# Patient Record
Sex: FEMALE | Race: WHITE | NOT HISPANIC OR LATINO | Employment: OTHER | URBAN - METROPOLITAN AREA
[De-identification: names, ages, dates, MRNs, and addresses within clinical notes are randomized per-mention and may not be internally consistent; named-entity substitution may affect disease eponyms.]

---

## 2017-01-12 ENCOUNTER — OFFICE VISIT (OUTPATIENT)
Dept: URGENT CARE | Facility: URGENT CARE | Age: 57
End: 2017-01-12
Payer: COMMERCIAL

## 2017-01-12 VITALS
BODY MASS INDEX: 33.9 KG/M2 | DIASTOLIC BLOOD PRESSURE: 76 MMHG | SYSTOLIC BLOOD PRESSURE: 118 MMHG | HEART RATE: 101 BPM | WEIGHT: 216 LBS | OXYGEN SATURATION: 96 % | TEMPERATURE: 98.5 F | HEIGHT: 67 IN

## 2017-01-12 DIAGNOSIS — N30.01 ACUTE CYSTITIS WITH HEMATURIA: Primary | ICD-10-CM

## 2017-01-12 DIAGNOSIS — R30.0 DYSURIA: ICD-10-CM

## 2017-01-12 LAB
ALBUMIN UR-MCNC: NEGATIVE MG/DL
APPEARANCE UR: ABNORMAL
BACTERIA #/AREA URNS HPF: ABNORMAL /HPF
BILIRUB UR QL STRIP: NEGATIVE
COLOR UR AUTO: YELLOW
GLUCOSE UR STRIP-MCNC: NEGATIVE MG/DL
HGB UR QL STRIP: ABNORMAL
KETONES UR STRIP-MCNC: NEGATIVE MG/DL
LEUKOCYTE ESTERASE UR QL STRIP: ABNORMAL
NITRATE UR QL: NEGATIVE
NON-SQ EPI CELLS #/AREA URNS LPF: ABNORMAL /LPF
PH UR STRIP: 7 PH (ref 5–7)
RBC #/AREA URNS AUTO: ABNORMAL /HPF (ref 0–2)
SP GR UR STRIP: 1.02 (ref 1–1.03)
URN SPEC COLLECT METH UR: ABNORMAL
UROBILINOGEN UR STRIP-ACNC: 0.2 EU/DL (ref 0.2–1)
WBC #/AREA URNS AUTO: ABNORMAL /HPF (ref 0–2)

## 2017-01-12 PROCEDURE — 81001 URINALYSIS AUTO W/SCOPE: CPT | Performed by: NURSE PRACTITIONER

## 2017-01-12 PROCEDURE — 99213 OFFICE O/P EST LOW 20 MIN: CPT | Performed by: NURSE PRACTITIONER

## 2017-01-12 PROCEDURE — 87088 URINE BACTERIA CULTURE: CPT | Performed by: NURSE PRACTITIONER

## 2017-01-12 PROCEDURE — 87186 SC STD MICRODIL/AGAR DIL: CPT | Performed by: NURSE PRACTITIONER

## 2017-01-12 PROCEDURE — 87086 URINE CULTURE/COLONY COUNT: CPT | Performed by: NURSE PRACTITIONER

## 2017-01-12 RX ORDER — NITROFURANTOIN 25; 75 MG/1; MG/1
100 CAPSULE ORAL 2 TIMES DAILY
Qty: 14 CAPSULE | Refills: 0 | Status: SHIPPED | OUTPATIENT
Start: 2017-01-12 | End: 2017-03-11

## 2017-01-12 RX ORDER — CETIRIZINE HYDROCHLORIDE, PSEUDOEPHEDRINE HYDROCHLORIDE 5; 120 MG/1; MG/1
1 TABLET, FILM COATED, EXTENDED RELEASE ORAL 2 TIMES DAILY
Qty: 180 TABLET | Refills: 3 | Status: CANCELLED | OUTPATIENT
Start: 2017-01-12

## 2017-01-12 NOTE — MR AVS SNAPSHOT
After Visit Summary   1/12/2017    Shaye Brian    MRN: [id number]           Patient Information     Date Of Birth          1960        Visit Information        Provider Department      1/12/2017 8:45 PM Brandi Martinez APRN [facility] URGENT CARE        Today's Diagnoses     Dysuria    -  1     Seasonal allergic rhinitis         Nonspecific finding on examination of urine         Acute cystitis with hematuria           Care Instructions      Urinary Tract Infections in Women  Urinary tract infections (UTIs) are most often caused by bacteria (germs). These bacteria enter the urinary tract. The bacteria may come from outside the body. Or they may travel from the skin outside the rectum or vagina into the urethra. Female anatomy makes it easier for bacteria from the bowel to enter a woman s urinary tract, which is the most common source of UTI. This means women develop UTIs more often than men. Pain in or around the urinary tract is a common UTI symptom. But the only way to know for sure if you have a UTI for the health care provider to test your urine. The two tests that may be done are the urinalysis and urine culture.  Types of UTIs    Cystitis: A bladder infection (cystitis) is the most common UTI in women. You may have urgent or frequent urination. You may also have pain, burning when you urinate, and bloody urine.    Urethritis: This is an inflamed urethra, which is the tube that carries urine from the bladder to outside the body. You may have lower stomach or back pain. You may also have urgent or frequent urination.    Pyelonephritis: This is a kidney infection. If not treated, it can be serious and damage your kidneys. In severe cases, you may be hospitalized. You may have a fever and lower back pain.  Medications to treat a UTI  Most UTIs are treated with antibiotics. These kill the bacteria. The length of time you need to take them depends on the type of infection.  It may be as short as 3 days. If you have repeated UTIs, a low-dose antibiotic may be needed for several months. Take antibiotics exactly as directed. Don t stop taking them until all of the medication is gone. If you stop taking the antibiotic too soon, the infection may not go away, and you may develop a resistance to the antibiotic. This can make it much harder to treat.  Lifestyle changes to treat and prevent UTIs  The lifestyle changes below will help get rid of your UTI. They may also help prevent future UTIs.    Drink plenty of fluids. This includes water, juice, or other caffeine-free drinks. Fluids help flush bacteria out of your body.    Empty your bladder. Always empty your bladder when you feel the urge to urinate. And always urinate before going to sleep. Urine that stays in your bladder can lead to infection. Try to urinate before and after sex as well.    Practice good personal hygiene. Wipe yourself from front to back after using the toilet. This helps keep bacteria from getting into the urethra.    Use condoms during sex. These help prevent UTIs caused by sexually transmitted bacteria. Also, avoid using spermicides during sex. These can increase the risk of UTIs. Choose other forms of birth control instead. For women who tend to get UTIs after sex, a low-dose of a preventive antibiotic may be used. Be sure to discuss this option with your health care provider.    Follow up with your health care provider as directed. He or she may test to make sure the infection has cleared. If necessary, additional treatment may be started.    7458-0079 [employer]. [address] [place] [address], [place] [address]. All rights reserved. This information is not intended as a substitute for professional medical care. Always follow your healthcare professional's instructions.              Follow-ups after your visit        Who to contact     If you have questions or need follow up information about today's clinic  "visit or your schedule please contact [facility] URGENT CARE directly at [phone].  Normal or non-critical lab and imaging results will be communicated to you by MyChart, letter or phone within 4 business days after the clinic has received the results. If you do not hear from us within 7 days, please contact the clinic through Busuuhart or phone. If you have a critical or abnormal lab result, we will notify you by phone as soon as possible.  Submit refill requests through coJuvo or call your pharmacy and they will forward the refill request to us. Please allow 3 business days for your refill to be completed.          Additional Information About Your Visit        BusuuharConnectbright Information     [employer]Juvo gives you secure access to your electronic health record. If you see a primary care provider, you can also send messages to your care team and make appointments. If you have questions, please call your primary care clinic.  If you do not have a primary care provider, please call [phone] and they will assist you.        Care EveryWhere ID     This is your Care EveryWhere ID. This could be used by other organizations to access your [place] medical records  [id number]        Your Vitals Were     Pulse Temperature Height BMI (Body Mass Index) Pulse Oximetry Last Period    101 98.5  F (36.9  C) (Oral) 5' 7\" (1.702 m) 33.82 kg/m2 96% 12/05/2013       Blood Pressure from Last 3 Encounters:   01/12/17 118/76   11/30/16 110/72   06/03/16 110/70    Weight from Last 3 Encounters:   01/12/17 216 lb (97.977 kg)   11/30/16 212 lb 9.6 oz (96.435 kg)   06/03/16 202 lb 9.6 oz (91.899 kg)              We Performed the Following     *UA reflex to Microscopic and Culture ([facility] (except [place] and [place])     Urine Culture Aerobic Bacterial     Urine Microscopic          Today's Medication Changes          These changes are accurate as of: 1/12/17  9:10 PM.  If you have any questions, ask " your nurse or doctor.               Start taking these medicines.        Dose/Directions    nitrofurantoin (macrocrystal-monohydrate) 100 MG capsule   Commonly known as:  MACROBID   Used for:  Acute cystitis with hematuria   Started by:  Brandi Martinez APRN CNP        Dose:  100 mg   Take 1 capsule (100 mg) by mouth 2 times daily   Quantity:  14 capsule   Refills:  0            Where to get your medicines      These medications were sent to [id number] - [facility] [id number] [place] [address]  [address] [facility] [address]    Hours:  24-hours Phone:  [phone]    - nitrofurantoin (macrocrystal-monohydrate) 100 MG capsule             Primary Care Provider Office Phone # Fax #    She March [phone]       [address] [address]        Thank you!     Thank you for choosing [facility]  for your care. Our goal is always to provide you with excellent care. Hearing back from our patients is one way we can continue to improve our services. Please take a few minutes to complete the written survey that you may receive in the mail after your visit with us. Thank you!             Your Updated Medication List - Protect others around you: Learn how to safely use, store and throw away your medicines at www.disposemymeds.org.          This list is accurate as of: 1/12/17  9:10 PM.  Always use your most recent med list.                   Brand Name Dispense Instructions for use    cetirizine-psuedoePHEDrine 5-120 MG per 12 hr tablet    zyrTEC-D    180 tablet    Take 1 tablet by mouth 2 times daily       cyclobenzaprine 10 MG tablet    FLEXERIL    30 tablet    Take 0.5-1 tablets (5-10 mg) by mouth 3 times daily as needed for muscle spasms       diphenhydrAMINE 25 MG tablet    BENADRYL    60 tablet    Take 1-2 tablets by mouth every 6 hours as needed for itching.       ibuprofen 200  MG capsule     120 capsule    Take 200 mg by mouth every 4 hours as needed for fever       nitrofurantoin (macrocrystal-monohydrate) 100 MG capsule    MACROBID    14 capsule    Take 1 capsule (100 mg) by mouth 2 times daily       nystatin-triamcinolone ointment    MYCOLOG    30 g    Apply topically 2 times daily

## 2017-01-13 NOTE — PROGRESS NOTES
"Chief Complaint   Patient presents with     UTI     burning, feels like she has to urinate, but does not come out x today       SUBJECTIVE:  Shaye Brian is a 56 year old female who presents with a single day of urinary frequency and I didn't see an mild dysuria. No fevers. No chills. No abdominal discomfort. Slight flank discomfort but she has chronic low back pain. No STD concerns. No unusual vaginal discharge.        OBJECTIVE:  /76 mmHg  Pulse 101  Temp(Src) 98.5  F (36.9  C) (Oral)  Ht 5' 7\" (1.702 m)  Wt 216 lb (97.977 kg)  BMI 33.82 kg/m2  SpO2 96%  LMP 12/05/2013   middle-aged female in no acute distress. Nontoxic looking. Bilateral eyes were non injected with pupils equal and reactive to light. Fundi was normal. Bilateral TM were clear. Bilateral external ear canals were clear. Oral mucosa was moist without any erythema or exudate. No significant cervical adenopathy. Lung sounds were clear to ascultation throughout without any wheezing or rales. No rhonchi. Heart was of regular rhythm and rate. No murmur. Abdomen was soft and nontender, with bowel sounds active throughout. No organomegaly. Pelvic exam was deferred.        Results for orders placed or performed in visit on 01/12/17   *UA reflex to Microscopic and Culture ([facility] (except [place] and [place])   Result Value Ref Range    Color Urine Yellow     Appearance Urine Slightly Cloudy     Glucose Urine Negative NEG mg/dL    Bilirubin Urine Negative NEG    Ketones Urine Negative NEG mg/dL    Specific Gravity Urine 1.020 1.003 - 1.035    Blood Urine Trace (A) NEG    pH Urine 7.0 5.0 - 7.0 pH    Protein Albumin Urine Negative NEG mg/dL    Urobilinogen Urine 0.2 0.2 - 1.0 EU/dL    Nitrite Urine Negative NEG    Leukocyte Esterase Urine Moderate (A) NEG    Source Midstream Urine    Urine Microscopic   Result Value Ref Range    WBC Urine 10-25 (A) 0 - 2 /HPF    RBC Urine O - 2 0 - 2 /HPF    Squamous " Epithelial /LPF Urine Few FEW /LPF    Bacteria Urine Few (A) NEG /HPF         ASSESSMENT/PLAN:      (N30.01) Acute cystitis with hematuria  Comment: Probably a case of acute cystitis treated as below. Other symptomatic management advice. Plan to follow-up with persisting concerns  Plan: nitrofurantoin, macrocrystal-monohydrate,         (MACROBID) 100 MG capsule            DARREL Taylor CNP

## 2017-01-13 NOTE — NURSING NOTE
"Chief Complaint   Patient presents with     UTI     burning, feels like she has to urinate, but does not come out x today       Initial /76 mmHg  Pulse 101  Temp(Src) 98.5  F (36.9  C) (Oral)  Ht 5' 7\" (1.702 m)  Wt 216 lb (97.977 kg)  BMI 33.82 kg/m2  SpO2 96%  LMP 12/05/2013 Estimated body mass index is 33.82 kg/(m^2) as calculated from the following:    Height as of this encounter: 5' 7\" (1.702 m).    Weight as of this encounter: 216 lb (97.977 kg).  BP completed using cuff size: eron Nur CMA      "

## 2017-01-13 NOTE — PATIENT INSTRUCTIONS
Urinary Tract Infections in Women  Urinary tract infections (UTIs) are most often caused by bacteria (germs). These bacteria enter the urinary tract. The bacteria may come from outside the body. Or they may travel from the skin outside the rectum or vagina into the urethra. Female anatomy makes it easier for bacteria from the bowel to enter a woman s urinary tract, which is the most common source of UTI. This means women develop UTIs more often than men. Pain in or around the urinary tract is a common UTI symptom. But the only way to know for sure if you have a UTI for the health care provider to test your urine. The two tests that may be done are the urinalysis and urine culture.  Types of UTIs    Cystitis: A bladder infection (cystitis) is the most common UTI in women. You may have urgent or frequent urination. You may also have pain, burning when you urinate, and bloody urine.    Urethritis: This is an inflamed urethra, which is the tube that carries urine from the bladder to outside the body. You may have lower stomach or back pain. You may also have urgent or frequent urination.    Pyelonephritis: This is a kidney infection. If not treated, it can be serious and damage your kidneys. In severe cases, you may be hospitalized. You may have a fever and lower back pain.  Medications to treat a UTI  Most UTIs are treated with antibiotics. These kill the bacteria. The length of time you need to take them depends on the type of infection. It may be as short as 3 days. If you have repeated UTIs, a low-dose antibiotic may be needed for several months. Take antibiotics exactly as directed. Don t stop taking them until all of the medication is gone. If you stop taking the antibiotic too soon, the infection may not go away, and you may develop a resistance to the antibiotic. This can make it much harder to treat.  Lifestyle changes to treat and prevent UTIs  The lifestyle changes below will help get rid of your UTI. They  may also help prevent future UTIs.    Drink plenty of fluids. This includes water, juice, or other caffeine-free drinks. Fluids help flush bacteria out of your body.    Empty your bladder. Always empty your bladder when you feel the urge to urinate. And always urinate before going to sleep. Urine that stays in your bladder can lead to infection. Try to urinate before and after sex as well.    Practice good personal hygiene. Wipe yourself from front to back after using the toilet. This helps keep bacteria from getting into the urethra.    Use condoms during sex. These help prevent UTIs caused by sexually transmitted bacteria. Also, avoid using spermicides during sex. These can increase the risk of UTIs. Choose other forms of birth control instead. For women who tend to get UTIs after sex, a low-dose of a preventive antibiotic may be used. Be sure to discuss this option with your health care provider.    Follow up with your health care provider as directed. He or she may test to make sure the infection has cleared. If necessary, additional treatment may be started.    0410-1973 [employer]. [address] [place] [address], [place] [address]. All rights reserved. This information is not intended as a substitute for professional medical care. Always follow your healthcare professional's instructions.

## 2017-01-21 LAB
BACTERIA SPEC CULT: ABNORMAL
MICRO REPORT STATUS: ABNORMAL
MICROORGANISM SPEC CULT: ABNORMAL
SPECIMEN SOURCE: ABNORMAL

## 2017-02-15 ENCOUNTER — OFFICE VISIT (OUTPATIENT)
Dept: FAMILY MEDICINE | Facility: CLINIC | Age: 57
End: 2017-02-15
Payer: COMMERCIAL

## 2017-02-15 VITALS
HEART RATE: 95 BPM | HEIGHT: 61 IN | RESPIRATION RATE: 16 BRPM | DIASTOLIC BLOOD PRESSURE: 84 MMHG | OXYGEN SATURATION: 96 % | WEIGHT: 216 LBS | SYSTOLIC BLOOD PRESSURE: 126 MMHG | BODY MASS INDEX: 40.78 KG/M2 | TEMPERATURE: 98.3 F

## 2017-02-15 DIAGNOSIS — J30.2 SEASONAL ALLERGIC RHINITIS, UNSPECIFIED ALLERGIC RHINITIS TRIGGER: ICD-10-CM

## 2017-02-15 DIAGNOSIS — R30.0 DYSURIA: Primary | ICD-10-CM

## 2017-02-15 LAB
ALBUMIN UR-MCNC: NEGATIVE MG/DL
APPEARANCE UR: CLEAR
BACTERIA #/AREA URNS HPF: ABNORMAL /HPF
BILIRUB UR QL STRIP: NEGATIVE
COLOR UR AUTO: YELLOW
GLUCOSE UR STRIP-MCNC: NEGATIVE MG/DL
HGB UR QL STRIP: ABNORMAL
KETONES UR STRIP-MCNC: NEGATIVE MG/DL
LEUKOCYTE ESTERASE UR QL STRIP: ABNORMAL
NITRATE UR QL: NEGATIVE
NON-SQ EPI CELLS #/AREA URNS LPF: ABNORMAL /LPF
PH UR STRIP: 6.5 PH (ref 5–7)
RBC #/AREA URNS AUTO: ABNORMAL /HPF (ref 0–2)
SP GR UR STRIP: 1.01 (ref 1–1.03)
URN SPEC COLLECT METH UR: ABNORMAL
UROBILINOGEN UR STRIP-ACNC: 0.2 EU/DL (ref 0.2–1)
WBC #/AREA URNS AUTO: ABNORMAL /HPF (ref 0–2)

## 2017-02-15 PROCEDURE — 81001 URINALYSIS AUTO W/SCOPE: CPT | Performed by: FAMILY MEDICINE

## 2017-02-15 PROCEDURE — 99213 OFFICE O/P EST LOW 20 MIN: CPT | Performed by: FAMILY MEDICINE

## 2017-02-15 RX ORDER — CETIRIZINE HYDROCHLORIDE, PSEUDOEPHEDRINE HYDROCHLORIDE 5; 120 MG/1; MG/1
1 TABLET, FILM COATED, EXTENDED RELEASE ORAL 2 TIMES DAILY
Qty: 180 TABLET | Refills: 3 | Status: SHIPPED | OUTPATIENT
Start: 2017-02-15 | End: 2017-12-01

## 2017-02-15 RX ORDER — NITROFURANTOIN 25; 75 MG/1; MG/1
100 CAPSULE ORAL 2 TIMES DAILY
Qty: 14 CAPSULE | Refills: 0 | Status: SHIPPED | OUTPATIENT
Start: 2017-02-15 | End: 2017-03-11

## 2017-02-15 NOTE — NURSING NOTE
"Chief Complaint   Patient presents with     UTI     Sinus Problem       Initial /84 (BP Location: Right arm, Cuff Size: Adult Regular)  Pulse 95  Temp 98.3  F (36.8  C) (Oral)  Resp 16  Ht 5' 1\" (1.549 m)  Wt 216 lb (98 kg)  LMP 12/05/2013  SpO2 96%  BMI 40.81 kg/m2 Estimated body mass index is 40.81 kg/(m^2) as calculated from the following:    Height as of this encounter: 5' 1\" (1.549 m).    Weight as of this encounter: 216 lb (98 kg).  Medication Reconciliation: complete     Neelima Michelle CMA      "

## 2017-02-15 NOTE — PROGRESS NOTES
SUBJECTIVE:                                                    Shaye Brian is a 56 year old female who presents to clinic today for the following health issues:      URINARY TRACT SYMPTOMS      Duration: Yesterday    Description  Burning and urgency    Intensity:  moderate    Accompanying signs and symptoms:  Fever/chills: no   Flank pain no   Nausea and vomiting: no   Vaginal symptoms: none  Abdominal/Pelvic Pain: no     History  History of frequent UTI's: YES  History of kidney stones: no   Sexually Active: no   Possibility of pregnancy: No    Precipitating or alleviating factors: None    Therapies tried and outcome: cranberry juice       OBJECTIVE: Appears well, in no apparent distress.  Vital signs are normal. The abdomen is soft without tenderness, guarding, mass, rebound or organomegaly. No CVA tenderness or inguinal adenopathy noted.       Results for orders placed or performed in visit on 02/15/17   *UA reflex to Microscopic and Culture ([facility] (except [place] and [place])   Result Value Ref Range    Color Urine Yellow     Appearance Urine Clear     Glucose Urine Negative NEG mg/dL    Bilirubin Urine Negative NEG    Ketones Urine Negative NEG mg/dL    Specific Gravity Urine 1.015 1.003 - 1.035    Blood Urine Trace (A) NEG    pH Urine 6.5 5.0 - 7.0 pH    Protein Albumin Urine Negative NEG mg/dL    Urobilinogen Urine 0.2 0.2 - 1.0 EU/dL    Nitrite Urine Negative NEG    Leukocyte Esterase Urine Trace (A) NEG    Source Midstream Urine    Urine Microscopic   Result Value Ref Range    WBC Urine 2-5 (A) 0 - 2 /HPF    RBC Urine O - 2 0 - 2 /HPF    Squamous Epithelial /LPF Urine Few FEW /LPF    Bacteria Urine Few (A) NEG /HPF     ENT; inferior turbs enlarged   ASSESSMENT: UTI uncomplicated without evidence of pyelonephritis    Allergic rhinitis    PLAN: Treatment per orders - also push fluids, may use Pyridium OTC prn. Call or return to clinic prn if these symptoms worsen or  fail to improve as anticipated.

## 2017-02-15 NOTE — MR AVS SNAPSHOT
"              After Visit Summary   2/15/2017    Shaye Brian    MRN: [id number]           Patient Information     Date Of Birth          1960        Visit Information        Provider Department      2/15/2017 3:15 PM Gurdeep Huber MD [facility]        Today's Diagnoses     Dysuria    -  1    Seasonal allergic rhinitis, unspecified allergic rhinitis trigger           Follow-ups after your visit        Who to contact     If you have questions or need follow up information about today's clinic visit or your schedule please contact [facility] directly at [phone].  Normal or non-critical lab and imaging results will be communicated to you by Olocityhart, letter or phone within 4 business days after the clinic has received the results. If you do not hear from us within 7 days, please contact the clinic through Olocityhart or phone. If you have a critical or abnormal lab result, we will notify you by phone as soon as possible.  Submit refill requests through [employer]assoft or call your pharmacy and they will forward the refill request to us. Please allow 3 business days for your refill to be completed.          Additional Information About Your Visit        MyChart Information     [employer] gives you secure access to your electronic health record. If you see a primary care provider, you can also send messages to your care team and make appointments. If you have questions, please call your primary care clinic.  If you do not have a primary care provider, please call [phone] and they will assist you.        Care EveryWhere ID     This is your Care EveryWhere ID. This could be used by other organizations to access your [place] medical records  [id number]        Your Vitals Were     Pulse Temperature Respirations Height Last Period Pulse Oximetry    95 98.3  F (36.8  C) (Oral) 16 5' 1\" (1.549 m) 12/05/2013 96%    BMI (Body Mass Index)                   40.81 kg/m2            Blood " Pressure from Last 3 Encounters:   02/15/17 126/84   01/12/17 118/76   11/30/16 110/72    Weight from Last 3 Encounters:   02/15/17 216 lb (98 kg)   01/12/17 216 lb (98 kg)   11/30/16 212 lb 9.6 oz (96.4 kg)              We Performed the Following     *UA reflex to Microscopic and Culture ([facility] [place] [facility] (except [place] and [place])     Urine Microscopic          Today's Medication Changes          These changes are accurate as of: 2/15/17 11:59 PM.  If you have any questions, ask your nurse or doctor.               These medicines have changed or have updated prescriptions.        Dose/Directions    * nitrofurantoin (macrocrystal-monohydrate) 100 MG capsule   Commonly known as:  MACROBID   This may have changed:  Another medication with the same name was added. Make sure you understand how and when to take each.   Used for:  Acute cystitis with hematuria   Changed by:  Brandi Martinez APRN CNP        Dose:  100 mg   Take 1 capsule (100 mg) by mouth 2 times daily   Quantity:  14 capsule   Refills:  0       * nitrofurantoin (macrocrystal-monohydrate) 100 MG capsule   Commonly known as:  MACROBID   This may have changed:  You were already taking a medication with the same name, and this prescription was added. Make sure you understand how and when to take each.   Used for:  Dysuria, Seasonal allergic rhinitis, unspecified allergic rhinitis trigger   Changed by:  Gurdeep Huber MD        Dose:  100 mg   Take 1 capsule (100 mg) by mouth 2 times daily   Quantity:  14 capsule   Refills:  0       * Notice:  This list has 2 medication(s) that are the same as other medications prescribed for you. Read the directions carefully, and ask your doctor or other care provider to review them with you.         Where to get your medicines      These medications were sent to [facility] [address] [place], [place] - [address] [facility]  [address] [facility] [address]     Phone:  [phone]      nitrofurantoin (macrocrystal-monohydrate) 100 MG capsule         Some of these will need a paper prescription and others can be bought over the counter.  Ask your nurse if you have questions.     Bring a paper prescription for each of these medications     cetirizine-psuedoePHEDrine 5-120 MG per 12 hr tablet                Primary Care Provider Office Phone # Fax #    She MarchELICIA [phone]       [facility] [address] [address]  [facility] [address]        Thank you!     Thank you for choosing [facility]  for your care. Our goal is always to provide you with excellent care. Hearing back from our patients is one way we can continue to improve our services. Please take a few minutes to complete the written survey that you may receive in the mail after your visit with us. Thank you!             Your Updated Medication List - Protect others around you: Learn how to safely use, store and throw away your medicines at www.disposemymeds.org.          This list is accurate as of: 2/15/17 11:59 PM.  Always use your most recent med list.                   Brand Name Dispense Instructions for use    cetirizine-psuedoePHEDrine 5-120 MG per 12 hr tablet    zyrTEC-D    180 tablet    Take 1 tablet by mouth 2 times daily       cyclobenzaprine 10 MG tablet    FLEXERIL    30 tablet    Take 0.5-1 tablets (5-10 mg) by mouth 3 times daily as needed for muscle spasms       diphenhydrAMINE 25 MG tablet    BENADRYL    60 tablet    Take 1-2 tablets by mouth every 6 hours as needed for itching.       ibuprofen 200 MG capsule     120 capsule    Take 200 mg by mouth every 4 hours as needed for fever       * nitrofurantoin (macrocrystal-monohydrate) 100 MG capsule    MACROBID    14 capsule    Take 1 capsule (100 mg) by mouth 2 times daily       * nitrofurantoin (macrocrystal-monohydrate) 100 MG capsule    MACROBID    14 capsule    Take 1 capsule (100 mg) by mouth 2 times daily        nystatin-triamcinolone ointment    MYCOLOG    30 g    Apply topically 2 times daily       * Notice:  This list has 2 medication(s) that are the same as other medications prescribed for you. Read the directions carefully, and ask your doctor or other care provider to review them with you.

## 2017-03-11 ENCOUNTER — OFFICE VISIT (OUTPATIENT)
Dept: URGENT CARE | Facility: URGENT CARE | Age: 57
End: 2017-03-11
Payer: COMMERCIAL

## 2017-03-11 VITALS
SYSTOLIC BLOOD PRESSURE: 110 MMHG | HEIGHT: 67 IN | HEART RATE: 76 BPM | DIASTOLIC BLOOD PRESSURE: 70 MMHG | BODY MASS INDEX: 33.9 KG/M2 | OXYGEN SATURATION: 98 % | WEIGHT: 216 LBS | TEMPERATURE: 98.2 F | RESPIRATION RATE: 16 BRPM

## 2017-03-11 DIAGNOSIS — N30.00 ACUTE CYSTITIS WITHOUT HEMATURIA: ICD-10-CM

## 2017-03-11 DIAGNOSIS — R82.90 NONSPECIFIC FINDING ON EXAMINATION OF URINE: ICD-10-CM

## 2017-03-11 DIAGNOSIS — R30.0 DYSURIA: Primary | ICD-10-CM

## 2017-03-11 LAB
ALBUMIN UR-MCNC: NEGATIVE MG/DL
APPEARANCE UR: ABNORMAL
BACTERIA #/AREA URNS HPF: ABNORMAL /HPF
BILIRUB UR QL STRIP: NEGATIVE
COLOR UR AUTO: YELLOW
GLUCOSE UR STRIP-MCNC: NEGATIVE MG/DL
HGB UR QL STRIP: ABNORMAL
KETONES UR STRIP-MCNC: NEGATIVE MG/DL
LEUKOCYTE ESTERASE UR QL STRIP: ABNORMAL
NITRATE UR QL: POSITIVE
PH UR STRIP: 7 PH (ref 5–7)
RBC #/AREA URNS AUTO: ABNORMAL /HPF (ref 0–2)
SP GR UR STRIP: 1.02 (ref 1–1.03)
URN SPEC COLLECT METH UR: ABNORMAL
UROBILINOGEN UR STRIP-ACNC: 0.2 EU/DL (ref 0.2–1)
WBC #/AREA URNS AUTO: >100 /HPF (ref 0–2)

## 2017-03-11 PROCEDURE — 87186 SC STD MICRODIL/AGAR DIL: CPT | Performed by: NURSE PRACTITIONER

## 2017-03-11 PROCEDURE — 87088 URINE BACTERIA CULTURE: CPT | Performed by: NURSE PRACTITIONER

## 2017-03-11 PROCEDURE — 81001 URINALYSIS AUTO W/SCOPE: CPT | Performed by: FAMILY MEDICINE

## 2017-03-11 PROCEDURE — 99213 OFFICE O/P EST LOW 20 MIN: CPT | Performed by: NURSE PRACTITIONER

## 2017-03-11 PROCEDURE — 87086 URINE CULTURE/COLONY COUNT: CPT | Performed by: NURSE PRACTITIONER

## 2017-03-11 RX ORDER — CIPROFLOXACIN 500 MG/1
500 TABLET, FILM COATED ORAL 2 TIMES DAILY
Qty: 14 TABLET | Refills: 0 | Status: SHIPPED | OUTPATIENT
Start: 2017-03-11 | End: 2017-03-18

## 2017-03-11 RX ORDER — CIPROFLOXACIN 500 MG/1
500 TABLET, FILM COATED ORAL 2 TIMES DAILY
Qty: 14 TABLET | Refills: 0 | Status: CANCELLED | OUTPATIENT
Start: 2017-03-11

## 2017-03-11 NOTE — MR AVS SNAPSHOT
"              After Visit Summary   3/11/2017    Shaye Brian    MRN: [id number]           Patient Information     Date Of Birth          1960        Visit Information        Provider Department      3/11/2017 4:30 PM Jesu Gonzalez NP [facility]        Today's Diagnoses     Dysuria    -  1    Nonspecific finding on examination of urine          Care Instructions       * BLADDER INFECTION,Female (Adult)    A bladder infection (\"cystitis\" or \"UTI\") usually causes a constant urge to urinate and a burning when passing urine. Urine may be cloudy, smelly or dark. There may be pain in the lower abdomen. A bladder infection occurs when bacteria from the vaginal area enter the bladder opening (urethra). This can occur from sexual intercourse, wearing tight clothing, dehydration and other factors.  HOME CARE:  1. Drink lots of fluids (at least 6-8 glasses a day, unless you must restrict fluids for other medical reasons). This will force the medicine into your urinary system and flush the bacteria out of your body. Cranberry juice has been shown to help clear out the bacteria.  2. Avoid sexual intercourse until your symptoms are gone.  3. A bladder infection is treated with antibiotics. You may also be given Pyridium (generic = phenazopyridine) to reduce the burning sensation. This medicine will cause your urine to become a bright orange color. The orange urine may stain clothing. You may wear a pad or panty-liner to protect clothing.  PREVENTING FUTURE INFECTIONS:  1. Always wipe from front to back after a bowel movement.  2. Keep the genital area clean and dry.  3. Drink plenty of fluids each day to avoid dehydration.  4. Urinate right after intercourse to flush out the bladder.  5. Wear cotton underwear and cotton-lined panty hose; avoid tight-fitting pants.  6. If you are on birth control pills and are having frequent bladder infections, discuss with your doctor.  FOLLOW UP: Return to this " facility or see your doctor if ALL symptoms are not gone after three days of treatment.  GET PROMPT MEDICAL ATTENTION if any of the following occur:    Fever over 101 F (38.3 C)    No improvement by the third day of treatment    Increasing back or abdominal pain    Repeated vomiting; unable to keep medicine down    Weakness, dizziness or fainting    Vaginal discharge    Pain, redness or swelling in the labia (outer vaginal area)    6591-5072 [employer], [address] [place] [address], [place] [address]. All rights reserved. This information is not intended as a substitute for professional medical care. Always follow your healthcare professional's instructions.          Follow-ups after your visit        Who to contact     If you have questions or need follow up information about today's clinic visit or your schedule please contact [facility] directly at [phone].  Normal or non-critical lab and imaging results will be communicated to you by [employer], letter or phone within 4 business days after the clinic has received the results. If you do not hear from us within 7 days, please contact the clinic through [employer] or phone. If you have a critical or abnormal lab result, we will notify you by phone as soon as possible.  Submit refill requests through [employer] or call your pharmacy and they will forward the refill request to us. Please allow 3 business days for your refill to be completed.          Additional Information About Your Visit        [employer] Information     [employer] gives you secure access to your electronic health record. If you see a primary care provider, you can also send messages to your care team and make appointments. If you have questions, please call your primary care clinic.  If you do not have a primary care provider, please call [phone] and they will assist you.        Care EveryWhere ID     This is your Care EveryWhere ID. This could be used by other organizations to  "access your [place] medical records  [id number]        Your Vitals Were     Pulse Temperature Respirations Height Last Period Pulse Oximetry    76 98.2  F (36.8  C) (Oral) 16 5' 7\" (1.702 m) 12/05/2013 98%    BMI (Body Mass Index)                   33.83 kg/m2            Blood Pressure from Last 3 Encounters:   03/11/17 110/70   02/15/17 126/84   01/12/17 118/76    Weight from Last 3 Encounters:   03/11/17 216 lb (98 kg)   02/15/17 216 lb (98 kg)   01/12/17 216 lb (98 kg)              We Performed the Following     UA reflex to Microscopic and Culture     Urine Culture Aerobic Bacterial     Urine Microscopic        Primary Care Provider Office Phone # Fax #    She March [phone]       [facility] [address] [address] [facility] [address]        Thank you!     Thank you for choosing [facility]  for your care. Our goal is always to provide you with excellent care. Hearing back from our patients is one way we can continue to improve our services. Please take a few minutes to complete the written survey that you may receive in the mail after your visit with us. Thank you!             Your Updated Medication List - Protect others around you: Learn how to safely use, store and throw away your medicines at www.disposemymeds.org.          This list is accurate as of: 3/11/17  4:51 PM.  Always use your most recent med list.                   Brand Name Dispense Instructions for use    cetirizine-psuedoePHEDrine 5-120 MG per 12 hr tablet    zyrTEC-D    180 tablet    Take 1 tablet by mouth 2 times daily       cyclobenzaprine 10 MG tablet    FLEXERIL    30 tablet    Take 0.5-1 tablets (5-10 mg) by mouth 3 times daily as needed for muscle spasms       diphenhydrAMINE 25 MG tablet    BENADRYL    60 tablet    Take 1-2 tablets by mouth every 6 hours as needed for itching.       ibuprofen 200 MG capsule     120 capsule    Take 200 mg by mouth every 4 hours as needed for fever    "    nystatin-triamcinolone ointment    MYCOLOG    30 g    Apply topically 2 times daily

## 2017-03-11 NOTE — NURSING NOTE
"Shaye Brian is a 56 year old female.      Chief Complaint   Patient presents with     Urgent Care     Urinary Problem     sx started again today of dysuria - has a hx of getting UTI'd over the past few wks       Initial /70 (BP Location: Right arm, Patient Position: Chair, Cuff Size: Adult Large)  Pulse 76  Temp 98.2  F (36.8  C) (Oral)  Resp 16  Ht 5' 7\" (1.702 m)  Wt 216 lb (98 kg)  LMP 12/05/2013  SpO2 98%  BMI 33.83 kg/m2 Estimated body mass index is 33.83 kg/(m^2) as calculated from the following:    Height as of this encounter: 5' 7\" (1.702 m).    Weight as of this encounter: 216 lb (98 kg).  Medication Reconciliation: complete      Questioned patient about current smoking habits.  Pt. has never smoked.      Joaquina Vizcaino CMA      "

## 2017-03-11 NOTE — PATIENT INSTRUCTIONS
"   * BLADDER INFECTION,Female (Adult)    A bladder infection (\"cystitis\" or \"UTI\") usually causes a constant urge to urinate and a burning when passing urine. Urine may be cloudy, smelly or dark. There may be pain in the lower abdomen. A bladder infection occurs when bacteria from the vaginal area enter the bladder opening (urethra). This can occur from sexual intercourse, wearing tight clothing, dehydration and other factors.  HOME CARE:  1. Drink lots of fluids (at least 6-8 glasses a day, unless you must restrict fluids for other medical reasons). This will force the medicine into your urinary system and flush the bacteria out of your body. Cranberry juice has been shown to help clear out the bacteria.  2. Avoid sexual intercourse until your symptoms are gone.  3. A bladder infection is treated with antibiotics. You may also be given Pyridium (generic = phenazopyridine) to reduce the burning sensation. This medicine will cause your urine to become a bright orange color. The orange urine may stain clothing. You may wear a pad or panty-liner to protect clothing.  PREVENTING FUTURE INFECTIONS:  1. Always wipe from front to back after a bowel movement.  2. Keep the genital area clean and dry.  3. Drink plenty of fluids each day to avoid dehydration.  4. Urinate right after intercourse to flush out the bladder.  5. Wear cotton underwear and cotton-lined panty hose; avoid tight-fitting pants.  6. If you are on birth control pills and are having frequent bladder infections, discuss with your doctor.  FOLLOW UP: Return to this facility or see your doctor if ALL symptoms are not gone after three days of treatment.  GET PROMPT MEDICAL ATTENTION if any of the following occur:    Fever over 101 F (38.3 C)    No improvement by the third day of treatment    Increasing back or abdominal pain    Repeated vomiting; unable to keep medicine down    Weakness, dizziness or fainting    Vaginal discharge    Pain, redness or swelling in " the labia (outer vaginal area)    4165-6203 [employer], [address] [place] [address], [place] [address]. All rights reserved. This information is not intended as a substitute for professional medical care. Always follow your healthcare professional's instructions.

## 2017-03-11 NOTE — PROGRESS NOTES
"  SUBJECTIVE:                                                    Shaye Brian is a 56 year old female who presents to clinic today for the following health issues:    URINARY TRACT SYMPTOMS      Duration: One day    Description  Dysuria, frequency and urgency    Intensity:  moderate    Accompanying signs and symptoms:  Fever/chills: no   Flank pain no   Nausea and vomiting: No   Vaginal symptoms: None  Abdominal/Pelvic Pain: No     History  History of frequent UTI's: YES- Last UTI diagnosed on 2/15/17.  Prior to that diagnosed with UTI on 1/12/17. Both of these UTI's treated with Macrobid.  History of kidney stones: No   Sexually Active: YES - No new partners  Possibility of pregnancy: No    Precipitating or alleviating factors: None    Therapies tried and outcome:  None. Takes a cranberry supplement daily in an effort to prevent UTI's.       Problem list and histories reviewed & adjusted, as indicated.  Additional history: as documented    Problem list, Medication list, Allergies, and Medical/Social/Surgical histories reviewed in EPIC and updated as appropriate.    ROS:  Constitutional, HEENT, cardiovascular, pulmonary, gi and gu systems are negative, except as otherwise noted.    OBJECTIVE:                                                    /70 (BP Location: Right arm, Patient Position: Chair, Cuff Size: Adult Large)  Pulse 76  Temp 98.2  F (36.8  C) (Oral)  Resp 16  Ht 5' 7\" (1.702 m)  Wt 216 lb (98 kg)  LMP 12/05/2013  SpO2 98%  BMI 33.83 kg/m2  Body mass index is 33.83 kg/(m^2).      GENERAL: Appears healthy, alert/responsive and in no distress    Diagnostic Test Results:  Results for orders placed or performed in visit on 03/11/17 (from the past 24 hour(s))   UA reflex to Microscopic and Culture   Result Value Ref Range    Color Urine Yellow     Appearance Urine Slightly Cloudy     Glucose Urine Negative NEG mg/dL    Bilirubin Urine Negative NEG    Ketones Urine Negative NEG mg/dL    Specific Gravity " Urine 1.020 1.003 - 1.035    Blood Urine Moderate (A) NEG    pH Urine 7.0 5.0 - 7.0 pH    Protein Albumin Urine Negative NEG mg/dL    Urobilinogen Urine 0.2 0.2 - 1.0 EU/dL    Nitrite Urine Positive (A) NEG    Leukocyte Esterase Urine Moderate (A) NEG    Source Midstream Urine    Urine Microscopic   Result Value Ref Range    WBC Urine >100 (A) 0 - 2 /HPF    RBC Urine  (A) 0 - 2 /HPF    Bacteria Urine Many (A) NEG /HPF        ASSESSMENT:                                                    1. Acute cystitis without hematuria    PLAN:                                                    1. Dysuria  - UA reflex to Microscopic and Culture  - Urine Culture Aerobic Bacterial  - Urine Microscopic    2. Nonspecific finding on examination of urine  - Urine Culture Aerobic Bacterial    3. Acute cystitis without hematuria  - Ciprofloxacin (CIPRO) 500 MG tablet; Take 1 tablet (500 mg) by mouth 2 times daily for 7 days  Dispense: 14 tablet; Refill: 0  - Review patient education/instructions and implement home management measures.  Patient instructions printed and given to the patient.   - Follow up with a primary care clinician for repeat UA following this course of antibiotic therapy and consultation regarding sudden increased incidence of UTI's.       JULIENNE Jaime  [facility]

## 2017-03-20 ENCOUNTER — OFFICE VISIT (OUTPATIENT)
Dept: FAMILY MEDICINE | Facility: CLINIC | Age: 57
End: 2017-03-20
Payer: COMMERCIAL

## 2017-03-20 VITALS
HEIGHT: 67 IN | OXYGEN SATURATION: 97 % | SYSTOLIC BLOOD PRESSURE: 116 MMHG | HEART RATE: 76 BPM | DIASTOLIC BLOOD PRESSURE: 74 MMHG | TEMPERATURE: 98.7 F | RESPIRATION RATE: 15 BRPM | BODY MASS INDEX: 34.06 KG/M2 | WEIGHT: 217 LBS

## 2017-03-20 DIAGNOSIS — R74.8 ELEVATED LIVER ENZYMES: ICD-10-CM

## 2017-03-20 DIAGNOSIS — R30.0 DYSURIA: Primary | ICD-10-CM

## 2017-03-20 LAB
ALBUMIN UR-MCNC: NEGATIVE MG/DL
APPEARANCE UR: CLEAR
BACTERIA #/AREA URNS HPF: ABNORMAL /HPF
BILIRUB UR QL STRIP: NEGATIVE
COLOR UR AUTO: YELLOW
GLUCOSE UR STRIP-MCNC: NEGATIVE MG/DL
HBA1C MFR BLD: 6 % (ref 4.3–6)
HGB UR QL STRIP: NEGATIVE
KETONES UR STRIP-MCNC: NEGATIVE MG/DL
LEUKOCYTE ESTERASE UR QL STRIP: ABNORMAL
MUCOUS THREADS #/AREA URNS LPF: PRESENT /LPF
NITRATE UR QL: NEGATIVE
NON-SQ EPI CELLS #/AREA URNS LPF: ABNORMAL /LPF
PH UR STRIP: 5.5 PH (ref 5–7)
RBC #/AREA URNS AUTO: ABNORMAL /HPF (ref 0–2)
SP GR UR STRIP: 1.02 (ref 1–1.03)
URN SPEC COLLECT METH UR: ABNORMAL
UROBILINOGEN UR STRIP-ACNC: 0.2 EU/DL (ref 0.2–1)
WBC #/AREA URNS AUTO: ABNORMAL /HPF (ref 0–2)

## 2017-03-20 PROCEDURE — 83036 HEMOGLOBIN GLYCOSYLATED A1C: CPT | Performed by: NURSE PRACTITIONER

## 2017-03-20 PROCEDURE — 99213 OFFICE O/P EST LOW 20 MIN: CPT | Performed by: NURSE PRACTITIONER

## 2017-03-20 PROCEDURE — 80053 COMPREHEN METABOLIC PANEL: CPT | Performed by: NURSE PRACTITIONER

## 2017-03-20 PROCEDURE — 36415 COLL VENOUS BLD VENIPUNCTURE: CPT | Performed by: NURSE PRACTITIONER

## 2017-03-20 PROCEDURE — 81001 URINALYSIS AUTO W/SCOPE: CPT | Performed by: NURSE PRACTITIONER

## 2017-03-20 NOTE — NURSING NOTE
"Chief Complaint   Patient presents with     UTI       Initial /74 (BP Location: Right arm, Patient Position: Chair, Cuff Size: Adult Regular)  Pulse 76  Temp 98.7  F (37.1  C) (Oral)  Resp 15  Ht 5' 7\" (1.702 m)  Wt 217 lb (98.4 kg)  LMP 12/05/2013  SpO2 97%  Breastfeeding? No  BMI 33.99 kg/m2 Estimated body mass index is 33.99 kg/(m^2) as calculated from the following:    Height as of this encounter: 5' 7\" (1.702 m).    Weight as of this encounter: 217 lb (98.4 kg).  Medication Reconciliation: complete     Salbador Nur CMA      "

## 2017-03-20 NOTE — PROGRESS NOTES
SUBJECTIVE:                                                    Shaye Brian is a 56 year old female who presents to clinic today for the following health issues:      URINARY TRACT SYMPTOMS-f/u, was told to see a primary DR      Duration: x 7 weeks, 3rd infection, ongoing, was told to see a PCP    Description  none    Intensity:  none    Accompanying signs and symptoms:  Fever/chills: no   Flank pain no   Nausea and vomiting: no   Vaginal symptoms: none  Abdominal/Pelvic Pain: no     History  History of frequent UTI's: YES  History of kidney stones: no   Sexually Active: YES  Possibility of pregnancy: No    Precipitating or alleviating factors: None    Therapies tried and outcome: see med list     Recently treated for UTI and has completed ciprofloxacin for seven days. Remains slightly symptomatic and has had several UTI 's in the past two months. Drinking plenty of fluids. Denies back pain or nausea or fever. Concerned about reason for recurrent infection. Sexually active with the same male partner.       Problem list and histories reviewed & adjusted, as indicated.  Additional history: none    Patient Active Problem List   Diagnosis     Hyperlipidemia LDL goal <160     Insomnia     Snoring     Menorrhagia     Anemia     Uterine fibroid     Thickened endometrium     S/P hysterectomy     Past Surgical History   Procedure Laterality Date     D & c  1984     after miscarriage-first time     Davinci hysterectomy total, bilateral salpingo-oophorectomy, combined  12/27/2013     Procedure: COMBINED DAVINCI HYSTERECTOMY TOTAL, SALPINGO-OOPHORECTOMY;  Combined Davinci Total Laparoscopic HYSTERECTOMY, Bilateral Salpingectomy, Cystoscopy;  Surgeon: Karolina Eduardo DO;  Location:  OR       Social History   Substance Use Topics     Smoking status: Never Smoker     Smokeless tobacco: Never Used     Alcohol use 0.0 oz/week     0 Standard drinks or equivalent per week      Comment: socially -once every one month       "Family History   Problem Relation Age of Onset     Alzheimer Disease Mother      macular degeneration     CANCER Mother      brain tumor     Hypertension Mother      HEART DISEASE Father      CANCER Father      ureter cancer     Respiratory Father      emphysema, smoker     Breast Cancer Other      maternal aunt-using hormones     CANCER Other      dad's sister with ovarian cancer, her daughter had ovarian cancer     Cancer - colorectal No family hx of            Reviewed and updated as needed this visit by clinical staff       Reviewed and updated as needed this visit by Provider         ROS:  Constitutional, HEENT, cardiovascular, pulmonary, gi and gu systems are negative, except as otherwise noted.    OBJECTIVE:                                                    /74 (BP Location: Right arm, Patient Position: Chair, Cuff Size: Adult Regular)  Pulse 76  Temp 98.7  F (37.1  C) (Oral)  Resp 15  Ht 5' 7\" (1.702 m)  Wt 217 lb (98.4 kg)  LMP 12/05/2013  SpO2 97%  Breastfeeding? No  BMI 33.99 kg/m2  Body mass index is 33.99 kg/(m^2).  GENERAL: healthy, alert and no distress  BACK: no CVA tenderness, no paralumbar tenderness  PSYCH: mentation appears normal, affect normal/bright    Results for orders placed or performed in visit on 03/20/17 (from the past 24 hour(s))   *UA reflex to Microscopic and Culture ([facility] (except [place] and [place])   Result Value Ref Range    Color Urine Yellow     Appearance Urine Clear     Glucose Urine Negative NEG mg/dL    Bilirubin Urine Negative NEG    Ketones Urine Negative NEG mg/dL    Specific Gravity Urine 1.020 1.003 - 1.035    Blood Urine Negative NEG    pH Urine 5.5 5.0 - 7.0 pH    Protein Albumin Urine Negative NEG mg/dL    Urobilinogen Urine 0.2 0.2 - 1.0 EU/dL    Nitrite Urine Negative NEG    Leukocyte Esterase Urine Small (A) NEG    Source Midstream Urine    Urine Microscopic   Result Value Ref Range    WBC Urine 2-5 (A) 0 - 2 /HPF    " RBC Urine O - 2 0 - 2 /HPF    Squamous Epithelial /LPF Urine Few FEW /LPF    Bacteria Urine Few (A) NEG /HPF    Mucous Urine Present (A) NEG /LPF   Hemoglobin A1c   Result Value Ref Range    Hemoglobin A1C 6.0 4.3 - 6.0 %        ASSESSMENT/PLAN:                                                            1. Dysuria  Urine shows trace leukocytes and has improved since being treated with ciprofloxacin 500 mg BID for seven days. Has some issues with urinary incontinence and incomplete emptying. Encouraged to drink plenty of water, allow for complete emptying and call if symptoms return. May need referral to urology if symptoms recur. Will check A1c to see if diabetic.   - *UA reflex to Microscopic and Culture ([facility] (except [place] and [place])  - Urine Microscopic  - Hemoglobin A1c    2. Elevated liver enzymes  Will check CMP since history of elevated liver enzymes.   - Comprehensive metabolic panel    Recommended a complete physical in the near future. Advised to call if symptoms return. May need a slightly longer course of ciprofloxacin.     She March, NP  [facility]

## 2017-03-20 NOTE — MR AVS SNAPSHOT
"              After Visit Summary   3/20/2017    Shaye Brian    MRN: [id number]           Patient Information     Date Of Birth          1960        Visit Information        Provider Department      3/20/2017 3:00 PM She March NP [facility]        Today's Diagnoses     Dysuria    -  1    Elevated liver enzymes           Follow-ups after your visit        Who to contact     If you have questions or need follow up information about today's clinic visit or your schedule please contact [facility] directly at [phone].  Normal or non-critical lab and imaging results will be communicated to you by [employer], letter or phone within 4 business days after the clinic has received the results. If you do not hear from us within 7 days, please contact the clinic through [employer] or phone. If you have a critical or abnormal lab result, we will notify you by phone as soon as possible.  Submit refill requests through [employer] or call your pharmacy and they will forward the refill request to us. Please allow 3 business days for your refill to be completed.          Additional Information About Your Visit        MyChart Information     [employer] gives you secure access to your electronic health record. If you see a primary care provider, you can also send messages to your care team and make appointments. If you have questions, please call your primary care clinic.  If you do not have a primary care provider, please call [phone] and they will assist you.        Care EveryWhere ID     This is your Care EveryWhere ID. This could be used by other organizations to access your [place] medical records  [id number]        Your Vitals Were     Pulse Temperature Respirations Height Last Period Pulse Oximetry    76 98.7  F (37.1  C) (Oral) 15 5' 7\" (1.702 m) 12/05/2013 97%    Breastfeeding? BMI (Body Mass Index)                No 33.99 kg/m2           Blood Pressure from Last 3 Encounters: "   03/20/17 116/74   03/11/17 110/70   02/15/17 126/84    Weight from Last 3 Encounters:   03/20/17 217 lb (98.4 kg)   03/11/17 216 lb (98 kg)   02/15/17 216 lb (98 kg)              We Performed the Following     *UA reflex to Microscopic and Culture ([facility] [place] [facility] (except [place] and [place])     Comprehensive metabolic panel     Hemoglobin A1c     Urine Microscopic        Primary Care Provider Office Phone # Fax #    Shecassandra March [phone]       [facility] [address] [address] [address] [address]        Thank you!     Thank you for choosing [facility]  for your care. Our goal is always to provide you with excellent care. Hearing back from our patients is one way we can continue to improve our services. Please take a few minutes to complete the written survey that you may receive in the mail after your visit with us. Thank you!             Your Updated Medication List - Protect others around you: Learn how to safely use, store and throw away your medicines at www.disposemymeds.org.          This list is accurate as of: 3/20/17  3:26 PM.  Always use your most recent med list.                   Brand Name Dispense Instructions for use    cetirizine-psuedoePHEDrine 5-120 MG per 12 hr tablet    zyrTEC-D    180 tablet    Take 1 tablet by mouth 2 times daily       cyclobenzaprine 10 MG tablet    FLEXERIL    30 tablet    Take 0.5-1 tablets (5-10 mg) by mouth 3 times daily as needed for muscle spasms       diphenhydrAMINE 25 MG tablet    BENADRYL    60 tablet    Take 1-2 tablets by mouth every 6 hours as needed for itching.       ibuprofen 200 MG capsule     120 capsule    Take 200 mg by mouth every 4 hours as needed for fever       nystatin-triamcinolone ointment    MYCOLOG    30 g    Apply topically 2 times daily

## 2017-03-21 LAB
ALBUMIN SERPL-MCNC: 4 G/DL (ref 3.4–5)
ALP SERPL-CCNC: 105 U/L (ref 40–150)
ALT SERPL W P-5'-P-CCNC: 62 U/L (ref 0–50)
ANION GAP SERPL CALCULATED.3IONS-SCNC: 4 MMOL/L (ref 3–14)
AST SERPL W P-5'-P-CCNC: 30 U/L (ref 0–45)
BILIRUB SERPL-MCNC: 0.4 MG/DL (ref 0.2–1.3)
BUN SERPL-MCNC: 12 MG/DL (ref 7–30)
CALCIUM SERPL-MCNC: 9.5 MG/DL (ref 8.5–10.1)
CHLORIDE SERPL-SCNC: 107 MMOL/L (ref 94–109)
CO2 SERPL-SCNC: 30 MMOL/L (ref 20–32)
CREAT SERPL-MCNC: 0.68 MG/DL (ref 0.52–1.04)
GFR SERPL CREATININE-BSD FRML MDRD: 90 ML/MIN/1.7M2
GLUCOSE SERPL-MCNC: 80 MG/DL (ref 70–99)
POTASSIUM SERPL-SCNC: 4.1 MMOL/L (ref 3.4–5.3)
PROT SERPL-MCNC: 7.4 G/DL (ref 6.8–8.8)
SODIUM SERPL-SCNC: 141 MMOL/L (ref 133–144)

## 2017-03-23 ENCOUNTER — TELEPHONE (OUTPATIENT)
Dept: NURSING | Facility: CLINIC | Age: 57
End: 2017-03-23

## 2017-03-23 ENCOUNTER — TELEPHONE (OUTPATIENT)
Dept: FAMILY MEDICINE | Facility: CLINIC | Age: 57
End: 2017-03-23

## 2017-03-23 DIAGNOSIS — N39.0 URINARY TRACT INFECTION: Primary | ICD-10-CM

## 2017-03-23 RX ORDER — CEPHALEXIN 500 MG/1
500 CAPSULE ORAL 2 TIMES DAILY
Qty: 14 CAPSULE | Refills: 0 | Status: SHIPPED | OUTPATIENT
Start: 2017-03-23 | End: 2017-03-30

## 2017-03-24 NOTE — TELEPHONE ENCOUNTER
Patient  Was seen in clinic 3-20 , for follow up on UTI sx, she did not have treatment that day, and  Provider advised her to call if she had increased symptoms, and she would treat over phone . Call after hours was screened, and on call provider , Dr Saul Hobbs, gave an  Order to  Provide Keflex 500 mg BID fro 7 days , with follow up sooner if not resolving symptoms. Medication was sent to  [facility], . She was experiencing Dysuria, frequency, and urgency , afebrile, and no flank pain .

## 2017-03-24 NOTE — PATIENT INSTRUCTIONS
Patient was asked to call back if symptomas are not inporoving, and to follow up with primary upon completion for next step in treatment plan .

## 2017-03-24 NOTE — TELEPHONE ENCOUNTER
Call Type: Triage Call    Presenting Problem:  I was in recently , and followed up on a UTI, my  symptoms are worse now, and the doctor asked me to call if I wanted  to treat . The burning is back , and urgency , frequency . No fever,  no flamnk pain . I do not want to use Cipro agaimn, Noted she had  increased Joint pain with this medication . Paged on call provider,  Dr Hobbs  to [facility] [phone] via smart web  On call order  wa given  for antibiotic, Keflex 500mg BID for 7 days, and sent to pharmacy of  choice .  Triage Note:  Guideline Title: Urinary Symptoms - Female ; Urinary Symptoms - Female  Recommended Disposition: See Provider within 24 hours  Original Inclination: Wanted to speak with a nurse  Override Disposition: Call Provider Immediately  Intended Action: Call PCP/HCP  Physician Contacted: No  Evaluated by provider AND symptoms worsening after following recommended treatment  plan for 72 hours ?  YES  Blood in urine ? NO  Abnormal vaginal discharge (such as increased quantity, abnormal color,  consistency, odor) ? NO  Flank pain ? NO  New or worsening signs and symptoms that may indicate shock ? NO  Any temperature elevation in a frail elderly, immunocompromised or pregnant person  ? NO  Unbearable abdominal/pelvic pain ? NO  Current or recent urinary tract instrumentation AND urinary tract symptoms OR no  urine flow ? NO  Urinary tract symptoms AND any flank or low back pain ? NO  Genital itching, burning or redness ? NO  Urinary tract symptoms AND fever 101.5 F (38.6 C) or higher or vomiting ? NO  Has one or more urinary tract symptoms AND has not been previously evaluated ? NO  First time occurrence of foul-smelling or unusual vaginal discharge ? NO  No urination for 12 or more hours ? NO  Any other unexpected urinary symptoms following urinary tract or abdominal surgery  within timeframe specified by provider ? NO  Physician Instructions:  Care Advice: Call provider if urine is pink, red, smoky or  cola colored.  Limit carbonated, alcoholic, and caffeinated beverages such as coffee, tea  and soda.  Avoid nonprescription cold and allergy medications that contain  caffeine.  Limit intake of tomatoes, fruit juices (except for unsweetened  cranberry juice), dairy products, spicy foods, sugar, and artificial  sweeteners (aspartame or saccharine).  Stop or decrease smoking.  Reducing  exposure to bladder irritants may help lessen urgency.  Increase intake of fluids. Try to drink 8 oz. (.2 liter) every hour when  awake, unless on restricted fluids for other medical reasons. Include at  least two 8 oz. (.2 liter) glasses of unsweetened cranberry juice each day.  Take sips of fluid or eat ice chips if nauseated or vomiting.

## 2017-03-27 ENCOUNTER — TELEPHONE (OUTPATIENT)
Dept: FAMILY MEDICINE | Facility: CLINIC | Age: 57
End: 2017-03-27

## 2017-03-27 DIAGNOSIS — R30.0 DYSURIA: Primary | ICD-10-CM

## 2017-03-27 NOTE — TELEPHONE ENCOUNTER
I returned her call. I suspect that she is having continued problems with dysuria. I would recommend referral to urology to make sure we aren't missing something. Repeated antibiotics is not the best option. Advised patient to call back with her thoughts and I can help facilitate. Explained that I will be with patients most of the day.

## 2017-03-28 NOTE — TELEPHONE ENCOUNTER
Options include pelvic ultrasound or CT of abdomen and pelvis. Not really any specific lab tests although could come in for another UA and CBC as a lab only. We will figure this out. Please reassure.

## 2017-03-28 NOTE — TELEPHONE ENCOUNTER
Pt informed and will check with her two daughters that are RNs and see what they would recommend for her.  She will call back later to schedule a lab only appt and call the priceline about the cost of the US vs CT scan and let us know what she would like to proceed with.      Alphonso Aquino RN, BSN

## 2017-03-28 NOTE — TELEPHONE ENCOUNTER
"Spoke with patient, who became tearful on the phone because her  lost his job yesterday and is under a lot of stress.  She is still having burning with urination and now is having left lower back pain \"like a fist\" and has a family history ureter cancer and paternal aunt and cousin who has ovarian cancer and  in their 50s. Pt had a hysterectomy but does have her ovaries.     She is concerned about money and if there is anything that can be done that won't cost as much due to the insurance and income.  They have insurance until May and then will be on Cobra.  Pt is willing to see urology if need but wondering if there are any ultrasounds or tests that can be done first.    Please advise    Alphonso Aquino RN, BSN    "

## 2017-04-03 ENCOUNTER — OFFICE VISIT (OUTPATIENT)
Dept: FAMILY MEDICINE | Facility: CLINIC | Age: 57
End: 2017-04-03
Payer: COMMERCIAL

## 2017-04-03 VITALS
OXYGEN SATURATION: 94 % | TEMPERATURE: 98.3 F | SYSTOLIC BLOOD PRESSURE: 112 MMHG | HEART RATE: 80 BPM | DIASTOLIC BLOOD PRESSURE: 73 MMHG

## 2017-04-03 DIAGNOSIS — R68.89 INFLUENZA-LIKE SYMPTOMS: Primary | ICD-10-CM

## 2017-04-03 PROCEDURE — 99213 OFFICE O/P EST LOW 20 MIN: CPT | Performed by: FAMILY MEDICINE

## 2017-04-03 RX ORDER — OSELTAMIVIR PHOSPHATE 75 MG/1
75 CAPSULE ORAL 2 TIMES DAILY
Qty: 10 CAPSULE | Refills: 0 | Status: SHIPPED | OUTPATIENT
Start: 2017-04-03 | End: 2017-05-26

## 2017-04-03 RX ORDER — BENZONATATE 200 MG/1
200 CAPSULE ORAL 3 TIMES DAILY PRN
Qty: 21 CAPSULE | Refills: 0 | Status: SHIPPED | OUTPATIENT
Start: 2017-04-03 | End: 2017-05-26

## 2017-04-03 RX ORDER — OSELTAMIVIR PHOSPHATE 75 MG/1
75 CAPSULE ORAL 2 TIMES DAILY
Qty: 10 CAPSULE | Refills: 0 | Status: SHIPPED | OUTPATIENT
Start: 2017-04-03 | End: 2017-04-03

## 2017-04-03 NOTE — NURSING NOTE
"No chief complaint on file.      Initial /73 (BP Location: Right arm, Patient Position: Chair, Cuff Size: Adult Large)  Pulse 80  Temp 98.3  F (36.8  C) (Oral)  LMP 12/05/2013  SpO2 94% Estimated body mass index is 33.99 kg/(m^2) as calculated from the following:    Height as of 3/20/17: 5' 7\" (1.702 m).    Weight as of 3/20/17: 217 lb (98.4 kg).  Medication Reconciliation: complete     Arleen Epperson CMA (AAMA)        "

## 2017-04-03 NOTE — MR AVS SNAPSHOT
After Visit Summary   4/3/2017    Shaye Brian    MRN: [id number]           Patient Information     Date Of Birth          1960        Visit Information        Provider Department      4/3/2017 2:15 PM Sushil Bobby MD [facility]        Today's Diagnoses     Influenza-like symptoms    -  1       Follow-ups after your visit        Who to contact     If you have questions or need follow up information about today's clinic visit or your schedule please contact [facility] directly at [phone].  Normal or non-critical lab and imaging results will be communicated to you by Sol[employer], letter or phone within 4 business days after the clinic has received the results. If you do not hear from us within 7 days, please contact the clinic through [employer] or phone. If you have a critical or abnormal lab result, we will notify you by phone as soon as possible.  Submit refill requests through [employer] or call your pharmacy and they will forward the refill request to us. Please allow 3 business days for your refill to be completed.          Additional Information About Your Visit        MyChart Information     [employer] gives you secure access to your electronic health record. If you see a primary care provider, you can also send messages to your care team and make appointments. If you have questions, please call your primary care clinic.  If you do not have a primary care provider, please call [phone] and they will assist you.        Care EveryWhere ID     This is your Care EveryWhere ID. This could be used by other organizations to access your [facility] medical records  [id number]        Your Vitals Were     Pulse Temperature Last Period Pulse Oximetry          80 98.3  F (36.8  C) (Oral) 12/05/2013 94%         Blood Pressure from Last 3 Encounters:   04/03/17 112/73   03/20/17 116/74   03/11/17 110/70    Weight from Last 3 Encounters:   03/20/17 217 lb (98.4 kg)    03/11/17 216 lb (98 kg)   02/15/17 216 lb (98 kg)              Today, you had the following     No orders found for display         Today's Medication Changes          These changes are accurate as of: 4/3/17  2:34 PM.  If you have any questions, ask your nurse or doctor.               Start taking these medicines.        Dose/Directions    benzonatate 200 MG capsule   Commonly known as:  TESSALON   Used for:  Influenza-like symptoms        Dose:  200 mg   Take 1 capsule (200 mg) by mouth 3 times daily as needed for cough   Quantity:  21 capsule   Refills:  0       oseltamivir 75 MG capsule   Commonly known as:  TAMIFLU   Used for:  Influenza-like symptoms        Dose:  75 mg   Take 1 capsule (75 mg) by mouth 2 times daily   Quantity:  10 capsule   Refills:  0            Where to get your medicines      These medications were sent to [facility] [address] [place] [address] [facility]  [id number] [place] [address]     Phone:  [phone]     benzonatate 200 MG capsule    oseltamivir 75 MG capsule                Primary Care Provider Office Phone # Fax #    She March [phone]       [facility] [address] [address] [facility] [address]        Thank you!     Thank you for choosing [facility]  for your care. Our goal is always to provide you with excellent care. Hearing back from our patients is one way we can continue to improve our services. Please take a few minutes to complete the written survey that you may receive in the mail after your visit with us. Thank you!             Your Updated Medication List - Protect others around you: Learn how to safely use, store and throw away your medicines at www.disposemymeds.org.          This list is accurate as of: 4/3/17  2:34 PM.  Always use your most recent med list.                   Brand Name Dispense Instructions for use    benzonatate 200 MG capsule    TESSALON    21 capsule    Take 1 capsule (200 mg) by  mouth 3 times daily as needed for cough       cetirizine-psuedoePHEDrine 5-120 MG per 12 hr tablet    zyrTEC-D    180 tablet    Take 1 tablet by mouth 2 times daily       cyclobenzaprine 10 MG tablet    FLEXERIL    30 tablet    Take 0.5-1 tablets (5-10 mg) by mouth 3 times daily as needed for muscle spasms       diphenhydrAMINE 25 MG tablet    BENADRYL    60 tablet    Take 1-2 tablets by mouth every 6 hours as needed for itching.       ibuprofen 200 MG capsule     120 capsule    Take 200 mg by mouth every 4 hours as needed for fever       nystatin-triamcinolone ointment    MYCOLOG    30 g    Apply topically 2 times daily       oseltamivir 75 MG capsule    TAMIFLU    10 capsule    Take 1 capsule (75 mg) by mouth 2 times daily

## 2017-05-24 ENCOUNTER — TELEPHONE (OUTPATIENT)
Dept: NURSING | Facility: CLINIC | Age: 57
End: 2017-05-24

## 2017-05-24 NOTE — TELEPHONE ENCOUNTER
"Call Type: Triage Call    Presenting Problem: \"Urgency to urinate, I think I'm getting a  bladder infection.\"  Triage Note:  Guideline Title: Urinary Symptoms - Female  Recommended Disposition: See Provider within 24 hours  Original Inclination: Wanted to speak with a nurse  Override Disposition:  Intended Action: See /Jesus Appt  Physician Contacted: No  Has one or more urinary tract symptoms AND has not been previously evaluated ?  YES  Blood in urine ? NO  Abnormal vaginal discharge (such as increased quantity, abnormal color,  consistency, odor) ? NO  Flank pain ? NO  New or worsening signs and symptoms that may indicate shock ? NO  Any temperature elevation in a frail elderly, immunocompromised or pregnant person  ? NO  Unbearable abdominal/pelvic pain ? NO  Current or recent urinary tract instrumentation AND urinary tract symptoms OR no  urine flow ? NO  Urinary tract symptoms AND any flank or low back pain ? NO  Urinary tract symptoms AND fever 101.5 F (38.6 C) or higher or vomiting ? NO  No urination for 12 or more hours ? NO  Any other unexpected urinary symptoms following urinary tract or abdominal surgery  within timeframe specified by provider ? NO  Physician Instructions:  Care Advice: SYMPTOM / CONDITION MANAGEMENT  "

## 2017-05-26 ENCOUNTER — OFFICE VISIT (OUTPATIENT)
Dept: FAMILY MEDICINE | Facility: CLINIC | Age: 57
End: 2017-05-26
Payer: COMMERCIAL

## 2017-05-26 VITALS
BODY MASS INDEX: 33.74 KG/M2 | OXYGEN SATURATION: 96 % | WEIGHT: 215 LBS | RESPIRATION RATE: 16 BRPM | HEIGHT: 67 IN | TEMPERATURE: 97.6 F | HEART RATE: 69 BPM | DIASTOLIC BLOOD PRESSURE: 70 MMHG | SYSTOLIC BLOOD PRESSURE: 110 MMHG

## 2017-05-26 DIAGNOSIS — R30.0 DYSURIA: Primary | ICD-10-CM

## 2017-05-26 LAB
BACTERIA #/AREA URNS HPF: ABNORMAL /HPF
NON-SQ EPI CELLS #/AREA URNS LPF: ABNORMAL /LPF
RBC #/AREA URNS AUTO: ABNORMAL /HPF (ref 0–2)
WBC #/AREA URNS AUTO: ABNORMAL /HPF (ref 0–2)

## 2017-05-26 PROCEDURE — 81015 MICROSCOPIC EXAM OF URINE: CPT | Performed by: NURSE PRACTITIONER

## 2017-05-26 PROCEDURE — 99213 OFFICE O/P EST LOW 20 MIN: CPT | Performed by: NURSE PRACTITIONER

## 2017-05-26 RX ORDER — CIPROFLOXACIN 500 MG/1
500 TABLET, FILM COATED ORAL 2 TIMES DAILY
Qty: 14 TABLET | Refills: 0 | Status: SHIPPED | OUTPATIENT
Start: 2017-05-26 | End: 2017-07-25

## 2017-05-26 NOTE — PROGRESS NOTES
SUBJECTIVE:                                                    Shaye Brian is a 56 year old female who presents to clinic today for the following health issues:      URINARY TRACT SYMPTOMS      Duration: x 2 days    Description  dysuria, frequency, urgency and back pain    Intensity:  moderate    Accompanying signs and symptoms:  Fever/chills: chills  Flank pain YES  Nausea and vomiting: no   Vaginal symptoms: none  Abdominal/Pelvic Pain: no     History  History of frequent UTI's: YES  History of kidney stones: no   Sexually Active: YES  Possibility of pregnancy: No    Precipitating or alleviating factors: None    Therapies tried and outcome: took a pill, not sure of the name   Outcome: some relief   Patient is here with complaints of dysuria and frequency for the past 2 days. Started taking OTC product that has changed her urine orange. Symptoms still persist and is concerned about the upcoming long weekend. Trying to increase water intake. Denies back pain or fever.         Problem list and histories reviewed & adjusted, as indicated.  Additional history: none    Patient Active Problem List   Diagnosis     Hyperlipidemia LDL goal <160     Insomnia     Snoring     Menorrhagia     Anemia     Uterine fibroid     Thickened endometrium     S/P hysterectomy     Past Surgical History:   Procedure Laterality Date     D & C  1984    after miscarriage-first time     DAVINCI HYSTERECTOMY TOTAL, BILATERAL SALPINGO-OOPHORECTOMY, COMBINED  12/27/2013    Procedure: COMBINED DAVINCI HYSTERECTOMY TOTAL, SALPINGO-OOPHORECTOMY;  Combined Davinci Total Laparoscopic HYSTERECTOMY, Bilateral Salpingectomy, Cystoscopy;  Surgeon: Karolina Eduardo DO;  Location:  OR       Social History   Substance Use Topics     Smoking status: Never Smoker     Smokeless tobacco: Never Used     Alcohol use 0.0 oz/week     0 Standard drinks or equivalent per week      Comment: socially -once every one month      Family History   Problem  "Relation Age of Onset     Alzheimer Disease Mother      macular degeneration     CANCER Mother      brain tumor     Hypertension Mother      HEART DISEASE Father      CANCER Father      ureter cancer     Respiratory Father      emphysema, smoker     Breast Cancer Other      maternal aunt-using hormones     CANCER Other      dad's sister with ovarian cancer, her daughter had ovarian cancer     Cancer - colorectal No family hx of            Reviewed and updated as needed this visit by clinical staff       Reviewed and updated as needed this visit by Provider         ROS:  Constitutional, HEENT, cardiovascular, pulmonary, gi and gu systems are negative, except as otherwise noted.    OBJECTIVE:                                                    /70 (BP Location: Right arm, Patient Position: Chair, Cuff Size: Adult Regular)  Pulse 69  Temp 97.6  F (36.4  C) (Oral)  Resp 16  Ht 5' 7\" (1.702 m)  Wt 215 lb (97.5 kg)  LMP 12/05/2013  SpO2 96%  BMI 33.67 kg/m2  Body mass index is 33.67 kg/(m^2).  GENERAL: healthy, alert and no distress  BACK: no CVA tenderness, no paralumbar tenderness  PSYCH: mentation appears normal, affect normal/bright    Results for orders placed or performed in visit on 05/26/17 (from the past 24 hour(s))   Urine Microscopic   Result Value Ref Range    WBC Urine 5-10 (A) 0 - 2 /HPF    RBC Urine O - 2 0 - 2 /HPF    Squamous Epithelial /LPF Urine Few FEW /LPF    Bacteria Urine Few (A) NEG /HPF        ASSESSMENT/PLAN:                                                            1. Dysuria  Due to history of UTI's will start ciprofloxacin BID for the weekend. Referral to urology since having recurrent infections. Encouraged increasing fluid intake.   - Urine Microscopic  - ciprofloxacin (CIPRO) 500 MG tablet; Take 1 tablet (500 mg) by mouth 2 times daily  Dispense: 14 tablet; Refill: 0  - UROLOGY ADULT REFERRAL    Follow up as needed.     She March, NP  [facility]    "

## 2017-05-26 NOTE — NURSING NOTE
"Chief Complaint   Patient presents with     UTI       Initial /70 (BP Location: Right arm, Patient Position: Chair, Cuff Size: Adult Regular)  Pulse 69  Temp 97.6  F (36.4  C) (Oral)  Resp 16  Ht 5' 7\" (1.702 m)  Wt 215 lb (97.5 kg)  LMP 12/05/2013  SpO2 96%  BMI 33.67 kg/m2 Estimated body mass index is 33.67 kg/(m^2) as calculated from the following:    Height as of this encounter: 5' 7\" (1.702 m).    Weight as of this encounter: 215 lb (97.5 kg).  Medication Reconciliation: complete     Salbador Nur CMA      "

## 2017-05-26 NOTE — MR AVS SNAPSHOT
After Visit Summary   5/26/2017    Shaye Brian    MRN: [id number]           Patient Information     Date Of Birth          1960        Visit Information        Provider Department      5/26/2017 2:15 PM She March NP [facility]        Today's Diagnoses     Dysuria    -  1       Follow-ups after your visit        Additional Services     UROLOGY ADULT REFERRAL       Your provider has referred you to: [facility] [place] [phone]   [email or web address]    Please be aware that coverage of these services is subject to the terms and limitations of your health insurance plan.  Call member services at your health plan with any benefit or coverage questions.      Please bring the following with you to your appointment:    (1) Any X-Rays, CTs or MRIs which have been performed.  Contact the facility where they were done to arrange for  prior to your scheduled appointment.    (2) List of current medications  (3) This referral request   (4) Any documents/labs given to you for this referral                  Who to contact     If you have questions or need follow up information about today's clinic visit or your schedule please contact [facility] directly at [phone].  Normal or non-critical lab and imaging results will be communicated to you by MyChart, letter or phone within 4 business days after the clinic has received the results. If you do not hear from us within 7 days, please contact the clinic through MyChart or phone. If you have a critical or abnormal lab result, we will notify you by phone as soon as possible.  Submit refill requests through [employer] or call your pharmacy and they will forward the refill request to us. Please allow 3 business days for your refill to be completed.          Additional Information About Your Visit        [employer] Information     [employer] gives you secure access to your  "electronic health record. If you see a primary care provider, you can also send messages to your care team and make appointments. If you have questions, please call your primary care clinic.  If you do not have a primary care provider, please call [phone] and they will assist you.        Care EveryWhere ID     This is your Care EveryWhere ID. This could be used by other organizations to access your [facility] medical records  [id number]        Your Vitals Were     Pulse Temperature Respirations Height Last Period Pulse Oximetry    69 97.6  F (36.4  C) (Oral) 16 5' 7\" (1.702 m) 12/05/2013 96%    BMI (Body Mass Index)                   33.67 kg/m2            Blood Pressure from Last 3 Encounters:   05/26/17 110/70   04/03/17 112/73   03/20/17 116/74    Weight from Last 3 Encounters:   05/26/17 215 lb (97.5 kg)   03/20/17 217 lb (98.4 kg)   03/11/17 216 lb (98 kg)              We Performed the Following     Urine Microscopic     UROLOGY ADULT REFERRAL          Today's Medication Changes          These changes are accurate as of: 5/26/17  2:37 PM.  If you have any questions, ask your nurse or doctor.               Start taking these medicines.        Dose/Directions    ciprofloxacin 500 MG tablet   Commonly known as:  CIPRO   Used for:  Dysuria   Started by:  She March NP        Dose:  500 mg   Take 1 tablet (500 mg) by mouth 2 times daily   Quantity:  14 tablet   Refills:  0            Where to get your medicines      These medications were sent to [facility] [address] [place] [address] [facility]  [address] [facility] [address]     Phone:  [phone]     ciprofloxacin 500 MG tablet                Primary Care Provider Office Phone # Fax #    She March [phone]       [facility] [address]  [facility] [address]        Thank you!     Thank you for choosing [facility]  for your care. Our goal is always to provide you with excellent care. " Hearing back from our patients is one way we can continue to improve our services. Please take a few minutes to complete the written survey that you may receive in the mail after your visit with us. Thank you!             Your Updated Medication List - Protect others around you: Learn how to safely use, store and throw away your medicines at www.disposemymeds.org.          This list is accurate as of: 5/26/17  2:37 PM.  Always use your most recent med list.                   Brand Name Dispense Instructions for use    cetirizine-psuedoePHEDrine 5-120 MG per 12 hr tablet    zyrTEC-D    180 tablet    Take 1 tablet by mouth 2 times daily       ciprofloxacin 500 MG tablet    CIPRO    14 tablet    Take 1 tablet (500 mg) by mouth 2 times daily       cyclobenzaprine 10 MG tablet    FLEXERIL    30 tablet    Take 0.5-1 tablets (5-10 mg) by mouth 3 times daily as needed for muscle spasms       diphenhydrAMINE 25 MG tablet    BENADRYL    60 tablet    Take 1-2 tablets by mouth every 6 hours as needed for itching.       ibuprofen 200 MG capsule     120 capsule    Take 200 mg by mouth every 4 hours as needed for fever       nystatin-triamcinolone ointment    MYCOLOG    30 g    Apply topically 2 times daily

## 2017-07-25 ENCOUNTER — TELEPHONE (OUTPATIENT)
Dept: FAMILY MEDICINE | Facility: CLINIC | Age: 57
End: 2017-07-25

## 2017-07-25 DIAGNOSIS — R30.0 DYSURIA: ICD-10-CM

## 2017-07-25 RX ORDER — CIPROFLOXACIN 500 MG/1
500 TABLET, FILM COATED ORAL 2 TIMES DAILY
Qty: 14 TABLET | Refills: 0 | Status: SHIPPED | OUTPATIENT
Start: 2017-07-25 | End: 2017-08-11

## 2017-08-08 ENCOUNTER — OFFICE VISIT (OUTPATIENT)
Dept: OPTOMETRY | Facility: CLINIC | Age: 57
End: 2017-08-08
Payer: COMMERCIAL

## 2017-08-08 DIAGNOSIS — H52.4 PRESBYOPIA: Primary | ICD-10-CM

## 2017-08-08 PROCEDURE — 92015 DETERMINE REFRACTIVE STATE: CPT | Performed by: OPTOMETRIST

## 2017-08-08 PROCEDURE — 92004 COMPRE OPH EXAM NEW PT 1/>: CPT | Performed by: OPTOMETRIST

## 2017-08-08 ASSESSMENT — REFRACTION_WEARINGRX
SPECS_TYPE: OTC READERS
OS_SPHERE: +2.50
OD_SPHERE: +2.50

## 2017-08-08 ASSESSMENT — KERATOMETRY
OS_AXISANGLE2_DEGREES: 119
OS_K1POWER_DIOPTERS: 44.12
OS_K2POWER_DIOPTERS: 44.87
METHOD_AUTO_MANUAL: AUTOMATED
OD_K1POWER_DIOPTERS: 44.25
OS_AXISANGLE_DEGREES: 29
OD_AXISANGLE2_DEGREES: 155
OD_K2POWER_DIOPTERS: 44.62
OD_AXISANGLE_DEGREES: 65

## 2017-08-08 ASSESSMENT — REFRACTION_MANIFEST
OS_SPHERE: PLANO
OD_SPHERE: +0.75
OD_ADD: +2.50
OS_SPHERE: +0.25
OS_ADD: +2.50
METHOD_AUTOREFRACTION: 1
OS_CYLINDER: +0.25
OD_SPHERE: PLANO
OS_AXIS: 82

## 2017-08-08 ASSESSMENT — CUP TO DISC RATIO
OS_RATIO: 0.25
OD_RATIO: 0.25

## 2017-08-08 ASSESSMENT — VISUAL ACUITY
OS_SC: 20/20
OS_CC: 20/20
OD_SC: 20/20
OD_CC: 20/20
METHOD: SNELLEN - LINEAR
OD_SC+: -1

## 2017-08-08 ASSESSMENT — EXTERNAL EXAM - RIGHT EYE: OD_EXAM: NORMAL

## 2017-08-08 ASSESSMENT — TONOMETRY
OS_IOP_MMHG: 17
IOP_METHOD: APPLANATION
OD_IOP_MMHG: 17

## 2017-08-08 ASSESSMENT — SLIT LAMP EXAM - LIDS
COMMENTS: NORMAL
COMMENTS: NORMAL

## 2017-08-08 ASSESSMENT — EXTERNAL EXAM - LEFT EYE: OS_EXAM: NORMAL

## 2017-08-08 ASSESSMENT — CONF VISUAL FIELD
OD_NORMAL: 1
OS_NORMAL: 1

## 2017-08-08 NOTE — PATIENT INSTRUCTIONS
Using OTC Zaditor or Alaway- 1 drop in both eyes 2 x day along with cold compresses  And frequent eye/ brow washing will help for itchy, watery eyes.   May use artificial tears  As needed for dry eye and to help flush allergens   Your prescription is in your chart if you want to get a bifocal so you can look in the distance as well as near with glasses on, there is no correction on the top  So it is a much more costly option than over the counter readers    No retinal problems   Mild dry eye / allergic conjunctivitis

## 2017-08-08 NOTE — PROGRESS NOTES
Chief Complaint   Patient presents with     COMPREHENSIVE EYE EXAM     Routine        Last Eye Exam: 10yrs  Dilated Previously: Yes    What are you currently using to see?  readers       Distance Vision Acuity: Satisfied with vision    Near Vision Acuity: Not satisfied     Eye Comfort: itchy, helps uses a couple times daily   Do you use eye drops? : Yes: OTC allergy  Occupation or Hobbies: Reading              Medical, surgical and family histories reviewed and updated 8/8/2017.       OBJECTIVE: See Ophthalmology exam    ASSESSMENT:    ICD-10-CM    1. Presbyopia H52.4         PLAN:   Continue with readers     Stefania Sequeira OD

## 2017-08-08 NOTE — MR AVS SNAPSHOT
After Visit Summary   8/8/2017    Shaye Brian    MRN: [id number]           Patient Information     Date Of Birth          1960        Visit Information        Provider Department      8/8/2017 1:00 PM Stefania Sequeira OD [facility]        Today's Diagnoses     Presbyopia    -  1      Care Instructions    Using OTC Zaditor or Alaway- 1 drop in both eyes 2 x day along with cold compresses  And frequent eye/ brow washing will help for itchy, watery eyes.   May use artificial tears  As needed for dry eye and to help flush allergens   Your prescription is in your chart if you want to get a bifocal so you can look in the distance as well as near with glasses on, there is no correction on the top  So it is a much more costly option than over the counter readers    No retinal problems   Mild dry eye / allergic conjunctivitis           Follow-ups after your visit        Your next 10 appointments already scheduled     Aug 11, 2017  2:00 PM CDT   New Patient Visit with Karo Tenorio PA-C   [facility] ([facility])    [address]  [address]  [facility] [address] [phone]              Who to contact     If you have questions or need follow up information about today's clinic visit or your schedule please contact [facility] directly at [phone].  Normal or non-critical lab and imaging results will be communicated to you by MyChart, letter or phone within 4 business days after the clinic has received the results. If you do not hear from us within 7 days, please contact the clinic through MyChart or phone. If you have a critical or abnormal lab result, we will notify you by phone as soon as possible.  Submit refill requests through Naroomi or call your pharmacy and they will forward the refill request to us. Please allow 3 business days for your refill to be completed.          Additional Information  About Your Visit        PostRankhart Information     [email or web address] gives you secure access to your electronic health record. If you see a primary care provider, you can also send messages to your care team and make appointments. If you have questions, please call your primary care clinic.  If you do not have a primary care provider, please call [phone] and they will assist you.        Care EveryWhere ID     This is your Care EveryWhere ID. This could be used by other organizations to access your [facility] medical records  [id number]        Your Vitals Were     Last Period                   12/05/2013            Blood Pressure from Last 3 Encounters:   05/26/17 110/70   04/03/17 112/73   03/20/17 116/74    Weight from Last 3 Encounters:   05/26/17 97.5 kg (215 lb)   03/20/17 98.4 kg (217 lb)   03/11/17 98 kg (216 lb)              We Performed the Following     EYE EXAM (SIMPLE-NONBILLABLE)     REFRACTION        Primary Care Provider Office Phone # Fax #    Shecassandra March [phone]       [facility] [address] [facility] [address]        Equal Access to Services     [facility] : Hadii aad ku hadasho Soomaali, waaxda luqadaha, qaybta kaalmada adeegyada, waxay yasmeenin hayaan jnenifer escobar . So [facility] [phone].    ATENCIÓN: Si habla español, tiene a do disposición servicios gratuitos de asistencia lingüística. Llame al [phone].    We comply with applicable federal civil rights laws and [place] laws. We do not discriminate on the basis of race, color, national origin, age, disability sex, sexual orientation or gender identity.            Thank you!     Thank you for choosing [facility]  for your care. Our goal is always to provide you with excellent care. Hearing back from our patients is one way we can continue to improve our services. Please take a few minutes to complete the written survey that you may receive in the mail after your visit with us. Thank you!              Your Updated Medication List - Protect others around you: Learn how to safely use, store and throw away your medicines at www.disposemymeds.org.          This list is accurate as of: 8/8/17  2:01 PM.  Always use your most recent med list.                   Brand Name Dispense Instructions for use Diagnosis    cetirizine-psuedoePHEDrine 5-120 MG per 12 hr tablet    zyrTEC-D    180 tablet    Take 1 tablet by mouth 2 times daily    Seasonal allergic rhinitis, unspecified allergic rhinitis trigger       ciprofloxacin 500 MG tablet    CIPRO    14 tablet    Take 1 tablet (500 mg) by mouth 2 times daily    Dysuria       diphenhydrAMINE 25 MG tablet    BENADRYL    60 tablet    Take 1-2 tablets by mouth every 6 hours as needed for itching.        ibuprofen 200 MG capsule     120 capsule    Take 200 mg by mouth every 4 hours as needed for fever        nystatin-triamcinolone ointment    MYCOLOG    30 g    Apply topically 2 times daily    Intertrigo

## 2017-08-10 DIAGNOSIS — R30.0 DYSURIA: Primary | ICD-10-CM

## 2017-08-11 ENCOUNTER — OFFICE VISIT (OUTPATIENT)
Dept: UROLOGY | Facility: CLINIC | Age: 57
End: 2017-08-11
Payer: COMMERCIAL

## 2017-08-11 VITALS
HEIGHT: 69 IN | DIASTOLIC BLOOD PRESSURE: 78 MMHG | BODY MASS INDEX: 31.55 KG/M2 | SYSTOLIC BLOOD PRESSURE: 108 MMHG | HEART RATE: 76 BPM | WEIGHT: 213 LBS

## 2017-08-11 DIAGNOSIS — R30.0 DYSURIA: ICD-10-CM

## 2017-08-11 LAB
ALBUMIN UR-MCNC: NEGATIVE MG/DL
APPEARANCE UR: CLEAR
BACTERIA SPEC CULT: NORMAL
BACTERIA SPEC CULT: NORMAL
BILIRUB UR QL STRIP: NEGATIVE
COLOR UR AUTO: YELLOW
GLUCOSE UR STRIP-MCNC: NEGATIVE MG/DL
HGB UR QL STRIP: ABNORMAL
KETONES UR STRIP-MCNC: NEGATIVE MG/DL
LEUKOCYTE ESTERASE UR QL STRIP: ABNORMAL
MICRO REPORT STATUS: NORMAL
MICRO REPORT STATUS: NORMAL
NITRATE UR QL: NEGATIVE
PH UR STRIP: 5.5 PH (ref 5–7)
RBC #/AREA URNS AUTO: <1 /HPF (ref 0–2)
RESIDUAL VOLUME (RV) (EXTERNAL): 0
SP GR UR STRIP: 1.01 (ref 1–1.03)
SPECIMEN SOURCE: NORMAL
SPECIMEN SOURCE: NORMAL
URN SPEC COLLECT METH UR: ABNORMAL
UROBILINOGEN UR STRIP-ACNC: 0.2 EU/DL (ref 0.2–1)
WBC #/AREA URNS AUTO: 1 /HPF (ref 0–2)

## 2017-08-11 PROCEDURE — 87109 MYCOPLASMA: CPT | Mod: 59 | Performed by: PHYSICIAN ASSISTANT

## 2017-08-11 PROCEDURE — 51798 US URINE CAPACITY MEASURE: CPT | Performed by: PHYSICIAN ASSISTANT

## 2017-08-11 PROCEDURE — 81001 URINALYSIS AUTO W/SCOPE: CPT | Performed by: PHYSICIAN ASSISTANT

## 2017-08-11 PROCEDURE — 99000 SPECIMEN HANDLING OFFICE-LAB: CPT | Performed by: PHYSICIAN ASSISTANT

## 2017-08-11 PROCEDURE — 87086 URINE CULTURE/COLONY COUNT: CPT | Performed by: PHYSICIAN ASSISTANT

## 2017-08-11 PROCEDURE — 87109 MYCOPLASMA: CPT | Performed by: PHYSICIAN ASSISTANT

## 2017-08-11 PROCEDURE — 99203 OFFICE O/P NEW LOW 30 MIN: CPT | Mod: 25 | Performed by: PHYSICIAN ASSISTANT

## 2017-08-11 ASSESSMENT — PAIN SCALES - GENERAL: PAINLEVEL: MILD PAIN (3)

## 2017-08-11 NOTE — LETTER
8/11/2017       RE: Shaye Brian  [address] [facility] [address]     Dear Colleague,    Thank you for referring your patient, Shaye Brian, to the [facility] [place] at [facility]. Please see a copy of my visit note below.    It was my pleasure to meet Ms. Shaye Brian, a 56 year old year old female seen in consultation today at the request of She March NP  for chief complaint of: Urinary Problem (Recurrent UTI's.)      HPI: She is not immunosuppressed.  Today she complains of recent history of frequent urinary tract infections, and states has had 5 in the last 6 months.  These are typically symptomatic.  Typical symptoms include:  Freq, urgency, spasms. She denies gross hematuria, flank pain, fevers, chills.  She has no history of stones.  She has never been hospitalized for UTIs.   With infection, Ms. Shaye Brian typically goes to [facility] where cultures are sometimes performed revealing E. Coli.  Is usually given an antibiotic and reports that symptoms do resolve appropriately with therapy. More recently, she has been treated based on symptoms alone.     -Daily Fluid intake: water    -Bowels: Regular  -Gynecologic Hx:  Hyst    Past Medical History:   Diagnosis Date     Fibroids        Past Surgical History:   Procedure Laterality Date     D & C  1984    after miscarriage-first time     DAVINCI HYSTERECTOMY TOTAL, BILATERAL SALPINGO-OOPHORECTOMY, COMBINED  12/27/2013    Procedure: COMBINED DAVINCI HYSTERECTOMY TOTAL, SALPINGO-OOPHORECTOMY;  Combined Davinci Total Laparoscopic HYSTERECTOMY, Bilateral Salpingectomy, Cystoscopy;  Surgeon: Karolina Eduardo DO;  Location:  OR       FAMILY HISTORY: Father had ureteral cancer (smoker). Mom may have had kidney stones.     SOCIAL HISTORY:    reports that she has never smoked. She has never used smokeless tobacco.    Current Outpatient Prescriptions   Medication  "Sig Dispense Refill     cetirizine-psuedoePHEDrine (ZYRTEC-D) 5-120 MG per 12 hr tablet Take 1 tablet by mouth 2 times daily 180 tablet 3     nystatin-triamcinolone (MYCOLOG) ointment Apply topically 2 times daily 30 g 1     ibuprofen 200 MG capsule Take 200 mg by mouth every 4 hours as needed for fever 120 capsule      diphenhydrAMINE (BENADRYL) 25 MG tablet Take 1-2 tablets by mouth every 6 hours as needed for itching. 60 tablet 1       ALLERGIES: Review of patient's allergies indicates no known allergies.      REVIEW OF SYSTEMS: 14-pt ROS was negative other than that noted in the HPI.    GENERAL PHYSICAL EXAM:   Vitals: LMP 12/05/2013  /78 (BP Location: Right arm, Patient Position: Chair, Cuff Size: Adult Regular)  Pulse 76  Ht 1.753 m (5' 9\")  Wt 96.6 kg (213 lb)  LMP 12/05/2013  BMI 31.45 kg/m2    GENERAL: Well groomed, well developed, well nourished female in NAD.  HEAD: Normocephalic. No masses, lesions, tenderness or abnormalities  NECK: Neck supple. No adenopathy. Thyroid symmetric, normal size  ENT: EOMI  RESPIRATORY: CTAB. No increased respiratory effort.   GI: Soft, NT  MS: Full ROM in extremities, gait normal.  SKIN: Warm to touch, dry.    EXT: No LE edema.  NEURO: Alert and oriented x 3.  PSYCH: Normal mood and affect, pleasant and agreeable during interview and exam.     PVR: Residual urine by ultrasound was 0 ml.           RADIOLOGY: None    LABS: The last test results for Ms. Shaye Brian were reviewed.   BMP -   Recent Labs   Lab Test  03/20/17   1546  06/04/16   0822  07/13/15   1039   NA  141  142  140   POTASSIUM  4.1  4.2  4.0   CHLORIDE  107  110*  106   CO2  30  29  29   BUN  12  15  11   CR  0.68  0.68  0.64   GLC  80  107*  93   OMKAR  9.5  8.9  9.3       CBC -   Recent Labs   Lab Test  06/04/16   0822  07/13/15   1039  02/17/14   1348   WBC  5.1  6.8  6.8   HGB  13.2  14.3  12.5   PLT  262  350  431       Urine - trace blood and leuk est    ASSESSMENT:   Recurrent UTIs    PLAN: "     UA/UC  Mycoplasm, Ureaplasm  CT A/P w/wo contrast  Cysto with first available urologist  Bladder irritant list provided  Hydrate, standing order for UA reflex to cx if symptomatic.  Lifestyle and hygiene modifications were reviewed today in clinic, including wiping front to back, wearing cotton breathable underwear, voiding before and after intercourse to flush the urethra, minimizing baths and opting for showers, and appropriate perineal hygiene. Push fluids to keep the urine dilute    Karo Tenorio PA-C  [facility]    30 min spent with the patient in a face to face manner, >50% of that time spent on counseling and coordination of care.

## 2017-08-11 NOTE — PATIENT INSTRUCTIONS
1. Urinalysis and culture  2. Mycoplasm, Ureaplasm  3. CT A/P w/wo contrast  4. Cystoscopy with first available urologist  5. Hydrate to keep the urine dilute.   6. Standing order for urinalysis, if symptomatic.  7. Lifestyle and hygiene modifications: wiping front to back, wearing cotton breathable underwear, voiding before and after intercourse to flush the urethra, minimizing baths and opting for showers, and appropriate perineal hygiene.     Below is a list of foods that can irritate the bladder:      Caffeinated soft drinks.    Coffee.    Tea.    Chocolate.    Tomato-based foods.    Acidic juices and fruits.    Alcohol.    Carbonated drinks.    Aspartame/Nutrasweet.      Please call [phone] to schedule CT Scan

## 2017-08-11 NOTE — MR AVS SNAPSHOT
After Visit Summary   8/11/2017    Shaye Brian    MRN: [id number]           Patient Information     Date Of Birth          1960        Visit Information        Provider Department      8/11/2017 2:00 PM Karo Tenorio PA-C [facility] [place]        Today's Diagnoses     Dysuria          Care Instructions    1. Urinalysis and culture  2. Mycoplasm, Ureaplasm  3. CT A/P w/wo contrast  4. Cystoscopy with first available urologist  5. Hydrate to keep the urine dilute.   6. Standing order for urinalysis, if symptomatic.  7. Lifestyle and hygiene modifications: wiping front to back, wearing cotton breathable underwear, voiding before and after intercourse to flush the urethra, minimizing baths and opting for showers, and appropriate perineal hygiene.     Below is a list of foods that can irritate the bladder:      Caffeinated soft drinks.    Coffee.    Tea.    Chocolate.    Tomato-based foods.    Acidic juices and fruits.    Alcohol.    Carbonated drinks.    Aspartame/Nutrasweet.      Please call [phone] to schedule CT Scan              Follow-ups after your visit        Your next 10 appointments already scheduled     Aug 22, 2017  3:10 PM CDT   Cystoscopy with Amanda Haines MD,  CYF   [facility] ([facility])    [address]  [address]  [facility] [address] [phone]              Future tests that were ordered for you today     Open Future Orders        Priority Expected Expires Ordered    CT Abdomen Pelvis w/o & w Contrast Routine  8/11/2018 8/11/2017            Who to contact     If you have questions or need follow up information about today's clinic visit or your schedule please contact [facility] directly at [phone].  Normal or non-critical lab and imaging results will be communicated to you by MyChart, letter or phone within 4  "business days after the clinic has received the results. If you do not hear from us within 7 days, please contact the clinic through NativeAD or phone. If you have a critical or abnormal lab result, we will notify you by phone as soon as possible.  Submit refill requests through NativeAD or call your pharmacy and they will forward the refill request to us. Please allow 3 business days for your refill to be completed.          Additional Information About Your Visit        NativeAD Information     NativeAD gives you secure access to your electronic health record. If you see a primary care provider, you can also send messages to your care team and make appointments. If you have questions, please call your primary care clinic.  If you do not have a primary care provider, please call [phone] and they will assist you.        Care EveryWhere ID     This is your Care EveryWhere ID. This could be used by other organizations to access your [place] medical records  [id number]        Your Vitals Were     Pulse Height Last Period BMI (Body Mass Index)          76 1.753 m (5' 9\") 12/05/2013 31.45 kg/m2         Blood Pressure from Last 3 Encounters:   08/11/17 108/78   05/26/17 110/70   04/03/17 112/73    Weight from Last 3 Encounters:   08/11/17 96.6 kg (213 lb)   05/26/17 97.5 kg (215 lb)   03/20/17 98.4 kg (217 lb)              We Performed the Following     MEASURE POST-VOID RESIDUAL URINE/BLADDER CAPACITY, US NON-IMAGING (80816)     Mycoplasma large colony culture     UA without Microscopic [[id number]]     Ureaplasma culture     Urine Culture Aerobic Bacterial     Urine Micro Urologic Phys        Primary Care Provider Office Phone # Fax #    Shecassandra March [phone]       [facility] [address] [facility] [address]        Equal Access to Services     ERIC ZIEGLER : Jolynn castleo Sodagmar, waaxda luqadaha, qaybta kaalmada konrad, zuleyka salguero. So " [facility] [phone].    ATENCIÓN: Si david jacobs, tiene a do disposición servicios gratuitos de asistencia lingüística. Vitor estevez [phone].    We comply with applicable federal civil rights laws and [place] laws. We do not discriminate on the basis of race, color, national origin, age, disability sex, sexual orientation or gender identity.            Thank you!     Thank you for choosing [facility] [place]  for your care. Our goal is always to provide you with excellent care. Hearing back from our patients is one way we can continue to improve our services. Please take a few minutes to complete the written survey that you may receive in the mail after your visit with us. Thank you!             Your Updated Medication List - Protect others around you: Learn how to safely use, store and throw away your medicines at www.disposemymeds.org.          This list is accurate as of: 8/11/17  3:01 PM.  Always use your most recent med list.                   Brand Name Dispense Instructions for use Diagnosis    aspirin-acetaminophen-caffeine 250-250-65 MG per tablet    EXCEDRIN MIGRAINE     Take 1 tablet by mouth        cetirizine-psuedoePHEDrine 5-120 MG per 12 hr tablet    zyrTEC-D    180 tablet    Take 1 tablet by mouth 2 times daily    Seasonal allergic rhinitis, unspecified allergic rhinitis trigger       diphenhydrAMINE 25 MG tablet    BENADRYL    60 tablet    Take 1-2 tablets by mouth every 6 hours as needed for itching.        ibuprofen 200 MG capsule     120 capsule    Take 200 mg by mouth every 4 hours as needed for fever        nystatin-triamcinolone ointment    MYCOLOG    30 g    Apply topically 2 times daily    Intertrigo       TYLENOL PO      Take by mouth every 4 hours as needed for mild pain or fever

## 2017-08-11 NOTE — PROGRESS NOTES
It was my pleasure to meet Ms. Shaye Brian, a 56 year old year old female seen in consultation today at the request of She March NP  for chief complaint of: Urinary Problem (Recurrent UTI's.)      HPI: She is not immunosuppressed.  Today she complains of recent history of frequent urinary tract infections, and states has had 5 in the last 6 months.  These are typically symptomatic.  Typical symptoms include:  Freq, urgency, spasms. She denies gross hematuria, flank pain, fevers, chills.  She has no history of stones.  She has never been hospitalized for UTIs.   With infection, Ms. Shaye Brian typically goes to [facility] where cultures are sometimes performed revealing E. Coli.  Is usually given an antibiotic and reports that symptoms do resolve appropriately with therapy. More recently, she has been treated based on symptoms alone.     -Daily Fluid intake: water    -Bowels: Regular  -Gynecologic Hx:  Hyst    Past Medical History:   Diagnosis Date     Fibroids        Past Surgical History:   Procedure Laterality Date     D & C  1984    after miscarriage-first time     DAVINCI HYSTERECTOMY TOTAL, BILATERAL SALPINGO-OOPHORECTOMY, COMBINED  12/27/2013    Procedure: COMBINED DAVINCI HYSTERECTOMY TOTAL, SALPINGO-OOPHORECTOMY;  Combined Davinci Total Laparoscopic HYSTERECTOMY, Bilateral Salpingectomy, Cystoscopy;  Surgeon: Karolina Eduardo DO;  Location:  OR       FAMILY HISTORY: Father had ureteral cancer (smoker). Mom may have had kidney stones.     SOCIAL HISTORY:    reports that she has never smoked. She has never used smokeless tobacco.    Current Outpatient Prescriptions   Medication Sig Dispense Refill     cetirizine-psuedoePHEDrine (ZYRTEC-D) 5-120 MG per 12 hr tablet Take 1 tablet by mouth 2 times daily 180 tablet 3     nystatin-triamcinolone (MYCOLOG) ointment Apply topically 2 times daily 30 g 1     ibuprofen 200 MG capsule Take 200 mg by mouth every 4 hours as needed for fever 120  "capsule      diphenhydrAMINE (BENADRYL) 25 MG tablet Take 1-2 tablets by mouth every 6 hours as needed for itching. 60 tablet 1       ALLERGIES: Review of patient's allergies indicates no known allergies.      REVIEW OF SYSTEMS: 14-pt ROS was negative other than that noted in the HPI.    GENERAL PHYSICAL EXAM:   Vitals: LMP 12/05/2013  /78 (BP Location: Right arm, Patient Position: Chair, Cuff Size: Adult Regular)  Pulse 76  Ht 1.753 m (5' 9\")  Wt 96.6 kg (213 lb)  LMP 12/05/2013  BMI 31.45 kg/m2    GENERAL: Well groomed, well developed, well nourished female in NAD.  HEAD: Normocephalic. No masses, lesions, tenderness or abnormalities  NECK: Neck supple. No adenopathy. Thyroid symmetric, normal size  ENT: EOMI  RESPIRATORY: CTAB. No increased respiratory effort.   GI: Soft, NT  MS: Full ROM in extremities, gait normal.  SKIN: Warm to touch, dry.    EXT: No LE edema.  NEURO: Alert and oriented x 3.  PSYCH: Normal mood and affect, pleasant and agreeable during interview and exam.     PVR: Residual urine by ultrasound was 0 ml.           RADIOLOGY: None    LABS: The last test results for Ms. Shaye Brian were reviewed.   BMP -   Recent Labs   Lab Test  03/20/17   1546  06/04/16   0822  07/13/15   1039   NA  141  142  140   POTASSIUM  4.1  4.2  4.0   CHLORIDE  107  110*  106   CO2  30  29  29   BUN  12  15  11   CR  0.68  0.68  0.64   GLC  80  107*  93   OMKAR  9.5  8.9  9.3       CBC -   Recent Labs   Lab Test  06/04/16   0822  07/13/15   1039  02/17/14   1348   WBC  5.1  6.8  6.8   HGB  13.2  14.3  12.5   PLT  262  350  431       Urine - trace blood and leuk est    ASSESSMENT:   Recurrent UTIs    PLAN:     UA/UC  Mycoplasm, Ureaplasm  CT A/P w/wo contrast  Cysto with first available urologist  Bladder irritant list provided  Hydrate, standing order for UA reflex to cx if symptomatic.  Lifestyle and hygiene modifications were reviewed today in clinic, including wiping front to back, wearing cotton " breathable underwear, voiding before and after intercourse to flush the urethra, minimizing baths and opting for showers, and appropriate perineal hygiene. Push fluids to keep the urine dilute    Karo Tenorio PA-C  [facility]    30 min spent with the patient in a face to face manner, >50% of that time spent on counseling and coordination of care.

## 2017-08-11 NOTE — LETTER
8/11/2017      RE: Shaye Brian  [address] [facility] [address]       It was my pleasure to meet Ms. Shaye Brian, a 56 year old year old female seen in consultation today at the request of She March NP  for chief complaint of: Urinary Problem (Recurrent UTI's.)      HPI: She is not immunosuppressed.  Today she complains of recent history of frequent urinary tract infections, and states has had 5 in the last 6 months.  These are typically symptomatic.  Typical symptoms include:  Freq, urgency, spasms. She denies gross hematuria, flank pain, fevers, chills.  She has no history of stones.  She has never been hospitalized for UTIs.   With infection, Ms. Shaye Brian typically goes to [facility] where cultures are sometimes performed revealing E. Coli.  Is usually given an antibiotic and reports that symptoms do resolve appropriately with therapy. More recently, she has been treated based on symptoms alone.     -Daily Fluid intake: water    -Bowels: Regular  -Gynecologic Hx:  Hyst    Past Medical History:   Diagnosis Date     Fibroids        Past Surgical History:   Procedure Laterality Date     D & C  1984    after miscarriage-first time     DAVINCI HYSTERECTOMY TOTAL, BILATERAL SALPINGO-OOPHORECTOMY, COMBINED  12/27/2013    Procedure: COMBINED DAVINCI HYSTERECTOMY TOTAL, SALPINGO-OOPHORECTOMY;  Combined Davinci Total Laparoscopic HYSTERECTOMY, Bilateral Salpingectomy, Cystoscopy;  Surgeon: Karolina Eduardo DO;  Location: [facility] OR       FAMILY HISTORY: Father had ureteral cancer (smoker). Mom may have had kidney stones.     SOCIAL HISTORY:    reports that she has never smoked. She has never used smokeless tobacco.    Current Outpatient Prescriptions   Medication Sig Dispense Refill     cetirizine-psuedoePHEDrine (ZYRTEC-D) 5-120 MG per 12 hr tablet Take 1 tablet by mouth 2 times daily 180 tablet 3     nystatin-triamcinolone (MYCOLOG) ointment Apply topically 2 times daily 30 g 1      "ibuprofen 200 MG capsule Take 200 mg by mouth every 4 hours as needed for fever 120 capsule      diphenhydrAMINE (BENADRYL) 25 MG tablet Take 1-2 tablets by mouth every 6 hours as needed for itching. 60 tablet 1       ALLERGIES: Review of patient's allergies indicates no known allergies.      REVIEW OF SYSTEMS: 14-pt ROS was negative other than that noted in the HPI.    GENERAL PHYSICAL EXAM:   Vitals: LMP 12/05/2013  /78 (BP Location: Right arm, Patient Position: Chair, Cuff Size: Adult Regular)  Pulse 76  Ht 1.753 m (5' 9\")  Wt 96.6 kg (213 lb)  LMP 12/05/2013  BMI 31.45 kg/m2    GENERAL: Well groomed, well developed, well nourished female in NAD.  HEAD: Normocephalic. No masses, lesions, tenderness or abnormalities  NECK: Neck supple. No adenopathy. Thyroid symmetric, normal size  ENT: EOMI  RESPIRATORY: CTAB. No increased respiratory effort.   GI: Soft, NT  MS: Full ROM in extremities, gait normal.  SKIN: Warm to touch, dry.    EXT: No LE edema.  NEURO: Alert and oriented x 3.  PSYCH: Normal mood and affect, pleasant and agreeable during interview and exam.     PVR: Residual urine by ultrasound was 0 ml.           RADIOLOGY: None    LABS: The last test results for Ms. Shaye Brian were reviewed.   BMP -   Recent Labs   Lab Test  03/20/17   1546  06/04/16   0822  07/13/15   1039   NA  141  142  140   POTASSIUM  4.1  4.2  4.0   CHLORIDE  107  110*  106   CO2  30  29  29   BUN  12  15  11   CR  0.68  0.68  0.64   GLC  80  107*  93   OMKAR  9.5  8.9  9.3       CBC -   Recent Labs   Lab Test  06/04/16   0822  07/13/15   1039  02/17/14   1348   WBC  5.1  6.8  6.8   HGB  13.2  14.3  12.5   PLT  262  350  431       Urine - trace blood and leuk est    ASSESSMENT:   Recurrent UTIs    PLAN:     UA/UC  Mycoplasm, Ureaplasm  CT A/P w/wo contrast  Cysto with first available urologist  Bladder irritant list provided  Hydrate, standing order for UA reflex to cx if symptomatic.  Lifestyle and hygiene modifications " were reviewed today in clinic, including wiping front to back, wearing cotton breathable underwear, voiding before and after intercourse to flush the urethra, minimizing baths and opting for showers, and appropriate perineal hygiene. Push fluids to keep the urine dilute    Karo Tenorio PA-C  [facility]    30 min spent with the patient in a face to face manner, >50% of that time spent on counseling and coordination of care.       Karo Tenorio PA-C, HEIDI

## 2017-08-12 LAB
BACTERIA SPEC CULT: NO GROWTH
Lab: NORMAL
MICRO REPORT STATUS: NORMAL
SPECIMEN SOURCE: NORMAL

## 2017-08-18 LAB
RESULT: NORMAL
SEND OUTS MISC TEST CODE: NORMAL
SEND OUTS MISC TEST SPECIMEN: NORMAL
TEST NAME: NORMAL

## 2017-08-21 ENCOUNTER — HOSPITAL ENCOUNTER (OUTPATIENT)
Dept: CT IMAGING | Facility: CLINIC | Age: 57
Discharge: HOME | End: 2017-08-21
Attending: PHYSICIAN ASSISTANT | Admitting: PHYSICIAN ASSISTANT
Payer: COMMERCIAL

## 2017-08-21 DIAGNOSIS — R30.0 DYSURIA: ICD-10-CM

## 2017-08-21 PROCEDURE — 25000128 H RX IP 250 OP 636: Performed by: PHYSICIAN ASSISTANT

## 2017-08-21 PROCEDURE — 74178 CT ABD&PLV WO CNTR FLWD CNTR: CPT

## 2017-08-21 RX ORDER — IOPAMIDOL 755 MG/ML
500 INJECTION, SOLUTION INTRAVASCULAR ONCE
Status: COMPLETED | OUTPATIENT
Start: 2017-08-21 | End: 2017-08-21

## 2017-08-21 RX ADMIN — IOPAMIDOL 100 ML: 755 INJECTION, SOLUTION INTRAVENOUS at 11:58

## 2017-08-21 RX ADMIN — SODIUM CHLORIDE 65 ML: 9 INJECTION, SOLUTION INTRAVENOUS at 11:58

## 2017-08-22 ENCOUNTER — OFFICE VISIT (OUTPATIENT)
Dept: UROLOGY | Facility: CLINIC | Age: 57
End: 2017-08-22
Payer: COMMERCIAL

## 2017-08-22 VITALS — WEIGHT: 214 LBS | OXYGEN SATURATION: 95 % | HEIGHT: 69 IN | HEART RATE: 88 BPM | BODY MASS INDEX: 31.7 KG/M2

## 2017-08-22 DIAGNOSIS — N39.0 RECURRENT UTI: ICD-10-CM

## 2017-08-22 DIAGNOSIS — R30.0 DYSURIA: Primary | ICD-10-CM

## 2017-08-22 DIAGNOSIS — K76.9 LIVER LESION: ICD-10-CM

## 2017-08-22 LAB
ALBUMIN UR-MCNC: NEGATIVE MG/DL
APPEARANCE UR: CLEAR
BILIRUB UR QL STRIP: NEGATIVE
COLOR UR AUTO: YELLOW
GLUCOSE UR STRIP-MCNC: NEGATIVE MG/DL
HGB UR QL STRIP: ABNORMAL
KETONES UR STRIP-MCNC: NEGATIVE MG/DL
LEUKOCYTE ESTERASE UR QL STRIP: NEGATIVE
NITRATE UR QL: NEGATIVE
PH UR STRIP: 6 PH (ref 5–7)
SOURCE: ABNORMAL
SP GR UR STRIP: >1.03 (ref 1–1.03)
UROBILINOGEN UR STRIP-ACNC: 0.2 EU/DL (ref 0.2–1)

## 2017-08-22 PROCEDURE — 52000 CYSTOURETHROSCOPY: CPT | Performed by: UROLOGY

## 2017-08-22 PROCEDURE — 81003 URINALYSIS AUTO W/O SCOPE: CPT | Performed by: UROLOGY

## 2017-08-22 PROCEDURE — 99207 ZZC NO CHARGE LOS: CPT | Performed by: UROLOGY

## 2017-08-22 RX ORDER — ESTRADIOL 0.1 MG/G
2 CREAM VAGINAL
Qty: 42.5 G | Refills: 3 | Status: SHIPPED | OUTPATIENT
Start: 2017-08-23 | End: 2017-12-01

## 2017-08-22 ASSESSMENT — PAIN SCALES - GENERAL: PAINLEVEL: NO PAIN (1)

## 2017-08-22 NOTE — NURSING NOTE
Prior to the start of the procedure and with procedural staff participation, I verbally confirmed the patient s identity using two indicators, relevant allergies, that the procedure was appropriate and matched the consent or emergent situation, and that the correct equipment/implants were available. Immediately prior to starting the procedure I conducted the Time Out with the procedural staff and re-confirmed the patient s name, procedure, and site/side. (The Joint Commission universal protocol was followed.)  Yes    Sedation (Moderate or Deep): None  Pt has signed the consent form stating that we will be doing a CYSTOSCOPY (with or without stent removal) today, and that it is the correct procedure. I verbally confirmed the patient s identity using two indicators, relevant allergies, and that the correct equipment was available. Post-op information given to the pt as needed at check-out. I have sent an appropriate antibiotic to the pharmacy in our building as recommended by the MD. MILIND Causey CMA

## 2017-08-22 NOTE — PATIENT INSTRUCTIONS

## 2017-08-22 NOTE — MR AVS SNAPSHOT
"              After Visit Summary   8/22/2017    Shaye Brian    MRN: [id number]           Patient Information     Date Of Birth          1960        Visit Information        Provider Department      8/22/2017 3:10 PM Amanda Haines MD; [facility] [place]        Today's Diagnoses     Dysuria    -  1    Liver lesion          Care Instructions         AFTER YOUR CYSTOSCOPY         You have just completed a cystoscopy, or \"cysto\", which allowed your physician to learn more about your bladder (or to remove a stent placed after surgery). We suggest that you continue to avoid caffeine, fruit juice, and alcohol for the next 24 hours, however, you are encouraged to return to your normal activities.       A few things that are considered normal after your cystoscopy:    * small amount of bleeding (or spotting) that clears within the next 24 hours    * slight burning sensation with urination    * sensation to of needing to avoid more frequently    * the feeling of \"air\" in your urine    * mild discomfort that is relieved with Tylonol        Please contact our office promptly if you:    * develop a fever above 101 degrees    * are unable to urinate    * develop bright red blood that does not stop    * severe pain or swelling        And of course, please contact our office with any concerns or questions [phone]                Follow-ups after your visit        Your next 10 appointments already scheduled     Aug 23, 2017  1:00 PM CDT   MR ABDOMEN W/O & W CONTRAST with [facility]1   [facility] ([facility])    [address] [facility] [place] [id number] [phone]           Take your medicines as usual, unless your doctor tells you not to. Bring a list of your current medicines to your exam (including vitamins, minerals and over-the-counter drugs). Also bring the results of similar scans you may have had.    The day before your exam, drink extra " fluids at least six 8-ounce glasses (unless your doctor tells you to restrict your fluids).   Have a blood test (creatinine test) within 30 days of your exam. Go to your clinic or Diagnostic Imaging Department for this test.   Do not eat or drink for 6 hours prior to exam.  The MRI machine uses a strong magnet. Please wear clothes without metal (snaps, zippers). A sweatsuit works well, or we may give you a hospital gown.  Please remove any body piercings and hair extensions before you arrive. You will also remove watches, jewelry, hairpins, wallets, dentures, partial dental plates and hearing aids. You may wear contact lenses, and you may be able to wear your rings. We have a safe place to keep your personal items, but it is safer to leave them at home.   **IMPORTANT** THE INSTRUCTIONS BELOW ARE ONLY FOR THOSE PATIENTS WHO HAVE BEEN TOLD THEY WILL RECEIVE SEDATION OR GENERAL ANESTHESIA DURING THEIR MRI PROCEDURE:  IF YOU WILL RECEIVE SEDATION (take medicine to help you relax during your exam):   You must get the medicine from your doctor before you arrive. Bring the medicine to the exam. Do not take it at home.   Arrive one hour early. Bring someone who can take you home after the test. Your medicine will make you sleepy. After the exam, you may not drive, take a bus or take a taxi by yourself.   No eating 8 hours before your exam. You may have clear liquids up until 4 hours before your exam. (Clear liquids include water, clear tea, black coffee and fruit juice without pulp.)  IF YOU WILL RECEIVE ANESTHESIA (be asleep for your exam):   Arrive 1 1/2 hours early. Bring someone who can take you home after the test. You may not drive, take a bus or take a taxi by yourself.   No eating 8 hours before your exam. You may have clear liquids up until 4 hours before your exam. (Clear liquids include water, clear tea, black coffee and fruit juice without pulp.)  If you have any questions, please contact your Imaging Department  "exam site.            Sep 05, 2017  1:30 PM CDT   Return Visit with Amanda Haines MD   [facility] ([facility])    [address]  [address]  [facility] [address] [phone]              Future tests that were ordered for you today     Open Future Orders        Priority Expected Expires Ordered    MR Abdomen w/o & w Contrast Routine  8/22/2018 8/22/2017            Who to contact     If you have questions or need follow up information about today's clinic visit or your schedule please contact [facility] directly at [phone].  Normal or non-critical lab and imaging results will be communicated to you by [employer] Gohart, letter or phone within 4 business days after the clinic has received the results. If you do not hear from us within 7 days, please contact the clinic through NanoGramt or phone. If you have a critical or abnormal lab result, we will notify you by phone as soon as possible.  Submit refill requests through [employer] or call your pharmacy and they will forward the refill request to us. Please allow 3 business days for your refill to be completed.          Additional Information About Your Visit        MyChart Information     [employer] gives you secure access to your electronic health record. If you see a primary care provider, you can also send messages to your care team and make appointments. If you have questions, please call your primary care clinic.  If you do not have a primary care provider, please call [phone] and they will assist you.        Care EveryWhere ID     This is your Care EveryWhere ID. This could be used by other organizations to access your [facility] medical records  [id number]        Your Vitals Were     Pulse Height Last Period Pulse Oximetry BMI (Body Mass Index)       88 1.753 m (5' 9\") 12/05/2013 95% 31.6 kg/m2        Blood Pressure from Last 3 Encounters:   08/11/17 108/78 "   05/26/17 110/70   04/03/17 112/73    Weight from Last 3 Encounters:   08/22/17 97.1 kg (214 lb)   08/11/17 96.6 kg (213 lb)   05/26/17 97.5 kg (215 lb)              We Performed the Following     UA without Microscopic          Today's Medication Changes          These changes are accurate as of: 8/22/17  4:08 PM.  If you have any questions, ask your nurse or doctor.               Start taking these medicines.        Dose/Directions    estradiol 0.1 MG/GM cream   Commonly known as:  ESTRACE VAGINAL   Used for:  Dysuria   Started by:  Amanda Haines MD        Dose:  2 g   Start taking on:  8/23/2017   Place 2 g vaginally three times a week   Quantity:  42.5 g   Refills:  3            Where to get your medicines      These medications were sent to [id number] - [place], [place] - [address] [address] [facility] [place]  [address] [facility] [address]     Phone:  [phone]     estradiol 0.1 MG/GM cream                Primary Care Provider Office Phone # Fax #    She March [phone]       [facility] [address] [facility] [address]        Equal Access to Services     ERIC ZIEGLER AH: Hadii ken ku hadasho Soomaali, waaxda luqadaha, qaybta kaalmada adeegyada, zuleyka baldwin hayrosalio salguero. So [facility] [phone].    ATENCIÓN: Si habla español, tiene a do disposición servicios gratuitos de asistencia lingüística. Llame al [phone].    We comply with applicable federal civil rights laws and [place] laws. We do not discriminate on the basis of race, color, national origin, age, disability sex, sexual orientation or gender identity.            Thank you!     Thank you for choosing [facility]  for your care. Our goal is always to provide you with excellent care. Hearing back from our patients is one way we can continue to improve our services. Please take a few minutes to complete the written survey that you may receive  in the mail after your visit with us. Thank you!             Your Updated Medication List - Protect others around you: Learn how to safely use, store and throw away your medicines at www.disposemymeds.org.          This list is accurate as of: 8/22/17  4:08 PM.  Always use your most recent med list.                   Brand Name Dispense Instructions for use Diagnosis    aspirin-acetaminophen-caffeine 250-250-65 MG per tablet    EXCEDRIN MIGRAINE     Take 1 tablet by mouth        cetirizine-psuedoePHEDrine 5-120 MG per 12 hr tablet    zyrTEC-D    180 tablet    Take 1 tablet by mouth 2 times daily    Seasonal allergic rhinitis, unspecified allergic rhinitis trigger       diphenhydrAMINE 25 MG tablet    BENADRYL    60 tablet    Take 1-2 tablets by mouth every 6 hours as needed for itching.        estradiol 0.1 MG/GM cream   Start taking on:  8/23/2017    ESTRACE VAGINAL    42.5 g    Place 2 g vaginally three times a week    Dysuria       ibuprofen 200 MG capsule     120 capsule    Take 200 mg by mouth every 4 hours as needed for fever        nystatin-triamcinolone ointment    MYCOLOG    30 g    Apply topically 2 times daily    Intertrigo       TYLENOL PO      Take by mouth every 4 hours as needed for mild pain or fever

## 2017-08-22 NOTE — LETTER
8/22/2017       RE: Shaye Brian  [address] [facility] [[email or web address] [address]     Dear Colleague,    Thank you for referring your patient, Shaye Brian, to the [facility] [place] [facility]. Please see a copy of my visit note below.      Indication: recurrent UTI     Discussed with patient the alternatives, risks, and procedure. Questions were answered. Informed consent was obtained for cystoscopy.    August 22, 2017 CYSTOSCOPY:  The patient was brought to the procedures room where she was placed in the supine position.  She was prepped and draped in a sterile fashion.  A #16-Angolan flexible cystoscope was introduced into the urinary bladder.  The anterior urethra, membranous urethra, and bladder neck were negative.   Within the urinary bladder, there was no evidence of stones, tumors, or growths.  The ureteral orifices were well visualized bilaterally and found to have clear efflux of urine.   The patient had minimal trabeculation.  On retroflex view, no lesions were seen.    Patient noted to have a cystocele and notes RENEE requiring one ppd.   Rx for topical estrogen for recurrent UTI   Refer to Dr. Mujica to discuss cystocele/RENEE   MRI liver protocol to further evaluate lobulated mass noted CT scan       Again, thank you for allowing me to participate in the care of your patient.      Sincerely,    Amanda Haines MD

## 2017-08-23 ENCOUNTER — HOSPITAL ENCOUNTER (OUTPATIENT)
Dept: MRI IMAGING | Facility: CLINIC | Age: 57
Discharge: HOME | End: 2017-08-23
Attending: UROLOGY | Admitting: UROLOGY
Payer: COMMERCIAL

## 2017-08-23 DIAGNOSIS — K76.9 LIVER LESION: ICD-10-CM

## 2017-08-23 PROCEDURE — 74183 MRI ABD W/O CNTR FLWD CNTR: CPT

## 2017-08-23 PROCEDURE — A9585 GADOBUTROL INJECTION: HCPCS | Performed by: UROLOGY

## 2017-08-23 PROCEDURE — 25000128 H RX IP 250 OP 636: Performed by: UROLOGY

## 2017-08-23 RX ORDER — GADOBUTROL 604.72 MG/ML
10 INJECTION INTRAVENOUS ONCE
Status: COMPLETED | OUTPATIENT
Start: 2017-08-23 | End: 2017-08-23

## 2017-08-23 RX ADMIN — GADOBUTROL 10 ML: 604.72 INJECTION INTRAVENOUS at 12:34

## 2017-08-23 NOTE — PROGRESS NOTES
Indication: recurrent UTI     Discussed with patient the alternatives, risks, and procedure. Questions were answered. Informed consent was obtained for cystoscopy.    August 22, 2017 CYSTOSCOPY:  The patient was brought to the procedures room where she was placed in the supine position.  She was prepped and draped in a sterile fashion.  A #16-Turkish flexible cystoscope was introduced into the urinary bladder.  The anterior urethra, membranous urethra, and bladder neck were negative.   Within the urinary bladder, there was no evidence of stones, tumors, or growths.  The ureteral orifices were well visualized bilaterally and found to have clear efflux of urine.   The patient had minimal trabeculation.  On retroflex view, no lesions were seen.    Patient noted to have a cystocele and notes RENEE requiring one ppd.   Rx for topical estrogen for recurrent UTI   Refer to Dr. Mujica to discuss cystocele/RENEE   MRI liver protocol to further evaluate lobulated mass noted CT scan

## 2017-09-05 ENCOUNTER — OFFICE VISIT (OUTPATIENT)
Dept: UROLOGY | Facility: CLINIC | Age: 57
End: 2017-09-05
Payer: COMMERCIAL

## 2017-09-05 ENCOUNTER — TELEPHONE (OUTPATIENT)
Dept: UROLOGY | Facility: CLINIC | Age: 57
End: 2017-09-05

## 2017-09-05 VITALS — HEIGHT: 69 IN | BODY MASS INDEX: 31.55 KG/M2 | HEART RATE: 84 BPM | OXYGEN SATURATION: 95 % | WEIGHT: 213 LBS

## 2017-09-05 DIAGNOSIS — N39.0 RECURRENT UTI: Primary | ICD-10-CM

## 2017-09-05 PROCEDURE — 99213 OFFICE O/P EST LOW 20 MIN: CPT | Performed by: UROLOGY

## 2017-09-05 ASSESSMENT — PAIN SCALES - GENERAL: PAINLEVEL: NO PAIN (0)

## 2017-09-05 NOTE — MR AVS SNAPSHOT
"              After Visit Summary   9/5/2017    Shaye Brian    MRN: [id number]           Patient Information     Date Of Birth          1960        Visit Information        Provider Department      9/5/2017 1:30 PM Amanda Haines MD [facility]        Today's Diagnoses     Recurrent UTI    -  1       Follow-ups after your visit        Who to contact     If you have questions or need follow up information about today's clinic visit or your schedule please contact [facility] directly at [phone].  Normal or non-critical lab and imaging results will be communicated to you by WallCompasshart, letter or phone within 4 business days after the clinic has received the results. If you do not hear from us within 7 days, please contact the clinic through Moblicot or phone. If you have a critical or abnormal lab result, we will notify you by phone as soon as possible.  Submit refill requests through [employer] or call your pharmacy and they will forward the refill request to us. Please allow 3 business days for your refill to be completed.          Additional Information About Your Visit        MyChart Information     [employer] gives you secure access to your electronic health record. If you see a primary care provider, you can also send messages to your care team and make appointments. If you have questions, please call your primary care clinic.  If you do not have a primary care provider, please call [phone] and they will assist you.        Care EveryWhere ID     This is your Care EveryWhere ID. This could be used by other organizations to access your [facility] medical records  [id number]        Your Vitals Were     Pulse Height Last Period Pulse Oximetry BMI (Body Mass Index)       84 1.753 m (5' 9\") 12/05/2013 95% 31.45 kg/m2        Blood Pressure from Last 3 Encounters:   08/11/17 108/78   05/26/17 110/70   04/03/17 112/73    " Weight from Last 3 Encounters:   09/05/17 96.6 kg (213 lb)   08/22/17 97.1 kg (214 lb)   08/11/17 96.6 kg (213 lb)              Today, you had the following     No orders found for display       Primary Care Provider Office Phone # Fax #    She March [phone]       [facility] [address] [place] [address]        Equal Access to Services     ERIC ZIEGLER : Hadii aad ku hadasho Soomaali, waaxda luqadaha, qaybta kaalmada adeegyada, waxay idiin hayaan adeeg kharash la'aan . So [facility] [phone].    ATENCIÓN: Si habla español, tiene a do disposición servicios gratuitos de asistencia lingüística. [facility] [phone].    We comply with applicable federal civil rights laws and [place] laws. We do not discriminate on the basis of race, color, national origin, age, disability sex, sexual orientation or gender identity.            Thank you!     Thank you for choosing [facility] [place]  for your care. Our goal is always to provide you with excellent care. Hearing back from our patients is one way we can continue to improve our services. Please take a few minutes to complete the written survey that you may receive in the mail after your visit with us. Thank you!             Your Updated Medication List - Protect others around you: Learn how to safely use, store and throw away your medicines at www.disposemymeds.org.          This list is accurate as of: 9/5/17 11:11 PM.  Always use your most recent med list.                   Brand Name Dispense Instructions for use Diagnosis    aspirin-acetaminophen-caffeine 250-250-65 MG per tablet    EXCEDRIN MIGRAINE     Take 1 tablet by mouth        cetirizine-psuedoePHEDrine 5-120 MG per 12 hr tablet    zyrTEC-D    180 tablet    Take 1 tablet by mouth 2 times daily    Seasonal allergic rhinitis, unspecified allergic rhinitis trigger       diphenhydrAMINE 25 MG tablet    BENADRYL    60 tablet    Take 1-2  tablets by mouth every 6 hours as needed for itching.        estradiol 0.1 MG/GM cream    ESTRACE VAGINAL    42.5 g    Place 2 g vaginally three times a week    Dysuria       ibuprofen 200 MG capsule     120 capsule    Take 200 mg by mouth every 4 hours as needed for fever        nystatin-triamcinolone ointment    MYCOLOG    30 g    Apply topically 2 times daily    Intertrigo       TYLENOL PO      Take by mouth every 4 hours as needed for mild pain or fever

## 2017-09-05 NOTE — TELEPHONE ENCOUNTER
"Pt calling to ask if her visit is necessary. Her appointment is to review her MRI results. She reports already seeing her results on [facility]. She would like the results read to her over the phone and does not feel like \"she should have to pay for this\" even though she states that Dr Haines wanted to meet with her. I have recommended that she keep her appointment. I reviewed her chart. The MRI is looking at the liver. I recommended that if she did not want to come in to at least have it reviewed by her PMD. She expresses not wanting to have to pay for that visit either. I recommended she keep her appointment. Angela Waller,HANNA    "

## 2017-09-05 NOTE — LETTER
9/5/2017     RE: Shaye Brian  [address] [address] [address]     Dear Colleague,    Thank you for referring your patient, Shaye Brian, to the [facility] [place] at [facility]. Please see a copy of my visit note below.    UROLOGIC DIAGNOSES:        CURRENT INTERVENTIONS:        History:      Patient initially presented for evaluation and management of recurrent UTI.   Cystoscopy was negative. Noted to have suspicious finding on liver on CT scan.     MRI abd showed hemangioma that corresponded to the suspicious area.     We reviewed the MRI. Also noted that the patient should have some follow up for hemangioma.             Imaging:      Labs:      MEDICATIONS:    Current Outpatient Prescriptions:      aspirin-acetaminophen-caffeine (EXCEDRIN MIGRAINE) 250-250-65 MG per tablet, Take 1 tablet by mouth, Disp: , Rfl:      cetirizine-psuedoePHEDrine (ZYRTEC-D) 5-120 MG per 12 hr tablet, Take 1 tablet by mouth 2 times daily, Disp: 180 tablet, Rfl: 3     nystatin-triamcinolone (MYCOLOG) ointment, Apply topically 2 times daily, Disp: 30 g, Rfl: 1     ibuprofen 200 MG capsule, Take 200 mg by mouth every 4 hours as needed for fever, Disp: 120 capsule, Rfl:      diphenhydrAMINE (BENADRYL) 25 MG tablet, Take 1-2 tablets by mouth every 6 hours as needed for itching., Disp: 60 tablet, Rfl: 1     estradiol (ESTRACE VAGINAL) 0.1 MG/GM cream, Place 2 g vaginally three times a week (Patient not taking: Reported on 9/5/2017), Disp: 42.5 g, Rfl: 3     Acetaminophen (TYLENOL PO), Take by mouth every 4 hours as needed for mild pain or fever, Disp: , Rfl:     ALLERGIES:     Allergies   Allergen Reactions     Iron      Iron infusion; throat sensitivity and redness on her chest       REVIEW OF SYSTEMS: Ten point review of systems without change as outlined in HPI    SURGICAL HISTORY:    Past Surgical History:   Procedure Laterality Date     D & C  1984     "after miscarriage-first time     DAVINCI HYSTERECTOMY TOTAL, BILATERAL SALPINGO-OOPHORECTOMY, COMBINED  12/27/2013    Procedure: COMBINED DAVINCI HYSTERECTOMY TOTAL, SALPINGO-OOPHORECTOMY;  Combined Davinci Total Laparoscopic HYSTERECTOMY, Bilateral Salpingectomy, Cystoscopy;  Surgeon: Karolina Eduardo DO;  Location:  OR         PHYSICAL EXAM:    Pulse 84  Ht 1.753 m (5' 9\")  Wt 96.6 kg (213 lb)  LMP 12/05/2013  SpO2 95%  BMI 31.45 kg/m2    HEENT: Normocephalic and atraumatic    Cardiac: Not done    Back/Flank: Not done    CNS/PNS: Alert and oriented    Respiratory: Normal nonlabored breathing    Abdomen: Soft nontender and nondistended    Peripheral Vascular: No peripheral edema    Mental Status: Normal    External Genitalia: Not done    Bladder: Not done    Urethra: Not done     Vagina: Not done    Cystoscopy:  Not Done      Urinalysis:  UA RESULTS:  Recent Labs   Lab Test  08/22/17   1503  08/11/17   1438   COLOR  Yellow   --    APPEARANCE  Clear   --    URINEGLC  Negative   --    URINEBILI  Negative   --    URINEKETONE  Negative   --    SG  >1.030   --    UBLD  Trace*   --    URINEPH  6.0   --    PROTEIN  Negative   --    UROBILINOGEN  0.2   --    NITRITE  Negative   --    LEUKEST  Negative   --    RBCU   --   <1   WBCU   --   1         IMPRESSION:    55 y/o F with recurrent UTI, liver hemangioma     PLAN:    Refer to Dr. Mujica as noted previously   Follow up with me PRN       Total Time:  10 minutes  Time in Consultation: greater than 50%       Again, thank you for allowing me to participate in the care of your patient.      Sincerely,    Amanda Haines MD      "

## 2017-09-06 NOTE — PROGRESS NOTES
UROLOGIC DIAGNOSES:        CURRENT INTERVENTIONS:        History:      Patient initially presented for evaluation and management of recurrent UTI.   Cystoscopy was negative. Noted to have suspicious finding on liver on CT scan.     MRI abd showed hemangioma that corresponded to the suspicious area.     We reviewed the MRI. Also noted that the patient should have some follow up for hemangioma.             Imaging:      Labs:      MEDICATIONS:    Current Outpatient Prescriptions:      aspirin-acetaminophen-caffeine (EXCEDRIN MIGRAINE) 250-250-65 MG per tablet, Take 1 tablet by mouth, Disp: , Rfl:      cetirizine-psuedoePHEDrine (ZYRTEC-D) 5-120 MG per 12 hr tablet, Take 1 tablet by mouth 2 times daily, Disp: 180 tablet, Rfl: 3     nystatin-triamcinolone (MYCOLOG) ointment, Apply topically 2 times daily, Disp: 30 g, Rfl: 1     ibuprofen 200 MG capsule, Take 200 mg by mouth every 4 hours as needed for fever, Disp: 120 capsule, Rfl:      diphenhydrAMINE (BENADRYL) 25 MG tablet, Take 1-2 tablets by mouth every 6 hours as needed for itching., Disp: 60 tablet, Rfl: 1     estradiol (ESTRACE VAGINAL) 0.1 MG/GM cream, Place 2 g vaginally three times a week (Patient not taking: Reported on 9/5/2017), Disp: 42.5 g, Rfl: 3     Acetaminophen (TYLENOL PO), Take by mouth every 4 hours as needed for mild pain or fever, Disp: , Rfl:     ALLERGIES:     Allergies   Allergen Reactions     Iron      Iron infusion; throat sensitivity and redness on her chest       REVIEW OF SYSTEMS: Ten point review of systems without change as outlined in HPI    SURGICAL HISTORY:    Past Surgical History:   Procedure Laterality Date     D & C  1984    after miscarriage-first time     DAVINCI HYSTERECTOMY TOTAL, BILATERAL SALPINGO-OOPHORECTOMY, COMBINED  12/27/2013    Procedure: COMBINED DAVINCI HYSTERECTOMY TOTAL, SALPINGO-OOPHORECTOMY;  Combined Davinci Total Laparoscopic HYSTERECTOMY, Bilateral Salpingectomy, Cystoscopy;  Surgeon: Karolina Eduardo,  "DO;  Location: [facility] OR         PHYSICAL EXAM:    Pulse 84  Ht 1.753 m (5' 9\")  Wt 96.6 kg (213 lb)  LMP 12/05/2013  SpO2 95%  BMI 31.45 kg/m2    HEENT: Normocephalic and atraumatic    Cardiac: Not done    Back/Flank: Not done    CNS/PNS: Alert and oriented    Respiratory: Normal nonlabored breathing    Abdomen: Soft nontender and nondistended    Peripheral Vascular: No peripheral edema    Mental Status: Normal    External Genitalia: Not done    Bladder: Not done    Urethra: Not done     Vagina: Not done    Cystoscopy:  Not Done      Urinalysis:  UA RESULTS:  Recent Labs   Lab Test  08/22/17   1503  08/11/17   1438   COLOR  Yellow   --    APPEARANCE  Clear   --    URINEGLC  Negative   --    URINEBILI  Negative   --    URINEKETONE  Negative   --    SG  >1.030   --    UBLD  Trace*   --    URINEPH  6.0   --    PROTEIN  Negative   --    UROBILINOGEN  0.2   --    NITRITE  Negative   --    LEUKEST  Negative   --    RBCU   --   <1   WBCU   --   1         IMPRESSION:    55 y/o F with recurrent UTI, liver hemangioma     PLAN:    Refer to Dr. Mujica as noted previously   Follow up with me PRN       Total Time:  10 minutes  Time in Consultation: greater than 50%     "

## 2017-12-01 ENCOUNTER — OFFICE VISIT (OUTPATIENT)
Dept: URGENT CARE | Facility: URGENT CARE | Age: 57
End: 2017-12-01
Payer: COMMERCIAL

## 2017-12-01 VITALS
RESPIRATION RATE: 14 BRPM | TEMPERATURE: 98.5 F | WEIGHT: 213 LBS | HEART RATE: 82 BPM | OXYGEN SATURATION: 96 % | SYSTOLIC BLOOD PRESSURE: 110 MMHG | DIASTOLIC BLOOD PRESSURE: 70 MMHG | BODY MASS INDEX: 31.45 KG/M2

## 2017-12-01 DIAGNOSIS — J06.9 VIRAL URI WITH COUGH: ICD-10-CM

## 2017-12-01 DIAGNOSIS — J02.9 ACUTE PHARYNGITIS, UNSPECIFIED ETIOLOGY: Primary | ICD-10-CM

## 2017-12-01 LAB
DEPRECATED S PYO AG THROAT QL EIA: NORMAL
SPECIMEN SOURCE: NORMAL

## 2017-12-01 PROCEDURE — 99213 OFFICE O/P EST LOW 20 MIN: CPT | Performed by: FAMILY MEDICINE

## 2017-12-01 PROCEDURE — 87880 STREP A ASSAY W/OPTIC: CPT | Performed by: FAMILY MEDICINE

## 2017-12-01 PROCEDURE — 87081 CULTURE SCREEN ONLY: CPT | Performed by: FAMILY MEDICINE

## 2017-12-01 NOTE — MR AVS SNAPSHOT
After Visit Summary   12/1/2017    Shaye Brian    MRN: [id number]           Patient Information     Date Of Birth          1960        Visit Information        Provider Department      12/1/2017 6:45 PM Jeannette Rincon MD [facility]RG[facility]        Today's Diagnoses     Acute pharyngitis, unspecified etiology    -  1    Viral URI with cough           Follow-ups after your visit        Who to contact     If you have questions or need follow up information about today's clinic visit or your schedule please contact [facility] directly at [phone].  Normal or non-critical lab and imaging results will be communicated to you by MyChart, letter or phone within 4 business days after the clinic has received the results. If you do not hear from us within 7 days, please contact the clinic through Candy Labhart or phone. If you have a critical or abnormal lab result, we will notify you by phone as soon as possible.  Submit refill requests through TechniScan or call your pharmacy and they will forward the refill request to us. Please allow 3 business days for your refill to be completed.          Additional Information About Your Visit        MyChart Information     TechniScan gives you secure access to your electronic health record. If you see a primary care provider, you can also send messages to your care team and make appointments. If you have questions, please call your primary care clinic.  If you do not have a primary care provider, please call [phone] and they will assist you.        Care EveryWhere ID     This is your Care EveryWhere ID. This could be used by other organizations to access your [place] medical records  [id number]        Your Vitals Were     Pulse Temperature Respirations Last Period Pulse Oximetry BMI (Body Mass Index)    82 98.5  F (36.9  C) (Oral) 14 12/05/2013 96% 31.45 kg/m2       Blood Pressure from Last 3 Encounters:   12/01/17 110/70    08/11/17 108/78   05/26/17 110/70    Weight from Last 3 Encounters:   12/01/17 213 lb (96.6 kg)   09/05/17 213 lb (96.6 kg)   08/22/17 214 lb (97.1 kg)              We Performed the Following     Beta strep group A culture     Rapid strep screen        Primary Care Provider Office Phone # Fax #    She March, ELICIA [phone]       [facility] [address] [address] [facility] [address]        Equal Access to Services     [facility]: Hadii aad ku hadasho Soomaali, waaxda luqadaha, qaybta kaalmada adeegyada, waxay idiin haylavelln jennifer escobar . So [facility] [phone].    ATENCIÓN: Si habla español, tiene a do disposición servicios gratuitos de asistencia lingüística. Ll[facility] [phone].    We comply with applicable federal civil rights laws and [place] laws. We do not discriminate on the basis of race, color, national origin, age, disability, sex, sexual orientation, or gender identity.            Thank you!     Thank you for choosing [facility]RG[facility]  for your care. Our goal is always to provide you with excellent care. Hearing back from our patients is one way we can continue to improve our services. Please take a few minutes to complete the written survey that you may receive in the mail after your visit with us. Thank you!             Your Updated Medication List - Protect others around you: Learn how to safely use, store and throw away your medicines at www.disposemymeds.org.          This list is accurate as of: 12/1/17  8:13 PM.  Always use your most recent med list.                   Brand Name Dispense Instructions for use Diagnosis    aspirin-acetaminophen-caffeine 250-250-65 MG per tablet    EXCEDRIN MIGRAINE     Take 1 tablet by mouth        TYLENOL PO      Take by mouth every 4 hours as needed for mild pain or fever

## 2017-12-02 LAB
BACTERIA SPEC CULT: NORMAL
SPECIMEN SOURCE: NORMAL

## 2017-12-02 NOTE — NURSING NOTE
"Shaye Brian is a 57 year old female.      Chief Complaint   Patient presents with     Urgent Care     Sinus Problem     pt is here for a cold thqat started a couple days ago and is now having ST, facial pain and pressure - pt wants a strep done       Initial /70 (BP Location: Right arm, Cuff Size: Adult Large)  Pulse 82  Temp 98.5  F (36.9  C) (Oral)  Resp 14  Wt 213 lb (96.6 kg)  LMP 12/05/2013  SpO2 96%  BMI 31.45 kg/m2 Estimated body mass index is 31.45 kg/(m^2) as calculated from the following:    Height as of 9/5/17: 5' 9\" (1.753 m).    Weight as of this encounter: 213 lb (96.6 kg).  Medication Reconciliation: complete      Questioned patient about current smoking habits.  Pt. has never smoked.      Joaquina Vizcaino CMA      "

## 2017-12-02 NOTE — PROGRESS NOTES
SUBJECTIVE:  Chief Complaint   Patient presents with     Urgent Care     Sinus Problem     pt is here for a cold thqat started a couple days ago and is now having ST, facial pain and pressure - pt wants a strep done     Shaye Brian is a 57 year old female   with a chief complaint of sore throat and cough  Onset of symptoms was 4 day(s) ago.    Course of illness: gradual onset, still present and worsening.  Severity moderate  Current and Associated symptoms: cough - non-productive, sore throat, headache, body aches, fatigue and body aches  Treatment measures tried include Tylenol/Ibuprofen, Decongestants, Antihistamine and OTC Cough med.  Predisposing factors include exposure to strep.    Past Medical History:   Diagnosis Date     Fibroids      Mumps        ALLERGIES:  Iron      Current Outpatient Prescriptions on File Prior to Visit:  Acetaminophen (TYLENOL PO) Take by mouth every 4 hours as needed for mild pain or fever   aspirin-acetaminophen-caffeine (EXCEDRIN MIGRAINE) 250-250-65 MG per tablet Take 1 tablet by mouth     No current facility-administered medications on file prior to visit.     Social History   Substance Use Topics     Smoking status: Never Smoker     Smokeless tobacco: Never Used     Alcohol use 0.0 oz/week     0 Standard drinks or equivalent per week      Comment: socially -once every one month        Family History   Problem Relation Age of Onset     Alzheimer Disease Mother      macular degeneration     CANCER Mother      brain tumor     Hypertension Mother      Macular Degeneration Mother      HEART DISEASE Father      CANCER Father      ureter cancer     Respiratory Father      emphysema, smoker     Breast Cancer Other      maternal aunt-using hormones     CANCER Other      dad's sister with ovarian cancer, her daughter had ovarian cancer     Cancer - colorectal No family hx of          ROS:  CONSTITUTIONAL:NEGATIVE for fever, chills, change in weight  INTEGUMENTARY/SKIN: NEGATIVE for  worrisome rashes, moles or lesions  EYES: NEGATIVE for vision changes or irritation  GI: NEGATIVE for nausea, abdominal pain, heartburn, or change in bowel habits       OBJECTIVE:   /70 (BP Location: Right arm, Cuff Size: Adult Large)  Pulse 82  Temp 98.5  F (36.9  C) (Oral)  Resp 14  Wt 213 lb (96.6 kg)  LMP 12/05/2013  SpO2 96%  BMI 31.45 kg/m2     GENERAL APPEARANCE: alert, moderate distress and cooperative   ,EYES: EOMI,  PERRL, conjunctiva clear,HENT: ear canals and TM's normal.  Nose normal.  Pharynx erythematous with some exudate noted.,NECK: supple, non-tender to palpation, no adenopathy noted,RESP: lungs clear to auscultation - no rales, rhonchi or wheezes,CV: regular rates and rhythm, normal S1 S2, no murmer noted, ,SKIN: no suspicious lesions or rashes    Rapid Strep test is negative    ASSESSMENT:  Acute pharyngitis, unspecified etiology     - Rapid strep screen  - Beta strep group A culture    Viral URI with cough      discussed with the patient that a confirmatory strep culture will be performed and that she will be called if the culture is positive for strep.  We discussed other possible causes of pharyngitis including cold viruses      Symptomatic treat with gargles, lozenges, and OTC analgesic as needed. Follow-up with primary clinic if not improving.

## 2017-12-11 ENCOUNTER — OFFICE VISIT (OUTPATIENT)
Dept: URGENT CARE | Facility: URGENT CARE | Age: 57
End: 2017-12-11
Payer: OTHER MISCELLANEOUS

## 2017-12-11 VITALS
BODY MASS INDEX: 31.69 KG/M2 | SYSTOLIC BLOOD PRESSURE: 125 MMHG | TEMPERATURE: 98.3 F | WEIGHT: 214.6 LBS | HEART RATE: 87 BPM | DIASTOLIC BLOOD PRESSURE: 70 MMHG | OXYGEN SATURATION: 98 %

## 2017-12-11 DIAGNOSIS — S05.01XA ABRASION OF RIGHT CORNEA, INITIAL ENCOUNTER: Primary | ICD-10-CM

## 2017-12-11 PROCEDURE — 99213 OFFICE O/P EST LOW 20 MIN: CPT | Performed by: PHYSICIAN ASSISTANT

## 2017-12-11 RX ORDER — ERYTHROMYCIN 5 MG/G
0.5 OINTMENT OPHTHALMIC 3 TIMES DAILY
Qty: 3.5 G | Refills: 0 | Status: SHIPPED | OUTPATIENT
Start: 2017-12-11 | End: 2018-05-26

## 2017-12-11 NOTE — MR AVS SNAPSHOT
After Visit Summary   12/11/2017    Shaye Brian    MRN: [id number]           Patient Information     Date Of Birth          1960        Visit Information        Provider Department      12/11/2017 2:45 PM Keyla Quick PA-C [facility] Urgent [facility]        Today's Diagnoses     Abrasion of right cornea, initial encounter    -  1       Follow-ups after your visit        Who to contact     If you have questions or need follow up information about today's clinic visit or your schedule please contact [facility] URGENT CARE directly at [phone].  Normal or non-critical lab and imaging results will be communicated to you by Money Forwardhart, letter or phone within 4 business days after the clinic has received the results. If you do not hear from us within 7 days, please contact the clinic through Closet or phone. If you have a critical or abnormal lab result, we will notify you by phone as soon as possible.  Submit refill requests through Paltalk or call your pharmacy and they will forward the refill request to us. Please allow 3 business days for your refill to be completed.          Additional Information About Your Visit        MyChart Information     Paltalk gives you secure access to your electronic health record. If you see a primary care provider, you can also send messages to your care team and make appointments. If you have questions, please call your primary care clinic.  If you do not have a primary care provider, please call [phone] and they will assist you.        Care EveryWhere ID     This is your Care EveryWhere ID. This could be used by other organizations to access your [place] medical records  [id number]        Your Vitals Were     Pulse Temperature Last Period Pulse Oximetry BMI (Body Mass Index)       87 98.3  F (36.8  C) (Oral) 12/05/2013 98% 31.69 kg/m2        Blood Pressure from Last 3 Encounters:   12/11/17 125/70   12/01/17 110/70   08/11/17 108/78     Weight from Last 3 Encounters:   12/11/17 214 lb 9.6 oz (97.3 kg)   12/01/17 213 lb (96.6 kg)   09/05/17 213 lb (96.6 kg)              Today, you had the following     No orders found for display         Today's Medication Changes          These changes are accurate as of: 12/11/17  4:59 PM.  If you have any questions, ask your nurse or doctor.               Start taking these medicines.        Dose/Directions    erythromycin ophthalmic ointment   Commonly known as:  ROMYCIN   Used for:  Abrasion of right cornea, initial encounter   Started by:  Keyla Quick PA-C        Dose:  0.5 inch   Place 0.5 inches into the right eye 3 times daily   Quantity:  3.5 g   Refills:  0            Where to get your medicines      These medications were sent to [facility] - [address]   [address]  [address], [place] [address]     Phone:  [phone]     erythromycin ophthalmic ointment                Primary Care Provider Office Phone # Fax #    She March [phone]       [facility] [address] [place] [address]        Equal Access to Services     [facility]: Hadii ken huerta hadasho Soomaali, waaxda luqadaha, qaybta kaalmada adeegyada, zuleyka salguero. So [facility] [phone].    ATENCIÓN: Si habla español, tiene a do disposición servicios gratuitos de asistencia lingüística. Ll[facility]ier [facility] [phone].    We comply with applicable federal civil rights laws and [place] laws. We do not discriminate on the basis of race, color, national origin, age, disability, sex, sexual orientation, or gender identity.            Thank you!     Thank you for choosing [facility] URGENT CARE  for your care. Our goal is always to provide you with excellent care. Hearing back from our patients is one way we can continue to improve our services. Please take a few minutes to complete the written survey that you may receive  in the mail after your visit with us. Thank you!             Your Updated Medication List - Protect others around you: Learn how to safely use, store and throw away your medicines at www.disposemymeds.org.          This list is accurate as of: 12/11/17  4:59 PM.  Always use your most recent med list.                   Brand Name Dispense Instructions for use Diagnosis    aspirin-acetaminophen-caffeine 250-250-65 MG per tablet    EXCEDRIN MIGRAINE     Take 1 tablet by mouth        erythromycin ophthalmic ointment    ROMYCIN    3.5 g    Place 0.5 inches into the right eye 3 times daily    Abrasion of right cornea, initial encounter       TYLENOL PO      Take by mouth every 4 hours as needed for mild pain or fever
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
## 2017-12-18 ENCOUNTER — TELEPHONE (OUTPATIENT)
Dept: FAMILY MEDICINE | Facility: CLINIC | Age: 57
End: 2017-12-18

## 2017-12-18 NOTE — TELEPHONE ENCOUNTER
Panel Management Review      Patient has the following on her problem list: None      Composite cancer screening  Chart review shows that this patient is due/due soon for the following Mammogram  Summary:    Patient is due/failing the following:   MAMMOGRAM    Action needed:   Patient needs referral/order: mammogram    Type of outreach:    Phone, left message for patient to call back.     Questions for provider review:    None                                                                                                                                    HANNA Sanabria       Chart routed to  .

## 2018-01-13 ENCOUNTER — RADIANT APPOINTMENT (OUTPATIENT)
Dept: MAMMOGRAPHY | Facility: CLINIC | Age: 58
End: 2018-01-13
Payer: COMMERCIAL

## 2018-01-13 DIAGNOSIS — Z12.31 VISIT FOR SCREENING MAMMOGRAM: ICD-10-CM

## 2018-01-13 PROCEDURE — 77067 SCR MAMMO BI INCL CAD: CPT | Mod: TC

## 2018-04-07 ENCOUNTER — HEALTH MAINTENANCE LETTER (OUTPATIENT)
Age: 58
End: 2018-04-07

## 2018-05-26 ENCOUNTER — OFFICE VISIT (OUTPATIENT)
Dept: URGENT CARE | Facility: URGENT CARE | Age: 58
End: 2018-05-26
Payer: COMMERCIAL

## 2018-05-26 VITALS
WEIGHT: 212 LBS | SYSTOLIC BLOOD PRESSURE: 108 MMHG | OXYGEN SATURATION: 98 % | BODY MASS INDEX: 31.31 KG/M2 | TEMPERATURE: 97.7 F | HEART RATE: 99 BPM | RESPIRATION RATE: 16 BRPM | DIASTOLIC BLOOD PRESSURE: 78 MMHG

## 2018-05-26 DIAGNOSIS — B97.89 VIRAL SINUSITIS: Primary | ICD-10-CM

## 2018-05-26 DIAGNOSIS — J98.01 ACUTE BRONCHOSPASM: ICD-10-CM

## 2018-05-26 DIAGNOSIS — J32.9 VIRAL SINUSITIS: Primary | ICD-10-CM

## 2018-05-26 PROCEDURE — 99214 OFFICE O/P EST MOD 30 MIN: CPT | Mod: 25 | Performed by: PHYSICIAN ASSISTANT

## 2018-05-26 PROCEDURE — 94640 AIRWAY INHALATION TREATMENT: CPT | Performed by: PHYSICIAN ASSISTANT

## 2018-05-26 RX ORDER — ALBUTEROL SULFATE 90 UG/1
2 AEROSOL, METERED RESPIRATORY (INHALATION) EVERY 6 HOURS PRN
Qty: 1 INHALER | Refills: 0 | Status: SHIPPED | OUTPATIENT
Start: 2018-05-26 | End: 2018-12-07

## 2018-05-26 RX ORDER — BENZONATATE 200 MG/1
200 CAPSULE ORAL 3 TIMES DAILY PRN
Qty: 21 CAPSULE | Refills: 0 | Status: SHIPPED | OUTPATIENT
Start: 2018-05-26 | End: 2018-12-07

## 2018-05-26 RX ORDER — IPRATROPIUM BROMIDE AND ALBUTEROL SULFATE 2.5; .5 MG/3ML; MG/3ML
1 SOLUTION RESPIRATORY (INHALATION) ONCE
Qty: 3 ML | Refills: 0
Start: 2018-05-26 | End: 2019-02-18

## 2018-05-26 NOTE — MR AVS SNAPSHOT
After Visit Summary   5/26/2018    Shaye Brian    MRN: [id number]           Patient Information     Date Of Birth          1960        Visit Information        Provider Department      5/26/2018 9:45 AM Paulo Bay PA-C [facility]        Today's Diagnoses     Viral sinusitis    -  1    Acute bronchospasm          Care Instructions      Bronchospasm (Adult)    Bronchospasm occurs when the airways (bronchial tubes) go into spasm and contract. This makes it hard to breathe and causes wheezing (a high-pitched whistling sound). Bronchospasm can also cause frequent coughing without wheezing.  Bronchospasm is due to irritation, inflammation, or allergic reaction of the airways. People with asthma get bronchospasm. However, not everyone with bronchospasm has asthma.  Being exposed to harmful fumes, a recent case of bronchitis, exercise, or a flare-up of chronic obstructive pulmonary disease (COPD) may cause the airways to spasm. An episode of bronchospasm may last 7 to 14 days. Medicine may be prescribed to relax the airways and prevent wheezing. Antibiotics will be prescribed only if your healthcare provider thinks there is a bacterial infection. Antibiotics do not help a viral infection.  Home care    Drink lots of water or other fluids (at least 10 glasses a day) during an attack. This will loosen lung secretions and make it easier to breathe. If you have heart or kidney disease, check with your doctor before you drink extra fluids.    Take prescribed medicine exactly at the times advised. If you take an inhaled medicine to help with breathing, do not use it more than once every 4 hours, unless told to do so. If prescribed an antibiotic or prednisone, take all of the medicine, even if you are feeling better after a few days.    Do not smoke. Also avoid being exposed to secondhand smoke.    If you were given an inhaler, use it exactly as directed. If you need to  use it more often than prescribed, your condition may be getting worse. Contact your healthcare provider.  Follow-up care  Follow up with your healthcare provider, or as advised.  If you are age 65 or older, have a chronic lung disease or condition that affects your immune system, or you smoke, ask your healthcare provider about getting a pneumococcal vaccine, as well as a yearly flu shot (influenza vaccine).  When to seek medical advice  Call your healthcare provider right away if any of these occur:    You need to use your inhalers more often than usual    Fever of 100.4 F (38 C) or higher, or as directed by your healthcare provider    Cough that brings up lots of dark-colored sputum (mucus)    You don't get better within 24 hours  Call 911  Call 911 if any of these occur:    Coughing up bloody sputum (mucus)    Chest pain with each breath    Increased wheezing or shortness of breath  Date Last Reviewed: 9/13/2015 2000-2017 [employer]. [address] [place] [address]. All rights reserved. This information is not intended as a substitute for professional medical care. Always follow your healthcare professional's instructions.        Sinusitis (No Antibiotics)    The sinuses are air-filled spaces within the bones of the face. They connect to the inside of the nose. Sinusitis is an inflammation of the tissue that lines the sinuses. Sinusitis can occur during a cold. It can also happen due to allergies to pollens and other particles in the air. It can cause symptoms such as sinus congestion, headache, sore throat, facial swelling, and a feeling of fullness. It may also cause a low-grade fever. Your sinusitis does not include an infection with bacteria. Because of this, antibiotics are not used to treat this problem.  Home care    Drink plenty of water, hot tea, and other liquids. This may help thin nasal mucus. It also may help your sinuses drain fluids.    Heat may help soothe painful  areas of your face. Use a towel soaked in hot water. Or,  the shower and direct the warm spray onto your face. Using a vaporizer along with a menthol rub at night may also help soothe symptoms.     An expectorant with guaifenesin may help thin nasal mucus and help your sinuses drain fluids.    You can use an over-the-counter decongestant, unless a similar medicine was prescribed to you. Nasal sprays work the fastest. Use one that contains phenylephrine or oxymetazoline. First blow your nose gently. Then use the spray. Do not use these medicines more often than directed on the label. If you do, your symptoms may get worse. You may also take pills that contain pseudoephedrine. Don t use products that combine multiple medicines. This is because side effects may be increased. Read all medicine labels. You can also ask the pharmacist for help. (People with high blood pressure should not use decongestants. They can raise blood pressure.)    Over-the-counter antihistamines may help if allergies contributed to your sinusitis.      Use acetaminophen or ibuprofen to control pain, unless another pain medicine was prescribed to you. If you have chronic liver or kidney disease or ever had a stomach ulcer, talk with your healthcare provider before using these medicines. (Aspirin should never be taken by anyone under age 18 who is ill with a fever. It may cause severe liver damage.)    Use nasal rinses or irrigation as instructed by your healthcare provider.    Don't smoke. This can make symptoms worse.  Follow-up care  Follow up with your healthcare provider or our staff if you are better in 1 week.  When to seek medical advice  Call your healthcare provider if any of these occur:    Green or yellow fluid draining from your nose or into your throat    Facial pain or headache that gets worse    Stiff neck    Unusual drowsiness or confusion    Swelling of your forehead or eyelids    Vision problems, such as blurred or  double vision    Fever of 100.4 F (38 C) or higher, or as directed by your healthcare provider    Seizure    Breathing problems    Symptoms that don't go away in 10 days  Date Last Reviewed: 11/1/2017 2000-2017 [employer]. [address] [place] [address]. All rights reserved. This information is not intended as a substitute for professional medical care. Always follow your healthcare professional's instructions.                Follow-ups after your visit        Who to contact     If you have questions or need follow up information about today's clinic visit or your schedule please contact [facility] directly at [phone].  Normal or non-critical lab and imaging results will be communicated to you by MyChart, letter or phone within 4 business days after the clinic has received the results. If you do not hear from us within 7 days, please contact the clinic through [employer] or phone. If you have a critical or abnormal lab result, we will notify you by phone as soon as possible.  Submit refill requests through [employer] or call your pharmacy and they will forward the refill request to us. Please allow 3 business days for your refill to be completed.          Additional Information About Your Visit        [employer] Information     [employer] gives you secure access to your electronic health record. If you see a primary care provider, you can also send messages to your care team and make appointments. If you have questions, please call your primary care clinic.  If you do not have a primary care provider, please call [phone] and they will assist you.        Care EveryWhere ID     This is your Care EveryWhere ID. This could be used by other organizations to access your [place] medical records  [id number]        Your Vitals Were     Pulse Temperature Respirations Last Period Pulse Oximetry BMI (Body Mass Index)    99 97.7  F (36.5  C) (Tympanic) 16 12/05/2013 98% 31.31 kg/m2        Blood Pressure from Last 3 Encounters:   05/26/18 108/78   12/11/17 125/70   12/01/17 110/70    Weight from Last 3 Encounters:   05/26/18 212 lb (96.2 kg)   12/11/17 214 lb 9.6 oz (97.3 kg)   12/01/17 213 lb (96.6 kg)              Today, you had the following     No orders found for display         Today's Medication Changes          These changes are accurate as of 5/26/18 10:59 AM.  If you have any questions, ask your nurse or doctor.               Start taking these medicines.        Dose/Directions    albuterol 108 (90 Base) MCG/ACT Inhaler   Commonly known as:  PROAIR HFA/PROVENTIL HFA/VENTOLIN HFA   Used for:  Viral sinusitis, Acute bronchospasm   Started by:  Paulo Bay PA-C        Dose:  2 puff   Inhale 2 puffs into the lungs every 6 hours as needed for shortness of breath / dyspnea or wheezing   Quantity:  1 Inhaler   Refills:  0       benzonatate 200 MG capsule   Commonly known as:  TESSALON   Used for:  Viral sinusitis, Acute bronchospasm   Started by:  Paulo Bay PA-C        Dose:  200 mg   Take 1 capsule (200 mg) by mouth 3 times daily as needed for cough   Quantity:  21 capsule   Refills:  0       ipratropium - albuterol 0.5 mg/2.5 mg/3 mL 0.5-2.5 (3) MG/3ML neb solution   Commonly known as:  DUONEB   Used for:  Acute bronchospasm   Started by:  Paulo Bay PA-C        Dose:  1 vial   Take 1 vial (3 mLs) by nebulization once for 1 dose   Quantity:  3 mL   Refills:  0            Where to get your medicines      These medications were sent to [facility] - [address]   [address] [place] [address] [address]     Phone:  [phone]     albuterol 108 (90 Base) MCG/ACT Inhaler    benzonatate 200 MG capsule         Some of these will need a paper prescription and others can be bought over the counter.  Ask your nurse if you have questions.     You don't need a prescription for  these medications     ipratropium - albuterol 0.5 mg/2.5 mg/3 mL 0.5-2.5 (3) MG/3ML neb solution                Primary Care Provider Office Phone # Fax #    She March [phone]       [facility] [address] [address] [address]        Equal Access to Services     [facility] : Hadii aad ku hadasho Soomaali, waaxda luqadaha, qaybta kaalmada adeegyada, waxay idiin hayaan adeeg kharash la'aan ah. So [facility] [phone].    ATENCIÓN: Si habla español, tiene a do disposición servicios gratuitos de asistencia lingüística. [facility] [phone].    We comply with applicable federal civil rights laws and [place] laws. We do not discriminate on the basis of race, color, national origin, age, disability, sex, sexual orientation, or gender identity.            Thank you!     Thank you for choosing [facility]  for your care. Our goal is always to provide you with excellent care. Hearing back from our patients is one way we can continue to improve our services. Please take a few minutes to complete the written survey that you may receive in the mail after your visit with us. Thank you!             Your Updated Medication List - Protect others around you: Learn how to safely use, store and throw away your medicines at www.disposemymeds.org.          This list is accurate as of 5/26/18 10:59 AM.  Always use your most recent med list.                   Brand Name Dispense Instructions for use Diagnosis    albuterol 108 (90 Base) MCG/ACT Inhaler    PROAIR HFA/PROVENTIL HFA/VENTOLIN HFA    1 Inhaler    Inhale 2 puffs into the lungs every 6 hours as needed for shortness of breath / dyspnea or wheezing    Viral sinusitis, Acute bronchospasm       aspirin-acetaminophen-caffeine 250-250-65 MG per tablet    EXCEDRIN MIGRAINE     Take 1 tablet by mouth        benzonatate 200 MG capsule    TESSALON    21 capsule    Take 1 capsule (200 mg) by mouth 3 times daily as needed for cough     Viral sinusitis, Acute bronchospasm       ipratropium - albuterol 0.5 mg/2.5 mg/3 mL 0.5-2.5 (3) MG/3ML neb solution    DUONEB    3 mL    Take 1 vial (3 mLs) by nebulization once for 1 dose    Acute bronchospasm       TYLENOL PO      Take by mouth every 4 hours as needed for mild pain or fever

## 2018-05-26 NOTE — PROGRESS NOTES
SUBJECTIVE:   Shaye Brian presents to clinic today for evaluation of nasal congestion,  rhinorrhea, post-nasal drainage, sinus pain/pressure, waxing and waning generalized HA, cough (mostly dry but sometimes productive) and intermittent wheezing x 3 days duration.     ROS:     HEENT: Positive nasal and sinus congestion.  Admits to spring allergies prior to onset of above  RESP: Positive cough and wheezing as per above. Despite cough, denies any severe SOB.  Denies any hx of asthma or RAD.   GI: Denies any N/V/D. No abdominal pain. Normal BM's  SKIN: Denies rash  NEURO:Positive mild, waxing and waning generalized sinus HA as per above. Denies any severe headaches, neck stiffness, photophobia, rash, mental status changes or lethargy.     Past Medical History:   Diagnosis Date     Fibroids      Mumps        Patient Active Problem List   Diagnosis     Hyperlipidemia LDL goal <160     Insomnia     Snoring     Menorrhagia     Anemia     Uterine fibroid     Thickened endometrium     S/P hysterectomy     Current Outpatient Prescriptions   Medication     Acetaminophen (TYLENOL PO)     aspirin-acetaminophen-caffeine (EXCEDRIN MIGRAINE) 250-250-65 MG per tablet     No current facility-administered medications for this visit.          Allergies   Allergen Reactions     Iron      Iron infusion; throat sensitivity and redness on her chest           OBJECTIVE:   /78  Pulse 99  Temp 97.7  F (36.5  C) (Tympanic)  Resp 16  Wt 212 lb (96.2 kg)  LMP 12/05/2013  SpO2 98%  BMI 31.31 kg/m2        General appearance: alert and no apparent distress   Skin color is uniform in color and without rash.   HEENT:   Conjunctiva not injected. Sclera clear.   Left TM is normal: no effusions, no erythema, and normal landmarks.   Right TM is normal: no effusions, no erythema, and normal landmarks.   Nasal mucosa is red and swollen. Maxillary sinuses are mildly tender to percussion bilaterally.   Oropharyngeal exam is normal  "other than evidence of PND: no lesions, erythema, adenopathy or exudate. Neck is supple with no adenopathy   CARDIAC:NORMAL - regular rate and rhythm without murmur.   RESP:  No labored breathing. No nasal flaring or retractions. No stridor.   Pre-Neb Auscultation: No wheezing with normal tidal volume breathing. Positive for Scattered wheezing with forced expiration and coughing.  No rales or rhonchi  Post-Neb Auscultation: Improved air movement. No wheezing with tidal volume breathing or with forced expiration. No rales or rhonchi.   EXTREMITIES:  Free of edema  NEURO: Alert and oriented.  Normal speech and mentation.  CN II/XII grossly intact.  Gait within normal limits.      ASSESSMENT/PLAN:    (J32.9,  B97.89) Viral sinusitis  (primary encounter diagnosis)  MDM: Viral URI/Viral sinusitis and seasonal allergic rhinitis  with secondary associated bronchospasms. No evidence of pneumonia or other secondary bacterial infections here today. Excellent subjective and objective response to single Duoneb given here today.     Plan: albuterol (PROAIR HFA/PROVENTIL HFA/VENTOLIN         HFA) 108 (90 Base) MCG/ACT Inhaler, benzonatate        (TESSALON) 200 MG capsule,         ALBUTEROL/IPRATROPIUM 3ML NEB - .001,         INHALATION/NEBULIZER TREATMENT, INITIAL    1. Albuterol MDI prn   2. Tessalon Perles prn   3. Home comfort care measures   4. Follow-up with PCP if sxs change, worsen or fail to fully resolve with above tx.  5.  In addition to the above, sinusitis and bronchospasm  \"red flag\" signs and sxs are reviewed with pt both verbally and by way of printed educational material for home review.  Pt verbalizes understanding of and agrees to the above plan.       (J98.01) Acute bronchospasm  Plan: ipratropium - albuterol 0.5 mg/2.5 mg/3 mL         (DUONEB) 0.5-2.5 (3) MG/3ML neb solution,         albuterol (PROAIR HFA/PROVENTIL HFA/VENTOLIN         HFA) 108 (90 Base) MCG/ACT Inhaler, benzonatate        (TESSALON) 200 " MG capsule,         ALBUTEROL/IPRATROPIUM 3ML NEB - .001,         INHALATION/NEBULIZER TREATMENT, INITIAL  As per above

## 2018-05-26 NOTE — PATIENT INSTRUCTIONS
Bronchospasm (Adult)    Bronchospasm occurs when the airways (bronchial tubes) go into spasm and contract. This makes it hard to breathe and causes wheezing (a high-pitched whistling sound). Bronchospasm can also cause frequent coughing without wheezing.  Bronchospasm is due to irritation, inflammation, or allergic reaction of the airways. People with asthma get bronchospasm. However, not everyone with bronchospasm has asthma.  Being exposed to harmful fumes, a recent case of bronchitis, exercise, or a flare-up of chronic obstructive pulmonary disease (COPD) may cause the airways to spasm. An episode of bronchospasm may last 7 to 14 days. Medicine may be prescribed to relax the airways and prevent wheezing. Antibiotics will be prescribed only if your healthcare provider thinks there is a bacterial infection. Antibiotics do not help a viral infection.  Home care    Drink lots of water or other fluids (at least 10 glasses a day) during an attack. This will loosen lung secretions and make it easier to breathe. If you have heart or kidney disease, check with your doctor before you drink extra fluids.    Take prescribed medicine exactly at the times advised. If you take an inhaled medicine to help with breathing, do not use it more than once every 4 hours, unless told to do so. If prescribed an antibiotic or prednisone, take all of the medicine, even if you are feeling better after a few days.    Do not smoke. Also avoid being exposed to secondhand smoke.    If you were given an inhaler, use it exactly as directed. If you need to use it more often than prescribed, your condition may be getting worse. Contact your healthcare provider.  Follow-up care  Follow up with your healthcare provider, or as advised.  If you are age 65 or older, have a chronic lung disease or condition that affects your immune system, or you smoke, ask your healthcare provider about getting a pneumococcal vaccine, as well as a yearly flu shot  (influenza vaccine).  When to seek medical advice  Call your healthcare provider right away if any of these occur:    You need to use your inhalers more often than usual    Fever of 100.4 F (38 C) or higher, or as directed by your healthcare provider    Cough that brings up lots of dark-colored sputum (mucus)    You don't get better within 24 hours  Call 911  Call 911 if any of these occur:    Coughing up bloody sputum (mucus)    Chest pain with each breath    Increased wheezing or shortness of breath  Date Last Reviewed: 9/13/2015 2000-2017 [employer]. [address] [place] [address]. All rights reserved. This information is not intended as a substitute for professional medical care. Always follow your healthcare professional's instructions.        Sinusitis (No Antibiotics)    The sinuses are air-filled spaces within the bones of the face. They connect to the inside of the nose. Sinusitis is an inflammation of the tissue that lines the sinuses. Sinusitis can occur during a cold. It can also happen due to allergies to pollens and other particles in the air. It can cause symptoms such as sinus congestion, headache, sore throat, facial swelling, and a feeling of fullness. It may also cause a low-grade fever. Your sinusitis does not include an infection with bacteria. Because of this, antibiotics are not used to treat this problem.  Home care    Drink plenty of water, hot tea, and other liquids. This may help thin nasal mucus. It also may help your sinuses drain fluids.    Heat may help soothe painful areas of your face. Use a towel soaked in hot water. Or,  the shower and direct the warm spray onto your face. Using a vaporizer along with a menthol rub at night may also help soothe symptoms.     An expectorant with guaifenesin may help thin nasal mucus and help your sinuses drain fluids.    You can use an over-the-counter decongestant, unless a similar medicine was prescribed to  you. Nasal sprays work the fastest. Use one that contains phenylephrine or oxymetazoline. First blow your nose gently. Then use the spray. Do not use these medicines more often than directed on the label. If you do, your symptoms may get worse. You may also take pills that contain pseudoephedrine. Don t use products that combine multiple medicines. This is because side effects may be increased. Read all medicine labels. You can also ask the pharmacist for help. (People with high blood pressure should not use decongestants. They can raise blood pressure.)    Over-the-counter antihistamines may help if allergies contributed to your sinusitis.      Use acetaminophen or ibuprofen to control pain, unless another pain medicine was prescribed to you. If you have chronic liver or kidney disease or ever had a stomach ulcer, talk with your healthcare provider before using these medicines. (Aspirin should never be taken by anyone under age 18 who is ill with a fever. It may cause severe liver damage.)    Use nasal rinses or irrigation as instructed by your healthcare provider.    Don't smoke. This can make symptoms worse.  Follow-up care  Follow up with your healthcare provider or our staff if you are better in 1 week.  When to seek medical advice  Call your healthcare provider if any of these occur:    Green or yellow fluid draining from your nose or into your throat    Facial pain or headache that gets worse    Stiff neck    Unusual drowsiness or confusion    Swelling of your forehead or eyelids    Vision problems, such as blurred or double vision    Fever of 100.4 F (38 C) or higher, or as directed by your healthcare provider    Seizure    Breathing problems    Symptoms that don't go away in 10 days  Date Last Reviewed: 11/1/2017 2000-2017 [employer]. [address] [place] [address], [place] [address]. All rights reserved. This information is not intended as a substitute for professional medical care. Always follow  your healthcare professional's instructions.

## 2018-10-04 ENCOUNTER — TRANSFERRED RECORDS (OUTPATIENT)
Dept: HEALTH INFORMATION MANAGEMENT | Facility: CLINIC | Age: 58
End: 2018-10-04

## 2018-12-07 ENCOUNTER — OFFICE VISIT (OUTPATIENT)
Dept: FAMILY MEDICINE | Facility: CLINIC | Age: 58
End: 2018-12-07
Payer: COMMERCIAL

## 2018-12-07 VITALS
TEMPERATURE: 97.4 F | BODY MASS INDEX: 33.27 KG/M2 | WEIGHT: 212 LBS | HEIGHT: 67 IN | DIASTOLIC BLOOD PRESSURE: 71 MMHG | SYSTOLIC BLOOD PRESSURE: 105 MMHG | HEART RATE: 81 BPM

## 2018-12-07 DIAGNOSIS — Z00.00 ROUTINE GENERAL MEDICAL EXAMINATION AT A HEALTH CARE FACILITY: Primary | ICD-10-CM

## 2018-12-07 DIAGNOSIS — R73.03 PREDIABETES: ICD-10-CM

## 2018-12-07 PROBLEM — Z90.79 S/P TAH-BSO: Status: ACTIVE | Noted: 2018-12-07

## 2018-12-07 PROBLEM — Z90.722 S/P TAH-BSO: Status: ACTIVE | Noted: 2018-12-07

## 2018-12-07 LAB
CHOLEST SERPL-MCNC: 222 MG/DL
HBA1C MFR BLD: 6.3 % (ref 0–5.6)
HDLC SERPL-MCNC: 61 MG/DL
LDLC SERPL CALC-MCNC: 142 MG/DL
NONHDLC SERPL-MCNC: 161 MG/DL
TRIGL SERPL-MCNC: 93 MG/DL

## 2018-12-07 PROCEDURE — 80061 LIPID PANEL: CPT | Performed by: FAMILY MEDICINE

## 2018-12-07 PROCEDURE — 83036 HEMOGLOBIN GLYCOSYLATED A1C: CPT | Performed by: FAMILY MEDICINE

## 2018-12-07 PROCEDURE — 99396 PREV VISIT EST AGE 40-64: CPT | Performed by: FAMILY MEDICINE

## 2018-12-07 ASSESSMENT — ENCOUNTER SYMPTOMS
CONSTIPATION: 0
ABDOMINAL PAIN: 0
HEMATOCHEZIA: 0
DIARRHEA: 0
HEMATURIA: 0
CHILLS: 0
DIZZINESS: 0
EYE PAIN: 0
COUGH: 1

## 2018-12-07 NOTE — MR AVS SNAPSHOT
After Visit Summary   12/7/2018    Shaye Brian    MRN: [id number]           Patient Information     Date Of Birth          1960        Visit Information        Provider Department      12/7/2018 7:40 AM Keyla Reed MD [facility]        Today's Diagnoses     Routine general medical examination at a health care facility    -  1    Prediabetes          Care Instructions    [employer] --- check with insurance      Preventive Health Recommendations  Female Ages 50 - 64    Yearly exam: See your health care provider every year in order to  o Review health changes.   o Discuss preventive care.    o Review your medicines if your doctor has prescribed any.      Get a Pap test every three years (unless you have an abnormal result and your provider advises testing more often).    If you get Pap tests with HPV test, you only need to test every 5 years, unless you have an abnormal result.     You do not need a Pap test if your uterus was removed (hysterectomy) and you have not had cancer.    You should be tested each year for STDs (sexually transmitted diseases) if you're at risk.     Have a mammogram every 1 to 2 years.    Have a colonoscopy at age 50, or have a yearly FIT test (stool test). These exams screen for colon cancer.      Have a cholesterol test every 5 years, or more often if advised.    Have a diabetes test (fasting glucose) every three years. If you are at risk for diabetes, you should have this test more often.     If you are at risk for osteoporosis (brittle bone disease), think about having a bone density scan (DEXA).    Shots: Get a flu shot each year. Get a tetanus shot every 10 years.    Nutrition:     Eat at least 5 servings of fruits and vegetables each day.    Eat whole-grain bread, whole-wheat pasta and brown rice instead of white grains and rice.    Get adequate Calcium and Vitamin D.     Lifestyle    Exercise at least 150 minutes a week (30 minutes a day, 5  days a week). This will help you control your weight and prevent disease.    Limit alcohol to one drink per day.    No smoking.     Wear sunscreen to prevent skin cancer.     See your dentist every six months for an exam and cleaning.    See your eye doctor every 1 to 2 years.            Follow-ups after your visit        Follow-up notes from your care team     Return in about 1 year (around 12/7/2019) for Yearly Physical Exam.      Future tests that were ordered for you today     Open Future Orders        Priority Expected Expires Ordered    MA Screen Bilateral w/Nile Routine  12/7/2019 12/7/2018            Who to contact     If you have questions or need follow up information about today's clinic visit or your schedule please contact [facility] directly at [phone].  Normal or non-critical lab and imaging results will be communicated to you by testbirdshart, letter or phone within 4 business days after the clinic has received the results. If you do not hear from us within 7 days, please contact the clinic through testbirdshart or phone. If you have a critical or abnormal lab result, we will notify you by phone as soon as possible.  Submit refill requests through [employer] or call your pharmacy and they will forward the refill request to us. Please allow 3 business days for your refill to be completed.          Additional Information About Your Visit        testbirdshart Information     [employer] gives you secure access to your electronic health record. If you see a primary care provider, you can also send messages to your care team and make appointments. If you have questions, please call your primary care clinic.  If you do not have a primary care provider, please call [phone] and they will assist you.        Care EveryWhere ID     This is your Care EveryWhere ID. This could be used by other organizations to access your [place] medical records  [id number]        Your Vitals Were     Pulse Temperature Height  "Last Period Breastfeeding? BMI (Body Mass Index)    81 97.4  F (36.3  C) (Oral) 5' 7.25\" (1.708 m) 12/05/2013 No 32.96 kg/m2       Blood Pressure from Last 3 Encounters:   12/07/18 105/71   05/26/18 108/78   12/11/17 125/70    Weight from Last 3 Encounters:   12/07/18 212 lb (96.2 kg)   05/26/18 212 lb (96.2 kg)   12/11/17 214 lb 9.6 oz (97.3 kg)              We Performed the Following     Lipid panel reflex to direct LDL Fasting          Today's Medication Changes          These changes are accurate as of 12/7/18  8:36 AM.  If you have any questions, ask your nurse or doctor.               Stop taking these medicines if you haven't already. Please contact your care team if you have questions.     albuterol 108 (90 Base) MCG/ACT inhaler   Commonly known as:  PROAIR HFA/PROVENTIL HFA/VENTOLIN HFA   Stopped by:  Keyla Reed MD           benzonatate 200 MG capsule   Commonly known as:  TESSALON   Stopped by:  Keyla Reed MD                    Primary Care Provider Office Phone # Fax #    Keyla Reed [phone] [address] [facility] [address]        Equal Access to Services     ERIC ZIEGLER AH: Hadii ken ku hadasho Soomaali, waaxda luqadaha, qaybta kaalmada adeegyada, zuleyka salguero. So [facility] [phone].    ATENCIÓN: Si habla español, tiene a do disposición servicios gratuitos de asistencia lingüística. Llame al [phone].    We comply with applicable federal civil rights laws and [place] laws. We do not discriminate on the basis of race, color, national origin, age, disability, sex, sexual orientation, or gender identity.            Thank you!     Thank you for choosing [facility]  for your care. Our goal is always to provide you with excellent care. Hearing back from our patients is one way we can continue to improve our services. Please take a few minutes to complete the written survey that you may receive in the " mail after your visit with us. Thank you!             Your Updated Medication List - Protect others around you: Learn how to safely use, store and throw away your medicines at www.disposemymeds.org.          This list is accurate as of 12/7/18  8:36 AM.  Always use your most recent med list.                   Brand Name Dispense Instructions for use Diagnosis    aspirin-acetaminophen-caffeine 250-250-65 MG tablet    EXCEDRIN MIGRAINE     Take 1 tablet by mouth        ipratropium - albuterol 0.5 mg/2.5 mg/3 mL 0.5-2.5 (3) MG/3ML neb solution    DUONEB    3 mL    Take 1 vial (3 mLs) by nebulization once for 1 dose    Acute bronchospasm       TYLENOL PO      Take by mouth every 4 hours as needed for mild pain or fever

## 2018-12-07 NOTE — PROGRESS NOTES
SUBJECTIVE:   CC: Shaye Brian is an 58 year old woman who presents for preventive health visit.     Physical   Annual:     Getting at least 3 servings of Calcium per day:  Yes    Bi-annual eye exam:  Yes    Dental care twice a year:  Yes    Sleep apnea or symptoms of sleep apnea:  None    Diet:  Regular (no restrictions)    Frequency of exercise:  None    Taking medications regularly:  Yes    Medication side effects:  None    Additional concerns today:  No    PHQ-2 Total Score: 0      1. Lesion on the left foot - present for years, can go flat at times then seems to fill back up. Not painful. No drainage. Sees derm once yearly for skin exams.     2. Would like to lose weight - did not have success with the keto diet, not tracking food, not exercising.       Health maintenance- pt to do flu shot at [employer]      Today's PHQ-2 Score:   PHQ-2 ( 1999 Pfizer) 12/7/2018   Q1: Little interest or pleasure in doing things 0   Q2: Feeling down, depressed or hopeless 0   PHQ-2 Score 0   Q1: Little interest or pleasure in doing things Not at all   Q2: Feeling down, depressed or hopeless Not at all   PHQ-2 Score 0       Abuse: Current or Past(Physical, Sexual or Emotional)- No  Do you feel safe in your environment? Yes    Social History   Substance Use Topics     Smoking status: Never Smoker     Smokeless tobacco: Never Used     Alcohol use 0.0 oz/week     0 Standard drinks or equivalent per week      Comment: socially -once every one month      Alcohol Use 12/7/2018   If you drink alcohol do you typically have greater than 3 drinks per day OR greater than 7 drinks per week? No       Reviewed orders with patient.  Reviewed health maintenance and updated orders accordingly - Yes  Patient Active Problem List   Diagnosis     Hyperlipidemia LDL goal <160     Status post total abdominal hysterectomy     Prediabetes     Past Surgical History:   Procedure Laterality Date     D & C  1984    after miscarriage-first time     DOUGLAS  HYSTERECTOMY TOTAL, BILATERAL SALPINGO-OOPHORECTOMY, COMBINED  12/27/2013    Procedure: COMBINED DAVINCI HYSTERECTOMY TOTAL, SALPINGO-OOPHORECTOMY;  Combined Davinci Total Laparoscopic HYSTERECTOMY, Bilateral Salpingectomy, Cystoscopy;  Surgeon: Karolina Eduardo DO;  Location: [facility] OR       Social History   Substance Use Topics     Smoking status: Never Smoker     Smokeless tobacco: Never Used     Alcohol use 0.0 oz/week     0 Standard drinks or equivalent per week      Comment: socially -once every one month      Family History   Problem Relation Age of Onset     Alzheimer Disease Mother      macular degeneration     Cancer Mother      brain tumor     Hypertension Mother      Macular Degeneration Mother      Heart Disease Father      Cancer Father      ureter cancer     Respiratory Father      emphysema, smoker     Breast Cancer Other      maternal aunt-using hormones     Cancer Other      dad's sister with ovarian cancer, her daughter had ovarian cancer     Cancer - colorectal No family hx of            Mammogram Screening: Patient over age 50, mutual decision to screen reflected in health maintenance.    Pertinent mammograms are reviewed under the imaging tab.  History of abnormal Pap smear: Status post benign hysterectomy. Health Maintenance and Surgical History updated.  PAP / HPV 5/6/2013   PAP NIL     Reviewed and updated as needed this visit by clinical staff  Tobacco  Allergies  Meds  Problems  Med Hx  Surg Hx  Fam Hx  Soc Hx          Reviewed and updated as needed this visit by Provider  Allergies  Meds  Problems            Review of Systems  CONSTITUTIONAL: NEGATIVE for fever, chills, change in weight  INTEGUMENTARY/SKIN: NEGATIVE for worrisome rashes, moles or lesions  EYES: NEGATIVE for vision changes or irritation  ENT: NEGATIVE for ear, mouth and throat problems  RESP: NEGATIVE for significant cough or SOB  BREAST: NEGATIVE for masses, tenderness or discharge  CV: NEGATIVE for chest pain,  "palpitations or peripheral edema  GI: NEGATIVE for nausea, abdominal pain, heartburn, or change in bowel habits  : NEGATIVE for unusual urinary or vaginal symptoms. No vaginal bleeding.  MUSCULOSKELETAL: NEGATIVE for significant arthralgias or myalgia  NEURO: NEGATIVE for weakness, dizziness or paresthesias  PSYCHIATRIC: NEGATIVE for changes in mood or affect      OBJECTIVE:   /71 (BP Location: Right arm, Patient Position: Chair, Cuff Size: Adult Large)  Pulse 81  Temp 97.4  F (36.3  C) (Oral)  Ht 5' 7.25\" (1.708 m)  Wt 212 lb (96.2 kg)  LMP 12/05/2013  Breastfeeding? No  BMI 32.96 kg/m2  Physical Exam  GENERAL APPEARANCE: healthy, alert and no distress  EYES: Eyes grossly normal to inspection, PERRL and conjunctivae and sclerae normal  HENT: ear canals and TM's normal, nose and mouth without ulcers or lesions, oropharynx clear and oral mucous membranes moist  NECK: no adenopathy, no asymmetry, masses, or scars and thyroid normal to palpation  RESP: lungs clear to auscultation - no rales, rhonchi or wheezes  BREAST: normal without masses, tenderness or nipple discharge and no palpable axillary masses or adenopathy  CV: regular rate and rhythm, normal S1 S2, no S3 or S4, no murmur, click or rub, no peripheral edema and peripheral pulses strong  ABDOMEN: soft, nontender, no hepatosplenomegaly, no masses and bowel sounds normal  MS: no musculoskeletal defects are noted and gait is age appropriate without ataxia  SKIN: no suspicious lesions or rashes  NEURO: Normal strength and tone, sensory exam grossly normal, mentation intact and speech normal  PSYCH: mentation appears normal and affect normal/bright    Diagnostic Test Results:  none     ASSESSMENT/PLAN:     1. Routine general medical examination at a health care facility  - Lipid panel reflex to direct LDL Fasting  - MA Screen Bilateral w/Nile; Future    2. Prediabetes - will recheck A1c today      COUNSELING:  Reviewed preventive health counseling, " "as reflected in patient instructions    BP Readings from Last 1 Encounters:   12/07/18 105/71     Estimated body mass index is 32.96 kg/(m^2) as calculated from the following:    Height as of this encounter: 5' 7.25\" (1.708 m).    Weight as of this encounter: 212 lb (96.2 kg).     reports that she has never smoked. She has never used smokeless tobacco.      Keyla Reed MD  [facility]  "

## 2018-12-07 NOTE — PATIENT INSTRUCTIONS
[employer] --- check with insurance      Preventive Health Recommendations  Female Ages 50 - 64    Yearly exam: See your health care provider every year in order to  o Review health changes.   o Discuss preventive care.    o Review your medicines if your doctor has prescribed any.      Get a Pap test every three years (unless you have an abnormal result and your provider advises testing more often).    If you get Pap tests with HPV test, you only need to test every 5 years, unless you have an abnormal result.     You do not need a Pap test if your uterus was removed (hysterectomy) and you have not had cancer.    You should be tested each year for STDs (sexually transmitted diseases) if you're at risk.     Have a mammogram every 1 to 2 years.    Have a colonoscopy at age 50, or have a yearly FIT test (stool test). These exams screen for colon cancer.      Have a cholesterol test every 5 years, or more often if advised.    Have a diabetes test (fasting glucose) every three years. If you are at risk for diabetes, you should have this test more often.     If you are at risk for osteoporosis (brittle bone disease), think about having a bone density scan (DEXA).    Shots: Get a flu shot each year. Get a tetanus shot every 10 years.    Nutrition:     Eat at least 5 servings of fruits and vegetables each day.    Eat whole-grain bread, whole-wheat pasta and brown rice instead of white grains and rice.    Get adequate Calcium and Vitamin D.     Lifestyle    Exercise at least 150 minutes a week (30 minutes a day, 5 days a week). This will help you control your weight and prevent disease.    Limit alcohol to one drink per day.    No smoking.     Wear sunscreen to prevent skin cancer.     See your dentist every six months for an exam and cleaning.    See your eye doctor every 1 to 2 years.

## 2018-12-13 ENCOUNTER — TELEPHONE (OUTPATIENT)
Dept: FAMILY MEDICINE | Facility: CLINIC | Age: 58
End: 2018-12-13

## 2019-02-18 ENCOUNTER — OFFICE VISIT (OUTPATIENT)
Dept: INTERNAL MEDICINE | Facility: CLINIC | Age: 59
End: 2019-02-18
Payer: COMMERCIAL

## 2019-02-18 VITALS
OXYGEN SATURATION: 95 % | WEIGHT: 215.9 LBS | RESPIRATION RATE: 17 BRPM | SYSTOLIC BLOOD PRESSURE: 112 MMHG | BODY MASS INDEX: 33.89 KG/M2 | HEIGHT: 67 IN | HEART RATE: 101 BPM | TEMPERATURE: 98.7 F | DIASTOLIC BLOOD PRESSURE: 68 MMHG

## 2019-02-18 DIAGNOSIS — R35.0 URINARY FREQUENCY: Primary | ICD-10-CM

## 2019-02-18 DIAGNOSIS — K52.9 GASTROENTERITIS: ICD-10-CM

## 2019-02-18 LAB
ALBUMIN UR-MCNC: 30 MG/DL
APPEARANCE UR: CLEAR
BACTERIA #/AREA URNS HPF: ABNORMAL /HPF
BASOPHILS # BLD AUTO: 0 10E9/L (ref 0–0.2)
BASOPHILS NFR BLD AUTO: 0.4 %
BILIRUB UR QL STRIP: ABNORMAL
COLOR UR AUTO: YELLOW
DIFFERENTIAL METHOD BLD: NORMAL
EOSINOPHIL # BLD AUTO: 0.1 10E9/L (ref 0–0.7)
EOSINOPHIL NFR BLD AUTO: 2.8 %
ERYTHROCYTE [DISTWIDTH] IN BLOOD BY AUTOMATED COUNT: 13.6 % (ref 10–15)
GLUCOSE UR STRIP-MCNC: NEGATIVE MG/DL
HCT VFR BLD AUTO: 42.2 % (ref 35–47)
HGB BLD-MCNC: 13.5 G/DL (ref 11.7–15.7)
HGB UR QL STRIP: ABNORMAL
KETONES UR STRIP-MCNC: 15 MG/DL
LEUKOCYTE ESTERASE UR QL STRIP: ABNORMAL
LYMPHOCYTES # BLD AUTO: 1.8 10E9/L (ref 0.8–5.3)
LYMPHOCYTES NFR BLD AUTO: 37.7 %
MCH RBC QN AUTO: 29.2 PG (ref 26.5–33)
MCHC RBC AUTO-ENTMCNC: 32 G/DL (ref 31.5–36.5)
MCV RBC AUTO: 91 FL (ref 78–100)
MONOCYTES # BLD AUTO: 0.5 10E9/L (ref 0–1.3)
MONOCYTES NFR BLD AUTO: 10.9 %
NEUTROPHILS # BLD AUTO: 2.3 10E9/L (ref 1.6–8.3)
NEUTROPHILS NFR BLD AUTO: 48.2 %
NITRATE UR QL: NEGATIVE
NON-SQ EPI CELLS #/AREA URNS LPF: ABNORMAL /LPF
PH UR STRIP: 5.5 PH (ref 5–7)
PLATELET # BLD AUTO: 293 10E9/L (ref 150–450)
RBC # BLD AUTO: 4.63 10E12/L (ref 3.8–5.2)
RBC #/AREA URNS AUTO: ABNORMAL /HPF
SOURCE: ABNORMAL
SP GR UR STRIP: 1.02 (ref 1–1.03)
UROBILINOGEN UR STRIP-ACNC: 2 EU/DL (ref 0.2–1)
WBC # BLD AUTO: 4.7 10E9/L (ref 4–11)
WBC #/AREA URNS AUTO: ABNORMAL /HPF

## 2019-02-18 PROCEDURE — 99213 OFFICE O/P EST LOW 20 MIN: CPT | Performed by: NURSE PRACTITIONER

## 2019-02-18 PROCEDURE — 85025 COMPLETE CBC W/AUTO DIFF WBC: CPT | Performed by: NURSE PRACTITIONER

## 2019-02-18 PROCEDURE — 36415 COLL VENOUS BLD VENIPUNCTURE: CPT | Performed by: NURSE PRACTITIONER

## 2019-02-18 PROCEDURE — 81001 URINALYSIS AUTO W/SCOPE: CPT | Performed by: NURSE PRACTITIONER

## 2019-02-18 PROCEDURE — 80048 BASIC METABOLIC PNL TOTAL CA: CPT | Performed by: NURSE PRACTITIONER

## 2019-02-18 ASSESSMENT — MIFFLIN-ST. JEOR: SCORE: 1595.91

## 2019-02-18 NOTE — LETTER
February 20, 2019      Shaye Brian  [address] [place] [address] [address] [place] [address]        Dear ,    We are writing to inform you of your test results.    Your recent lab results were normal.     Resulted Orders   *UA reflex to Microscopic and Culture ([facility] (except [place] and [place])   Result Value Ref Range    Color Urine Yellow     Appearance Urine Clear     Glucose Urine Negative NEG^Negative mg/dL    Bilirubin Urine Small (A) NEG^Negative      Comment:      This is an unconfirmed screening test result. A positive result may be false.    Ketones Urine 15 (A) NEG^Negative mg/dL    Specific Gravity Urine 1.025 1.003 - 1.035    Blood Urine Trace (A) NEG^Negative    pH Urine 5.5 5.0 - 7.0 pH    Protein Albumin Urine 30 (A) NEG^Negative mg/dL    Urobilinogen Urine 2.0 (H) 0.2 - 1.0 EU/dL    Nitrite Urine Negative NEG^Negative    Leukocyte Esterase Urine Small (A) NEG^Negative    Source Midstream Urine    Urine Microscopic   Result Value Ref Range    WBC Urine 0 - 5 OTO5^0 - 5 /HPF    RBC Urine O - 2 OTO2^O - 2 /HPF    Squamous Epithelial /LPF Urine Few FEW^Few /LPF    Bacteria Urine Few (A) NEG^Negative /HPF   CBC with platelets differential   Result Value Ref Range    WBC 4.7 4.0 - 11.0 10e9/L    RBC Count 4.63 3.8 - 5.2 10e12/L    Hemoglobin 13.5 11.7 - 15.7 g/dL    Hematocrit 42.2 35.0 - 47.0 %    MCV 91 78 - 100 fl    MCH 29.2 26.5 - 33.0 pg    MCHC 32.0 31.5 - 36.5 g/dL    RDW 13.6 10.0 - 15.0 %    Platelet Count 293 150 - 450 10e9/L    % Neutrophils 48.2 %    % Lymphocytes 37.7 %    % Monocytes 10.9 %    % Eosinophils 2.8 %    % Basophils 0.4 %    Absolute Neutrophil 2.3 1.6 - 8.3 10e9/L    Absolute Lymphocytes 1.8 0.8 - 5.3 10e9/L    Absolute Monocytes 0.5 0.0 - 1.3 10e9/L    Absolute Eosinophils 0.1 0.0 - 0.7 10e9/L    Absolute Basophils 0.0 0.0 - 0.2 10e9/L    Diff Method Automated Method    Basic metabolic panel   Result Value Ref Range    Sodium 140 133 - 144 mmol/L     Potassium 3.8 3.4 - 5.3 mmol/L    Chloride 107 94 - 109 mmol/L    Carbon Dioxide 26 20 - 32 mmol/L    Anion Gap 7 3 - 14 mmol/L    Glucose 93 70 - 99 mg/dL      Comment:      Non Fasting    Urea Nitrogen 13 7 - 30 mg/dL    Creatinine 0.79 0.52 - 1.04 mg/dL    GFR Estimate 83 >60 mL/min/[1.73_m2]      Comment:      Non  GFR Calc  Starting 12/18/2018, serum creatinine based estimated GFR (eGFR) will be   calculated using the Chronic Kidney Disease Epidemiology Collaboration   (CKD-EPI) equation.      GFR Estimate If Black >90 >60 mL/min/[1.73_m2]      Comment:       GFR Calc  Starting 12/18/2018, serum creatinine based estimated GFR (eGFR) will be   calculated using the Chronic Kidney Disease Epidemiology Collaboration   (CKD-EPI) equation.      Calcium 9.0 8.5 - 10.1 mg/dL       If you have any questions or concerns, please call the clinic at the number listed above.       Sincerely,        Terri Campos NP

## 2019-02-18 NOTE — PROGRESS NOTES
SUBJECTIVE:   Shaye Brian is a 58 year old female who presents to clinic today for the following health issues:      Gastrointestinal symptoms      Duration: 1 week    Description:           REFLUX SYMPTOMS - heartburn             ABDOMINAL PAIN - epigastric area  .   Pain is described as gnawing and radiates to nowhere.    Intensity:  moderate    Accompanying signs and symptoms:  nausea, vomitting, diarrhea and bloating, generalized myalgias, L lower back pain    History  Previous {similar problem: no   Previous evaluation:  none    Aggravating factors: none    Alleviating factors: nothing    Other Therapies tried: None    URINARY TRACT SYMPTOMS      Duration: 1 week    Description  frequency and back pain    Intensity:  moderate    Accompanying signs and symptoms:  Fever/chills: no   Flank pain no   Nausea and vomiting: no   Vaginal symptoms: none  Abdominal/Pelvic Pain: YES- see above    History  History of frequent UTI's: no   History of kidney stones: no   Sexually Active: YES  Possibility of pregnancy: No    Precipitating or alleviating factors: None    Therapies tried and outcome: pyridium    Outcome: not helpful          Problem list and histories reviewed & adjusted, as indicated.  Additional history: as documented    Patient Active Problem List   Diagnosis     Hyperlipidemia LDL goal <160     Status post total abdominal hysterectomy     Prediabetes     Past Surgical History:   Procedure Laterality Date     D & C  1984    after miscarriage-first time     DAVINCI HYSTERECTOMY TOTAL, BILATERAL SALPINGO-OOPHORECTOMY, COMBINED  12/27/2013    Procedure: COMBINED DAVINCI HYSTERECTOMY TOTAL, SALPINGO-OOPHORECTOMY;  Combined Davinci Total Laparoscopic HYSTERECTOMY, Bilateral Salpingectomy, Cystoscopy;  Surgeon: Karolina Eduardo DO;  Location:  OR       Social History     Tobacco Use     Smoking status: Never Smoker     Smokeless tobacco: Never Used   Substance Use Topics     Alcohol use: Yes      "Alcohol/week: 0.0 oz     Comment: socially -once every one month      Family History   Problem Relation Age of Onset     Alzheimer Disease Mother         macular degeneration     Cancer Mother         brain tumor     Hypertension Mother      Macular Degeneration Mother      Heart Disease Father      Cancer Father         ureter cancer     Respiratory Father         emphysema, smoker     Breast Cancer Other         maternal aunt-using hormones     Cancer Other         dad's sister with ovarian cancer, her daughter had ovarian cancer     Cancer - colorectal No family hx of          Current Outpatient Medications   Medication Sig Dispense Refill     aspirin-acetaminophen-caffeine (EXCEDRIN MIGRAINE) 250-250-65 MG per tablet Take 1 tablet by mouth       BP Readings from Last 3 Encounters:   02/18/19 112/68   12/07/18 105/71   05/26/18 108/78    Wt Readings from Last 3 Encounters:   02/18/19 97.9 kg (215 lb 14.4 oz)   12/07/18 96.2 kg (212 lb)   05/26/18 96.2 kg (212 lb)                    Reviewed and updated as needed this visit by clinical staff  Tobacco  Allergies  Meds  Med Hx  Surg Hx  Fam Hx  Soc Hx      Reviewed and updated as needed this visit by Provider         ROS:  Constitutional, HEENT, cardiovascular, pulmonary, gi and gu systems are negative, except as otherwise noted.    OBJECTIVE:     /68   Pulse 101   Temp 98.7  F (37.1  C) (Oral)   Resp 17   Ht 1.708 m (5' 7.25\")   Wt 97.9 kg (215 lb 14.4 oz)   LMP 12/05/2013   SpO2 95%   BMI 33.56 kg/m    Body mass index is 33.56 kg/m .  GENERAL: healthy, alert and no distress  RESP: lungs clear to auscultation - no rales, rhonchi or wheezes  CV: regular rate and rhythm, normal S1 S2, no S3 or S4, no murmur, click or rub, no peripheral edema and peripheral pulses strong  ABDOMEN: soft, nontender, no hepatosplenomegaly, no masses and bowel sounds normal  BACK: no CVA tenderness, no paralumbar tenderness    Results for orders placed or performed in " visit on 02/18/19 (from the past 24 hour(s))   *UA reflex to Microscopic and Culture ([facility] (except [place] and [place])   Result Value Ref Range    Color Urine Yellow     Appearance Urine Clear     Glucose Urine Negative NEG^Negative mg/dL    Bilirubin Urine Small (A) NEG^Negative    Ketones Urine 15 (A) NEG^Negative mg/dL    Specific Gravity Urine 1.025 1.003 - 1.035    Blood Urine Trace (A) NEG^Negative    pH Urine 5.5 5.0 - 7.0 pH    Protein Albumin Urine 30 (A) NEG^Negative mg/dL    Urobilinogen Urine 2.0 (H) 0.2 - 1.0 EU/dL    Nitrite Urine Negative NEG^Negative    Leukocyte Esterase Urine Small (A) NEG^Negative    Source Midstream Urine    Urine Microscopic   Result Value Ref Range    WBC Urine 0 - 5 OTO5^0 - 5 /HPF    RBC Urine O - 2 OTO2^O - 2 /HPF    Squamous Epithelial /LPF Urine Few FEW^Few /LPF    Bacteria Urine Few (A) NEG^Negative /HPF       ASSESSMENT/PLAN:               ICD-10-CM    1. Urinary frequency R35.0 *UA reflex to Microscopic and Culture ([facility] (except [place] and [place])     Urine Microscopic   2. Gastroenteritis K52.9 CBC with platelets differential     Basic metabolic panel       Push fluids, NSAIDs, home cares, ADAT    Terri Campos NP  [facility]

## 2019-02-19 ENCOUNTER — TELEPHONE (OUTPATIENT)
Dept: INTERNAL MEDICINE | Facility: CLINIC | Age: 59
End: 2019-02-19

## 2019-02-19 LAB
ANION GAP SERPL CALCULATED.3IONS-SCNC: 7 MMOL/L (ref 3–14)
BUN SERPL-MCNC: 13 MG/DL (ref 7–30)
CALCIUM SERPL-MCNC: 9 MG/DL (ref 8.5–10.1)
CHLORIDE SERPL-SCNC: 107 MMOL/L (ref 94–109)
CO2 SERPL-SCNC: 26 MMOL/L (ref 20–32)
CREAT SERPL-MCNC: 0.79 MG/DL (ref 0.52–1.04)
GFR SERPL CREATININE-BSD FRML MDRD: 83 ML/MIN/{1.73_M2}
GLUCOSE SERPL-MCNC: 93 MG/DL (ref 70–99)
POTASSIUM SERPL-SCNC: 3.8 MMOL/L (ref 3.4–5.3)
SODIUM SERPL-SCNC: 140 MMOL/L (ref 133–144)

## 2019-02-19 NOTE — TELEPHONE ENCOUNTER
Patient calling--Patient continues to have back pain that is slowly improving.  She has had diarrhea today and also vomiting, but has been able to keep a small amount of solids and has drank some water and Gatorade   Patient took some Gas X.  Patient reviewed her results on MyChart and is concerned about the results on the macroscopic urine since several of them were abnormal.  She questions if these should be rechecked again since she continues to have pain.  Please advise.  Donita Danielle RN

## 2019-02-20 NOTE — TELEPHONE ENCOUNTER
Please advise pt values outside of normal range on macro UA are due to decreased food intake and body breaking down glucose and fat stores for energy. Signs of infection would be shown in micro UA elevated WBC.  Terri Campos CNP

## 2019-07-29 ENCOUNTER — OFFICE VISIT (OUTPATIENT)
Dept: OBGYN | Facility: CLINIC | Age: 59
End: 2019-07-29
Payer: COMMERCIAL

## 2019-07-29 VITALS
BODY MASS INDEX: 34.53 KG/M2 | HEIGHT: 67 IN | DIASTOLIC BLOOD PRESSURE: 78 MMHG | WEIGHT: 220 LBS | SYSTOLIC BLOOD PRESSURE: 112 MMHG

## 2019-07-29 DIAGNOSIS — Z90.79 H/O BILATERAL SALPINGECTOMY: ICD-10-CM

## 2019-07-29 DIAGNOSIS — R14.0 ABDOMINAL BLOATING: Primary | ICD-10-CM

## 2019-07-29 PROBLEM — Z90.710 HISTORY OF ROBOT-ASSISTED LAPAROSCOPIC HYSTERECTOMY: Status: ACTIVE | Noted: 2018-12-07

## 2019-07-29 PROCEDURE — 99214 OFFICE O/P EST MOD 30 MIN: CPT | Performed by: OBSTETRICS & GYNECOLOGY

## 2019-07-29 ASSESSMENT — ENCOUNTER SYMPTOMS
VOMITING: 0
FREQUENCY: 0
DIARRHEA: 0
FEVER: 0
CONSTIPATION: 0
ABDOMINAL DISTENTION: 1
CHILLS: 0
ABDOMINAL PAIN: 0
DYSURIA: 0
NAUSEA: 0
UNEXPECTED WEIGHT CHANGE: 0

## 2019-07-29 ASSESSMENT — MIFFLIN-ST. JEOR: SCORE: 1614.5

## 2019-07-29 NOTE — NURSING NOTE
"Chief Complaint   Patient presents with     Gyn Exam     Bloating, vaginal pain        Initial /78 (BP Location: Right arm, Patient Position: Sitting, Cuff Size: Adult Regular)   Ht 1.708 m (5' 7.25\")   Wt 99.8 kg (220 lb)   LMP 2013   BMI 34.20 kg/m   Estimated body mass index is 34.2 kg/m  as calculated from the following:    Height as of this encounter: 1.708 m (5' 7.25\").    Weight as of this encounter: 99.8 kg (220 lb).  BP completed using cuff size: regular    Questioned patient about current smoking habits.  Pt. has never smoked.          The following HM Due: NONE    Paulo Sequeira CMA                "

## 2019-07-29 NOTE — PROGRESS NOTES
SUBJECTIVE:                                                   Shaye Brian is a 58 year old female who presents to clinic today with the Chief Complaint of:  Patient presents with:  Gyn Exam: Bloating, vaginal pain       Concern - Abdominal bloating  Onset: 6-12 months ago    Description:   Intermittent generalized abdominal bloating, no pain, not definitely associated w/ passing flatus.  Is s/p Robotic TLH, Bilat salpingectomy for myomas, adenomyosis and menorrhagia 2013, path all benign.    Intensity: mild    Progression of Symptoms:  same    Accompanying Signs & Symptoms:  No N/V/C/D.  Normal BMs.    Previous history of similar problem:   None    Precipitating factors:   Worsened by: Eating sometimes makes it worse, but doesn't always increase bloating.    Alleviating factors:  Improved by: None    Therapies Tried and outcome: Nothing    Review of pertinent old records reveals:  13 underwent RATLH, Bilateral salpingectomy, path all benign, Pt underwent hormonal menopause shortly afterwards.    Patient's last menstrual period was 2013..   Patient is sexually active, .  Using hysterectomy for contraception.    reports that she has never smoked. She has never used smokeless tobacco.    Problem list and histories reviewed & adjusted, as indicated.  Additional history: as documented.    Patient Active Problem List   Diagnosis     Hyperlipidemia LDL goal <160     History of robot-assisted laparoscopic hysterectomy     Prediabetes     Abdominal bloating     H/O bilateral salpingectomy     Past Surgical History:   Procedure Laterality Date     D & C      after miscarriage-first time     DAVINCI HYSTERECTOMY TOTAL, BILATERAL SALPINGO-OOPHORECTOMY, COMBINED  2013    Procedure: COMBINED DAVINCI HYSTERECTOMY TOTAL, SALPINGO-OOPHORECTOMY;  Combined Davinci Total Laparoscopic HYSTERECTOMY, Bilateral Salpingectomy, Cystoscopy;  Surgeon: Karolina Eduardo DO;  Location:  OR - Path  "Fibroids, Adenomyosis      Social History     Tobacco Use     Smoking status: Never Smoker     Smokeless tobacco: Never Used   Substance Use Topics     Alcohol use: Yes     Alcohol/week: 0.0 oz     Comment: socially -once every one month       Problem (# of Occurrences) Relation (Name,Age of Onset)    Alzheimer Disease (1) Mother: macular degeneration    Breast Cancer (1) Other: maternal aunt-using hormones    Cancer (3) Mother: brain tumor, Father: ureter cancer, Other: dad's sister with ovarian cancer, her daughter had ovarian cancer    Heart Disease (1) Father    Hypertension (1) Mother    Macular Degeneration (1) Mother    Respiratory (1) Father: emphysema, smoker       Negative family history of: Cancer - colorectal            Current Outpatient Medications   Medication Sig     aspirin-acetaminophen-caffeine (EXCEDRIN MIGRAINE) 250-250-65 MG per tablet Take 1 tablet by mouth     No current facility-administered medications for this visit.      Allergies   Allergen Reactions     Iron      Iron infusion; throat sensitivity and redness on her chest       ROS:  Review of Systems   Constitutional: Negative for chills, fever and unexpected weight change.   Gastrointestinal: Positive for abdominal distention. Negative for abdominal pain, constipation, diarrhea, nausea and vomiting.   Genitourinary: Negative for dysuria, frequency, pelvic pain, vaginal bleeding and vaginal discharge.   All other systems reviewed and are negative.        OBJECTIVE:     /78 (BP Location: Right arm, Patient Position: Sitting, Cuff Size: Adult Regular)   Ht 1.708 m (5' 7.25\")   Wt 99.8 kg (220 lb)   LMP 12/05/2013   BMI 34.20 kg/m    Body mass index is 34.2 kg/m .    Exam:  Physical Exam   Constitutional: She appears well-developed and well-nourished. No distress.   HENT:   Head: Normocephalic.   Pulmonary/Chest: Effort normal.   Neurological: She is alert.   Skin: She is not diaphoretic.   Psychiatric: She has a normal mood and " affect.   Abdomen- Abdomen soft, non-tender. BS normal. No masses, organomegaly, positive findings: obese  Pelvic- Exam chaperoned by nurse, External genitalia normal, [place]'s glands normal, [place]'s glands normal, Urethral meatus normal, Urethra normal, Bladder normal, Vagina with normal rugae, no abnormal lesions, no abnormal discharge, Post-hysterectomy status, vaginal cuff well healed, normal rugation, no lesions or abnormal discharge, Uterus surgically absent, Adnexa normal size without masses or tenderness bilaterally, Anus normal        ASSESSMENT:                                                      Encounter Diagnoses   Name Primary?     Abdominal bloating Yes     H/O bilateral salpingectomy          PLAN:                                                      1)  Pelvic U/S to assess ovaries.  2)  If any pelvic masses, consider CT, and possible Gyn Onc referral.  3)  If all neg, Pt's bloating is possibly GI related, may be due to dietary changes.  Consider f/u PCP for this.  Dietary journal to see if pattern of certain foods increase bloating.      Anibal Schwartz MD  [facility]

## 2019-08-06 DIAGNOSIS — R14.0 ABDOMINAL BLOATING: ICD-10-CM

## 2019-11-05 ENCOUNTER — HOSPITAL ENCOUNTER (OUTPATIENT)
Dept: MAMMOGRAPHY | Facility: CLINIC | Age: 59
Discharge: HOME | End: 2019-11-05
Attending: FAMILY MEDICINE | Admitting: FAMILY MEDICINE
Payer: COMMERCIAL

## 2019-11-05 DIAGNOSIS — Z12.31 VISIT FOR SCREENING MAMMOGRAM: ICD-10-CM

## 2019-11-05 PROCEDURE — 77067 SCR MAMMO BI INCL CAD: CPT

## 2019-11-06 ENCOUNTER — TRANSFERRED RECORDS (OUTPATIENT)
Dept: HEALTH INFORMATION MANAGEMENT | Facility: CLINIC | Age: 59
End: 2019-11-06

## 2019-11-06 ENCOUNTER — HEALTH MAINTENANCE LETTER (OUTPATIENT)
Age: 59
End: 2019-11-06

## 2020-01-31 ENCOUNTER — OFFICE VISIT (OUTPATIENT)
Dept: FAMILY MEDICINE | Facility: CLINIC | Age: 60
End: 2020-01-31
Payer: COMMERCIAL

## 2020-01-31 VITALS
WEIGHT: 196 LBS | SYSTOLIC BLOOD PRESSURE: 110 MMHG | HEIGHT: 67 IN | DIASTOLIC BLOOD PRESSURE: 62 MMHG | TEMPERATURE: 97.6 F | HEART RATE: 72 BPM | BODY MASS INDEX: 30.76 KG/M2 | RESPIRATION RATE: 14 BRPM

## 2020-01-31 DIAGNOSIS — Z00.00 ROUTINE GENERAL MEDICAL EXAMINATION AT A HEALTH CARE FACILITY: Primary | ICD-10-CM

## 2020-01-31 DIAGNOSIS — H60.543 DERMATITIS OF BOTH EAR CANALS: ICD-10-CM

## 2020-01-31 DIAGNOSIS — Z90.710 HISTORY OF ROBOT-ASSISTED LAPAROSCOPIC HYSTERECTOMY: ICD-10-CM

## 2020-01-31 DIAGNOSIS — M25.552 HIP PAIN, LEFT: ICD-10-CM

## 2020-01-31 DIAGNOSIS — E78.5 HYPERLIPIDEMIA LDL GOAL <160: ICD-10-CM

## 2020-01-31 DIAGNOSIS — R73.03 PREDIABETES: ICD-10-CM

## 2020-01-31 PROBLEM — R14.0 ABDOMINAL BLOATING: Status: RESOLVED | Noted: 2019-07-29 | Resolved: 2020-01-31

## 2020-01-31 LAB
CHOLEST SERPL-MCNC: 198 MG/DL
HBA1C MFR BLD: 6 % (ref 0–5.6)
HDLC SERPL-MCNC: 51 MG/DL
LDLC SERPL CALC-MCNC: 133 MG/DL
NONHDLC SERPL-MCNC: 147 MG/DL
TRIGL SERPL-MCNC: 71 MG/DL

## 2020-01-31 PROCEDURE — 80061 LIPID PANEL: CPT | Performed by: FAMILY MEDICINE

## 2020-01-31 PROCEDURE — 36415 COLL VENOUS BLD VENIPUNCTURE: CPT | Performed by: FAMILY MEDICINE

## 2020-01-31 PROCEDURE — 83036 HEMOGLOBIN GLYCOSYLATED A1C: CPT | Performed by: FAMILY MEDICINE

## 2020-01-31 PROCEDURE — 99213 OFFICE O/P EST LOW 20 MIN: CPT | Mod: 25 | Performed by: FAMILY MEDICINE

## 2020-01-31 PROCEDURE — 99396 PREV VISIT EST AGE 40-64: CPT | Performed by: FAMILY MEDICINE

## 2020-01-31 RX ORDER — TRIAMCINOLONE ACETONIDE 1 MG/G
CREAM TOPICAL 2 TIMES DAILY
Qty: 15 G | Refills: 1 | Status: SHIPPED | OUTPATIENT
Start: 2020-01-31 | End: 2022-01-21

## 2020-01-31 ASSESSMENT — ENCOUNTER SYMPTOMS
CHILLS: 0
HEMATOCHEZIA: 0
NERVOUS/ANXIOUS: 0
DIARRHEA: 0
WEAKNESS: 0
NAUSEA: 0
PALPITATIONS: 0
ABDOMINAL PAIN: 0
COUGH: 0
DIZZINESS: 0
PARESTHESIAS: 0
MYALGIAS: 0
JOINT SWELLING: 0
HEADACHES: 0
SORE THROAT: 0
HEARTBURN: 0
ARTHRALGIAS: 0
DYSURIA: 0
CONSTIPATION: 0
EYE PAIN: 0
FEVER: 0
FREQUENCY: 0
HEMATURIA: 0
BREAST MASS: 0
SHORTNESS OF BREATH: 0

## 2020-01-31 ASSESSMENT — MIFFLIN-ST. JEOR: SCORE: 1500.64

## 2020-01-31 NOTE — PROGRESS NOTES
SUBJECTIVE:   CC: Shaye Brian is an 59 year old woman who presents for preventive health visit.     Healthy Habits:     Getting at least 3 servings of Calcium per day:  Yes    Bi-annual eye exam:  Yes    Dental care twice a year:  Yes    Sleep apnea or symptoms of sleep apnea:  None    Diet:  Regular (no restrictions)    Frequency of exercise:  None    Taking medications regularly:  Yes    Medication side effects:  None    PHQ-2 Total Score: 0    Additional concerns today:  Yes      1. Left hip pain - intermittently, occurring every few months, lasting a day, then resolves on its own. Pain is anterior hip. Pain occurs with walking, none otherwise. No known injuries.     2. Left ear dermatitis - a couple times weekly, having itching of the left ear canal. Has a topical she was given in the [facility] - looks like an antibiotic, antifungal, and steroid. Would like something comparable to help with itching. No pain. No drainage.     3. Prediabetes - has had 27 lb weight loss since her physical last year, counting calories.       Today's PHQ-2 Score:   PHQ-2 ( 1999 Pfizer) 1/31/2020   Q1: Little interest or pleasure in doing things 0   Q2: Feeling down, depressed or hopeless 0   PHQ-2 Score 0   Q1: Little interest or pleasure in doing things Not at all   Q2: Feeling down, depressed or hopeless Not at all   PHQ-2 Score 0       Abuse: Current or Past(Physical, Sexual or Emotional)- No  Do you feel safe in your environment? Yes      Social History     Tobacco Use     Smoking status: Never Smoker     Smokeless tobacco: Never Used   Substance Use Topics     Alcohol use: Yes     Alcohol/week: 0.0 standard drinks     Comment: socially -once every one month          Alcohol Use 1/31/2020   Prescreen: >3 drinks/day or >7 drinks/week? No   Prescreen: >3 drinks/day or >7 drinks/week? -   No flowsheet data found.    Reviewed orders with patient.  Reviewed health maintenance and updated orders accordingly - Yes  Patient  Active Problem List   Diagnosis     Hyperlipidemia LDL goal <160     History of robot-assisted laparoscopic hysterectomy     Prediabetes     H/O bilateral salpingectomy     Past Surgical History:   Procedure Laterality Date     D & C  1984    after miscarriage-first time     DAVINCI HYSTERECTOMY TOTAL, BILATERAL SALPINGO-OOPHORECTOMY, COMBINED  12/27/2013    Procedure: COMBINED DAVINCI HYSTERECTOMY TOTAL, SALPINGectomy;  Combined Davinci Total Laparoscopic HYSTERECTOMY, Bilateral Salpingectomy, Cystoscopy;  Surgeon: Karolina Eduardo DO;  Location: [facility] OR - Path Fibroids, Adenomyosis       Social History     Tobacco Use     Smoking status: Never Smoker     Smokeless tobacco: Never Used   Substance Use Topics     Alcohol use: Yes     Alcohol/week: 0.0 standard drinks     Comment: socially -once every one month      Family History   Problem Relation Age of Onset     Alzheimer Disease Mother         macular degeneration     Cancer Mother         brain tumor     Hypertension Mother      Macular Degeneration Mother      Heart Disease Father      Cancer Father         ureter cancer     Respiratory Father         emphysema, smoker     Breast Cancer Other         maternal aunt-using hormones     Cancer Other         dad's sister with ovarian cancer, her daughter had ovarian cancer     Cancer - colorectal No family hx of            Mammogram Screening: Patient over age 50, mutual decision to screen reflected in health maintenance.    Pertinent mammograms are reviewed under the imaging tab.  History of abnormal Pap smear: Status post benign hysterectomy. Health Maintenance and Surgical History updated.  PAP / HPV 5/6/2013   PAP NIL     Reviewed and updated as needed this visit by clinical staff  Tobacco  Allergies  Meds  Med Hx  Surg Hx  Fam Hx  Soc Hx        Reviewed and updated as needed this visit by Provider  Meds  Surg Hx            Review of Systems   Constitutional: Negative for chills and fever.   HENT:  "Negative for congestion, ear pain, hearing loss and sore throat.    Eyes: Negative for pain and visual disturbance.   Respiratory: Negative for cough and shortness of breath.    Cardiovascular: Negative for chest pain, palpitations and peripheral edema.   Gastrointestinal: Negative for abdominal pain, constipation, diarrhea, heartburn, hematochezia and nausea.   Breasts:  Negative for tenderness, breast mass and discharge.   Genitourinary: Negative for dysuria, frequency, genital sores, hematuria, pelvic pain, urgency, vaginal bleeding and vaginal discharge.   Musculoskeletal: Negative for arthralgias, joint swelling and myalgias.   Skin: Negative for rash.   Neurological: Negative for dizziness, weakness, headaches and paresthesias.   Psychiatric/Behavioral: Negative for mood changes. The patient is not nervous/anxious.         OBJECTIVE:   /62 (BP Location: Right arm, Patient Position: Chair, Cuff Size: Adult Regular)   Pulse 72   Temp 97.6  F (36.4  C) (Oral)   Resp 14   Ht 1.708 m (5' 7.25\")   Wt 88.9 kg (196 lb)   LMP 12/05/2013   BMI 30.47 kg/m    Physical Exam  GENERAL APPEARANCE: healthy, alert and no distress  EYES: Eyes grossly normal to inspection, PERRL and conjunctivae and sclerae normal  HENT: ear canals and TM's normal, nose and mouth without ulcers or lesions, oropharynx clear and oral mucous membranes moist  NECK: no adenopathy, no asymmetry, masses, or scars and thyroid normal to palpation  RESP: lungs clear to auscultation - no rales, rhonchi or wheezes  BREAST: normal without masses, tenderness or nipple discharge and no palpable axillary masses or adenopathy  CV: regular rate and rhythm, normal S1 S2, no S3 or S4, no murmur, click or rub, no peripheral edema and peripheral pulses strong  ABDOMEN: soft, nontender, no hepatosplenomegaly, no masses and bowel sounds normal  MS: no musculoskeletal defects are noted and gait is age appropriate without ataxia  SKIN: no suspicious lesions " "or rashes  NEURO: Normal strength and tone, sensory exam grossly normal, mentation intact and speech normal  PSYCH: mentation appears normal and affect normal/bright    Diagnostic Test Results:  Labs reviewed in Epic    ASSESSMENT/PLAN:   1. Routine general medical examination at a health care facility  - Lipid panel reflex to direct LDL Fasting  - Hemoglobin A1c    2. Dermatitis of both ear canals - discussed concerns with long term steroid use, will opt for mid level potency, use sparingly, call with concerns.   - triamcinolone (KENALOG) 0.1 % external cream; Apply topically 2 times daily  Dispense: 15 g; Refill: 1    3. History of robot-assisted laparoscopic hysterectomy    4. Prediabetes - recheck today, has had weight loss in the past year, expect levels to look better.     5. Hyperlipidemia LDL goal <160 - lipids today    6. Hip pain, left - normal exam today, discussed likely benign as occurring intermittently, resolves on its own. Can monitor and call if symptoms worsen.       COUNSELING:  Reviewed preventive health counseling, as reflected in patient instructions    Estimated body mass index is 30.47 kg/m  as calculated from the following:    Height as of this encounter: 1.708 m (5' 7.25\").    Weight as of this encounter: 88.9 kg (196 lb).     reports that she has never smoked. She has never used smokeless tobacco.    Keyla Reed MD  [facility]  "

## 2020-02-17 ENCOUNTER — OFFICE VISIT (OUTPATIENT)
Dept: PEDIATRICS | Facility: CLINIC | Age: 60
End: 2020-02-17
Payer: COMMERCIAL

## 2020-02-17 VITALS
TEMPERATURE: 98.5 F | OXYGEN SATURATION: 98 % | BODY MASS INDEX: 31.39 KG/M2 | SYSTOLIC BLOOD PRESSURE: 108 MMHG | HEART RATE: 83 BPM | RESPIRATION RATE: 16 BRPM | HEIGHT: 67 IN | WEIGHT: 200 LBS | DIASTOLIC BLOOD PRESSURE: 72 MMHG

## 2020-02-17 DIAGNOSIS — R30.0 DYSURIA: ICD-10-CM

## 2020-02-17 DIAGNOSIS — N30.01 ACUTE CYSTITIS WITH HEMATURIA: Primary | ICD-10-CM

## 2020-02-17 DIAGNOSIS — R35.0 URINARY FREQUENCY: ICD-10-CM

## 2020-02-17 LAB
ALBUMIN UR-MCNC: NEGATIVE MG/DL
APPEARANCE UR: CLEAR
BACTERIA #/AREA URNS HPF: ABNORMAL /HPF
BILIRUB UR QL STRIP: NEGATIVE
COLOR UR AUTO: YELLOW
GLUCOSE UR STRIP-MCNC: NEGATIVE MG/DL
HGB UR QL STRIP: ABNORMAL
KETONES UR STRIP-MCNC: NEGATIVE MG/DL
LEUKOCYTE ESTERASE UR QL STRIP: ABNORMAL
NITRATE UR QL: NEGATIVE
NON-SQ EPI CELLS #/AREA URNS LPF: ABNORMAL /LPF
PH UR STRIP: 6 PH (ref 5–7)
RBC #/AREA URNS AUTO: ABNORMAL /HPF
SOURCE: ABNORMAL
SP GR UR STRIP: 1.01 (ref 1–1.03)
SPECIMEN SOURCE: NORMAL
UROBILINOGEN UR STRIP-ACNC: 0.2 EU/DL (ref 0.2–1)
WBC #/AREA URNS AUTO: ABNORMAL /HPF
WET PREP SPEC: NORMAL

## 2020-02-17 PROCEDURE — 87210 SMEAR WET MOUNT SALINE/INK: CPT | Performed by: NURSE PRACTITIONER

## 2020-02-17 PROCEDURE — 99213 OFFICE O/P EST LOW 20 MIN: CPT | Performed by: NURSE PRACTITIONER

## 2020-02-17 PROCEDURE — 87086 URINE CULTURE/COLONY COUNT: CPT | Performed by: NURSE PRACTITIONER

## 2020-02-17 PROCEDURE — 81001 URINALYSIS AUTO W/SCOPE: CPT | Performed by: NURSE PRACTITIONER

## 2020-02-17 RX ORDER — SULFAMETHOXAZOLE/TRIMETHOPRIM 800-160 MG
1 TABLET ORAL 2 TIMES DAILY
Qty: 6 TABLET | Refills: 0 | Status: SHIPPED | OUTPATIENT
Start: 2020-02-17 | End: 2020-02-20

## 2020-02-17 ASSESSMENT — MIFFLIN-ST. JEOR: SCORE: 1518.78

## 2020-02-17 NOTE — PATIENT INSTRUCTIONS
"Begin antibiotics.  Push fluids.  I will keep you posted on the urine culture results.Patient Education     Bladder Infection, Female (Adult)    Urine is normally doesn't have any bacteria in it. But bacteria can get into the urinary tract from the skin around the rectum. Or they can travel in the blood from elsewhere in the body. Once they are in your urinary tract, they can cause infection in the urethra (urethritis), the bladder (cystitis), or the kidneys (pyelonephritis).  The most common place for an infection is in the bladder. This is called a bladder infection. This is one of the most common infections in women. Most bladder infections are easily treated. They are not serious unless the infection spreads to the kidney.  The phrases \"bladder infection,\" \"UTI,\" and \"cystitis\" are often used to describe the same thing. But they are not always the same. Cystitis is an inflammation of the bladder. The most common cause of cystitis is an infection.  Symptoms  The infection causes inflammation in the urethra and bladder. This causes many of the symptoms. The most common symptoms of a bladder infection are:    Pain or burning when urinating    Having to urinate more often than usual    Urgent need to urinate    Only a small amount of urine comes out    Blood in urine    Abdominal discomfort. This is usually in the lower abdomen above the pubic bone.    Cloudy urine    Strong- or bad-smelling urine    Unable to urinate (urinary retention)    Unable to hold urine in (urinary incontinence)    Fever    Loss of appetite    Confusion (in older adults)  Causes  Bladder infections are not contagious. You can't get one from someone else, from a toilet seat, or from sharing a bath.  The most common cause of bladder infections is bacteria from the bowels. The bacteria get onto the skin around the opening of the urethra. From there, they can get into the urine and travel up to the bladder, causing inflammation and infection. " This usually happens because of:    Wiping improperly after urinating. Always wipe from front to back.    Bowel incontinence    Pregnancy    Procedures such as having a catheter inserted    Older age    Not emptying your bladder. This can allow bacteria a chance to grow in your urine.    Dehydration    Constipation    Sex    Use of a diaphragm for birth control   Treatment  Bladder infections are diagnosed by a urine test. They are treated with antibiotics and usually clear up quickly without complications. Treatment helps prevent a more serious kidney infection.  Medicines  Medicines can help in the treatment of a bladder infection:    Take antibiotics until they are used up, even if you feel better. It is important to finish them to make sure the infection has cleared.    You can use acetaminophen or ibuprofen for pain, fever, or discomfort, unless another medicine was prescribed. If you have chronic liver or kidney disease, talk with your healthcare provider before using these medicines. Also talk with your provider if you've ever had a stomach ulcer or gastrointestinal bleeding, or are taking blood-thinner medicines.    If you are given phenazopydridine to reduce burning with urination, it will cause your urine to become a bright orange color. This can stain clothing.  Care and prevention  These self-care steps can help prevent future infections:    Drink plenty of fluids to prevent dehydration and flush out your bladder. Do this unless you must restrict fluids for other health reasons, or your doctor told you not to.    Proper cleaning after going to the bathroom is important. Wipe from front to back after using the toilet to prevent the spread of bacteria.    Urinate more often. Don't try to hold urine in for a long time.    Wear loose-fitting clothes and cotton underwear. Avoid tight-fitting pants.    Improve your diet and prevent constipation. Eat more fresh fruit and vegetables, and fiber, and less junk and  fatty foods.    Avoid sex until your symptoms are gone.    Avoid caffeine, alcohol, and spicy foods. These can irritate your bladder.    Urinate right after intercourse to flush out your bladder.    If you use birth control pills and have frequent bladder infections, discuss it with your doctor.  Follow-up care  Call your healthcare provider if all symptoms are not gone after 3 days of treatment. This is especially important if you have repeat infections.  If a culture was done, you will be told if your treatment needs to be changed. If directed, you can call to find out the results.  If X-rays were done, you will be told if the results will affect your treatment.  Call 911  Call 911 if any of the following occur:    Trouble breathing    Hard to wake up or confusion    Fainting or loss of consciousness    Rapid heart rate  When to seek medical advice  Call your healthcare provider right away if any of these occur:    Fever of 100.4 F (38.0 C) or higher, or as directed by your healthcare provider    Symptoms are not better by the third day of treatment    Back or belly (abdominal) pain that gets worse    Repeated vomiting, or unable to keep medicine down    Weakness or dizziness    Vaginal discharge    Pain, redness, or swelling in the outer vaginal area (labia)  Date Last Reviewed: 10/1/2016    6941-9996 [employer]. [address] [place] [address], [place] [address]. All rights reserved. This information is not intended as a substitute for professional medical care. Always follow your healthcare professional's instructions.

## 2020-02-17 NOTE — PROGRESS NOTES
"Subjective     Shaye Brian is a 59 year old female who presents to clinic today for the following health issues:    HPI   URINARY TRACT SYMPTOMS      Duration: 2 days    Description  dysuria, frequency, urgency, hematuria, nocturia x 2, retention and back pain    Intensity:  Mild; frequency is moderate     Accompanying signs and symptoms:  Fever/chills: no   Flank pain no (chronic back pain)  Nausea and vomiting: no   Vaginal symptoms: none  Abdominal/Pelvic Pain: YES- mild pelvic pain, like something pulling/cramping     History  History of frequent UTI's: YES- been awhile  History of kidney stones: no   Sexually Active: YES  Possibility of pregnancy: No    Precipitating or alleviating factors: None    Therapies tried and outcome: azo, strong strength    Outcome: takes the pain away and urge to pee.     No fevers.  Has seen urologist in the past (2017) for recurrent UTIs, had normal work-up. No UTI in the past 1 year.    Reviewed and updated as needed this visit by Provider  Meds         Review of Systems   Otherwise ROS is negative except as stated above.        Objective    /72 (BP Location: Right arm, Patient Position: Sitting, Cuff Size: Adult Regular)   Pulse 83   Temp 98.5  F (36.9  C) (Tympanic)   Resp 16   Ht 1.708 m (5' 7.25\")   Wt 90.7 kg (200 lb)   LMP 12/05/2013   SpO2 98%   BMI 31.09 kg/m    Body mass index is 31.09 kg/m .  Physical Exam   GENERAL: healthy, alert and no distress  RESP: lungs clear to auscultation - no rales, rhonchi or wheezes  CV: regular rate and rhythm, normal S1 S2, no S3 or S4, no murmur, click or rub  ABDOMEN: soft, nontender  BACK: no CVA tenderness    Diagnostic Test Results:  Results for orders placed or performed in visit on 02/17/20 (from the past 24 hour(s))   *UA reflex to Microscopic and Culture ([facility] (except [place] and [place])   Result Value Ref Range    Color Urine Yellow     Appearance Urine Clear     Glucose Urine Negative " NEG^Negative mg/dL    Bilirubin Urine Negative NEG^Negative    Ketones Urine Negative NEG^Negative mg/dL    Specific Gravity Urine 1.015 1.003 - 1.035    Blood Urine Small (A) NEG^Negative    pH Urine 6.0 5.0 - 7.0 pH    Protein Albumin Urine Negative NEG^Negative mg/dL    Urobilinogen Urine 0.2 0.2 - 1.0 EU/dL    Nitrite Urine Negative NEG^Negative    Leukocyte Esterase Urine Small (A) NEG^Negative    Source Midstream Urine    Urine Microscopic   Result Value Ref Range    WBC Urine 0 - 5 OTO5^0 - 5 /HPF    RBC Urine 5-10 (A) OTO2^O - 2 /HPF    Squamous Epithelial /LPF Urine Few FEW^Few /LPF    Bacteria Urine Few (A) NEG^Negative /HPF   Wet prep   Result Value Ref Range    Specimen Description Vagina     Wet Prep No Trichomonas seen     Wet Prep No clue cells seen     Wet Prep No yeast seen     Wet Prep Many  WBC'S seen              Assessment & Plan     1. Acute cystitis with hematuria  Hx of recurrent UTI with neg urology work-up. None recent in the past 1 year.  Begin antibiotics. Push fluids. UC pending.   - sulfamethoxazole-trimethoprim (BACTRIM DS) 800-160 MG tablet; Take 1 tablet by mouth 2 times daily for 3 days  Dispense: 6 tablet; Refill: 0    2. Dysuria  - *UA reflex to Microscopic and Culture ([facility] (except [place] and [place])  - Urine Microscopic  - Wet prep  - Urine Culture Aerobic Bacterial    3. Urinary frequency  - *UA reflex to Microscopic and Culture ([facility] (except [place] and [place])  - Urine Culture Aerobic Bacterial       Patient Instructions   Begin antibiotics.  Push fluids.  I will keep you posted on the urine culture results.Patient Education     Bladder Infection, Female (Adult)    Urine is normally doesn't have any bacteria in it. But bacteria can get into the urinary tract from the skin around the rectum. Or they can travel in the blood from elsewhere in the body. Once they are in your urinary tract, they can cause infection in the  "urethra (urethritis), the bladder (cystitis), or the kidneys (pyelonephritis).  The most common place for an infection is in the bladder. This is called a bladder infection. This is one of the most common infections in women. Most bladder infections are easily treated. They are not serious unless the infection spreads to the kidney.  The phrases \"bladder infection,\" \"UTI,\" and \"cystitis\" are often used to describe the same thing. But they are not always the same. Cystitis is an inflammation of the bladder. The most common cause of cystitis is an infection.  Symptoms  The infection causes inflammation in the urethra and bladder. This causes many of the symptoms. The most common symptoms of a bladder infection are:    Pain or burning when urinating    Having to urinate more often than usual    Urgent need to urinate    Only a small amount of urine comes out    Blood in urine    Abdominal discomfort. This is usually in the lower abdomen above the pubic bone.    Cloudy urine    Strong- or bad-smelling urine    Unable to urinate (urinary retention)    Unable to hold urine in (urinary incontinence)    Fever    Loss of appetite    Confusion (in older adults)  Causes  Bladder infections are not contagious. You can't get one from someone else, from a toilet seat, or from sharing a bath.  The most common cause of bladder infections is bacteria from the bowels. The bacteria get onto the skin around the opening of the urethra. From there, they can get into the urine and travel up to the bladder, causing inflammation and infection. This usually happens because of:    Wiping improperly after urinating. Always wipe from front to back.    Bowel incontinence    Pregnancy    Procedures such as having a catheter inserted    Older age    Not emptying your bladder. This can allow bacteria a chance to grow in your urine.    Dehydration    Constipation    Sex    Use of a diaphragm for birth control   Treatment  Bladder infections are " diagnosed by a urine test. They are treated with antibiotics and usually clear up quickly without complications. Treatment helps prevent a more serious kidney infection.  Medicines  Medicines can help in the treatment of a bladder infection:    Take antibiotics until they are used up, even if you feel better. It is important to finish them to make sure the infection has cleared.    You can use acetaminophen or ibuprofen for pain, fever, or discomfort, unless another medicine was prescribed. If you have chronic liver or kidney disease, talk with your healthcare provider before using these medicines. Also talk with your provider if you've ever had a stomach ulcer or gastrointestinal bleeding, or are taking blood-thinner medicines.    If you are given phenazopydridine to reduce burning with urination, it will cause your urine to become a bright orange color. This can stain clothing.  Care and prevention  These self-care steps can help prevent future infections:    Drink plenty of fluids to prevent dehydration and flush out your bladder. Do this unless you must restrict fluids for other health reasons, or your doctor told you not to.    Proper cleaning after going to the bathroom is important. Wipe from front to back after using the toilet to prevent the spread of bacteria.    Urinate more often. Don't try to hold urine in for a long time.    Wear loose-fitting clothes and cotton underwear. Avoid tight-fitting pants.    Improve your diet and prevent constipation. Eat more fresh fruit and vegetables, and fiber, and less junk and fatty foods.    Avoid sex until your symptoms are gone.    Avoid caffeine, alcohol, and spicy foods. These can irritate your bladder.    Urinate right after intercourse to flush out your bladder.    If you use birth control pills and have frequent bladder infections, discuss it with your doctor.  Follow-up care  Call your healthcare provider if all symptoms are not gone after 3 days of treatment.  This is especially important if you have repeat infections.  If a culture was done, you will be told if your treatment needs to be changed. If directed, you can call to find out the results.  If X-rays were done, you will be told if the results will affect your treatment.  Call 911  Call 911 if any of the following occur:    Trouble breathing    Hard to wake up or confusion    Fainting or loss of consciousness    Rapid heart rate  When to seek medical advice  Call your healthcare provider right away if any of these occur:    Fever of 100.4 F (38.0 C) or higher, or as directed by your healthcare provider    Symptoms are not better by the third day of treatment    Back or belly (abdominal) pain that gets worse    Repeated vomiting, or unable to keep medicine down    Weakness or dizziness    Vaginal discharge    Pain, redness, or swelling in the outer vaginal area (labia)  Date Last Reviewed: 10/1/2016    2200-1609 [employer]. [address] [place] [address]. All rights reserved. This information is not intended as a substitute for professional medical care. Always follow your healthcare professional's instructions.               Return in about 3 days (around 2/20/2020), or if symptoms worsen or fail to improve.    Stefania Hoffman NP  [facility], [place]

## 2020-02-18 LAB
BACTERIA SPEC CULT: NORMAL
SPECIMEN SOURCE: NORMAL

## 2020-07-15 NOTE — PROGRESS NOTES
SUBJECTIVE:                                                    Shaye Brian is a 56 year old female who presents to clinic today for the following health issues:      RESPIRATORY SYMPTOMS      Duration: x4 days    Description  nasal congestion, rhinorrhea, facial pain/pressure, cough, fever, headache, fatigue/malaise, myalgias Dizzy, Cx congestion    Severity: severe    Accompanying signs and symptoms: Jaw pain, Head congestion    History (predisposing factors):  none    Precipitating or alleviating factors: None    Therapies tried and outcome:  guaifenesin     Past Medical History:   Diagnosis Date     Fibroids      Current Outpatient Prescriptions   Medication Sig Dispense Refill     cetirizine-psuedoePHEDrine (ZYRTEC-D) 5-120 MG per 12 hr tablet Take 1 tablet by mouth 2 times daily 180 tablet 3     nystatin-triamcinolone (MYCOLOG) ointment Apply topically 2 times daily 30 g 1     cyclobenzaprine (FLEXERIL) 10 MG tablet Take 0.5-1 tablets (5-10 mg) by mouth 3 times daily as needed for muscle spasms 30 tablet 5     ibuprofen 200 MG capsule Take 200 mg by mouth every 4 hours as needed for fever 120 capsule      diphenhydrAMINE (BENADRYL) 25 MG tablet Take 1-2 tablets by mouth every 6 hours as needed for itching. 60 tablet 1     Social History   Substance Use Topics     Smoking status: Never Smoker     Smokeless tobacco: Never Used     Alcohol use 0.0 oz/week     0 Standard drinks or equivalent per week      Comment: socially -once every one month      ROS:  Review of systems negative except as stated above.    OBJECTIVE  :/73 (BP Location: Right arm, Patient Position: Chair, Cuff Size: Adult Large)  Pulse 80  Temp 98.3  F (36.8  C) (Oral)  LMP 12/05/2013  SpO2 94%     GENERAL APPEARANCE: healthy, alert and no distress  EYES: EOMI,  PERRL, conjunctiva clear  HENT: ear canals and TM's normal.  Nose and mouth without ulcers, erythema or lesions  NECK: supple, nontender, no lymphadenopathy  RESP: lungs  clear to auscultation - no rales, rhonchi or wheezes  CV: regular rates and rhythm, normal S1 S2, no murmur noted  NEURO: Normal strength and tone, sensory exam grossly normal,  normal speech and mentation  SKIN: no suspicious lesions or rashes    ASSESSMENT:  Influenza    PLAN:  tx window.    OTC cough suppressant/expectorant, OTC decongestant/antihistamine and Rx tamiflu  See orders in Epic     patient presented with symptomatic orthostatic hypotension from supine to sitting on edge of bed/unable to perform

## 2020-10-10 ENCOUNTER — TRANSFERRED RECORDS (OUTPATIENT)
Dept: HEALTH INFORMATION MANAGEMENT | Facility: CLINIC | Age: 60
End: 2020-10-10

## 2020-11-29 ENCOUNTER — HEALTH MAINTENANCE LETTER (OUTPATIENT)
Age: 60
End: 2020-11-29

## 2020-12-11 ENCOUNTER — TRANSFERRED RECORDS (OUTPATIENT)
Dept: HEALTH INFORMATION MANAGEMENT | Facility: CLINIC | Age: 60
End: 2020-12-11
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
## 2021-01-25 ENCOUNTER — HOSPITAL ENCOUNTER (OUTPATIENT)
Dept: MAMMOGRAPHY | Facility: CLINIC | Age: 61
Discharge: HOME | End: 2021-01-25
Attending: FAMILY MEDICINE | Admitting: FAMILY MEDICINE
Payer: COMMERCIAL

## 2021-01-25 DIAGNOSIS — Z12.31 VISIT FOR SCREENING MAMMOGRAM: ICD-10-CM

## 2021-01-25 PROCEDURE — 77067 SCR MAMMO BI INCL CAD: CPT

## 2021-04-08 ENCOUNTER — IMMUNIZATION (OUTPATIENT)
Dept: NURSING | Facility: CLINIC | Age: 61
End: 2021-04-08
Payer: OTHER GOVERNMENT

## 2021-04-08 PROCEDURE — 91300 PR COVID VAC PFIZER DIL RECON 30 MCG/0.3 ML IM: CPT

## 2021-04-08 PROCEDURE — 0001A PR COVID VAC PFIZER DIL RECON 30 MCG/0.3 ML IM: CPT

## 2021-04-10 ENCOUNTER — HEALTH MAINTENANCE LETTER (OUTPATIENT)
Age: 61
End: 2021-04-10

## 2021-04-29 ENCOUNTER — IMMUNIZATION (OUTPATIENT)
Dept: NURSING | Facility: CLINIC | Age: 61
End: 2021-04-29
Attending: INTERNAL MEDICINE
Payer: OTHER GOVERNMENT

## 2021-04-29 PROCEDURE — 91300 PR COVID VAC PFIZER DIL RECON 30 MCG/0.3 ML IM: CPT

## 2021-04-29 PROCEDURE — 0002A PR COVID VAC PFIZER DIL RECON 30 MCG/0.3 ML IM: CPT

## 2021-06-08 ENCOUNTER — OFFICE VISIT (OUTPATIENT)
Dept: FAMILY MEDICINE | Facility: CLINIC | Age: 61
End: 2021-06-08

## 2021-06-08 ENCOUNTER — ANCILLARY PROCEDURE (OUTPATIENT)
Dept: GENERAL RADIOLOGY | Facility: CLINIC | Age: 61
End: 2021-06-08
Attending: FAMILY MEDICINE

## 2021-06-08 VITALS
BODY MASS INDEX: 33.53 KG/M2 | HEART RATE: 100 BPM | TEMPERATURE: 98.1 F | SYSTOLIC BLOOD PRESSURE: 118 MMHG | HEIGHT: 67 IN | DIASTOLIC BLOOD PRESSURE: 70 MMHG | RESPIRATION RATE: 18 BRPM | WEIGHT: 213.6 LBS | OXYGEN SATURATION: 97 %

## 2021-06-08 DIAGNOSIS — M25.551 HIP PAIN, RIGHT: ICD-10-CM

## 2021-06-08 DIAGNOSIS — M25.551 HIP PAIN, RIGHT: Primary | ICD-10-CM

## 2021-06-08 PROCEDURE — 73502 X-RAY EXAM HIP UNI 2-3 VIEWS: CPT | Mod: TC | Performed by: RADIOLOGY

## 2021-06-08 PROCEDURE — 99214 OFFICE O/P EST MOD 30 MIN: CPT | Performed by: FAMILY MEDICINE

## 2021-06-08 ASSESSMENT — MIFFLIN-ST. JEOR: SCORE: 1571.51

## 2021-06-08 NOTE — PROGRESS NOTES
"    {PROVIDER CHARTING PREFERENCE:122866}    Subjective   Shaye is a 60 year old who presents for the following health issues {ACCOMPANIED BY STATEMENT (Optional):186449}    HPI     Back Pain  Onset/Duration: Ongoing for One Month  Description:   Location of pain: {.:912293}  Character of pain: {.:707582}  Pain radiation: {.:433013::\"none\"}  New numbness or weakness in legs, not attributed to pain: { :183502}  Intensity: {.:513965::\"Currently ***/10\"}  Progression of Symptoms: {.:541397}  History:   Specific cause: {.:250759::\"none\"}  Pain interferes with job: {.:118047::\" no \"}  History of back problems: {.:204609::\"no prior back problems\"}  Any previous MRI or X-rays: {.:415144::\"None\"}  Sees a specialist for back pain: { :727231::\"No\"}  Alleviating factors:   Improved by: {.:204666}    Precipitating factors:  Worsened by: {.:190087::\"Nothing\"}  Therapies tried and outcome: {.:480941}    Accompanying Signs & Symptoms:  Risk of Fracture: {.:556390::\"None\"}  Risk of Cauda Equina: {.:400788::\"None\"}  Risk of Infection: {.:083728::\"None\"}  Risk of Cancer: {.:261103::\"None\"}  Risk of Ankylosing Spondylitis: Onset at age <35, male, AND morning back stiffness {.:581160::\" no \"}    {TIP  If yes to any of the last 5 items or sudden/progressive weakness, consider imaging and/or surgical referral, if not already done :774976}  {TIP  When medically safe- First line medication for acute LBP: Acetaminophen, Second line: NSAIDS, muscle relaxants, and consider gabapentin for radiculopathy; opioids are usually not needed.  Can add <dot>pilbp in patient instructions :512616}  {additonal problems for provider to add (Optional):985448}    Review of Systems   {ROS COMP (Optional):027841}      Objective    LMP 12/05/2013   There is no height or weight on file to calculate BMI.  Physical Exam   {Exam List (Optional):599405}    {Diagnostic Test Results (Optional):283666}    {AMBULATORY ATTESTATION (Optional):949182}        "

## 2021-06-08 NOTE — PROGRESS NOTES
Assessment & Plan     Hip pain, right - based on right hip XR, she seems to have some decent OA. Encouraged follow up with PT, can use ibuprofen BID for now. If not improved in 1 month, should meet with sports med  - XR Hip Left 2-3 Views; Future  - XR Hip Right 2-3 Views; Future  - Orthopedic & Spine  Referral; Future  - [facility] REFERRAL; Future    Return in about 8 months (around 2/8/2022) for Yearly Preventive Exam.    Keyla Reed MD  [facility]      Tyron Cr is a 60 year old who presents for the following health issues     Musculoskeletal Problem    History of Present Illness       She eats 2-3 servings of fruits and vegetables daily.She consumes 0 sweetened beverage(s) daily.She exercises with enough effort to increase her heart rate 20 to 29 minutes per day.  She exercises with enough effort to increase her heart rate 3 or less days per week.   She is taking medications regularly.       Right Hip Pain  Onset/Duration: Ongoing for One Month-Gradual Onset  Description  Location: Right Buttock, Right Hip (lateral and anterior)  Joint Swelling: no  Redness: no  Pain: YES- Dull Ache at Night and sitting still. Sharp upon movement.   Warmth: no  Intensity:  5/10 Some days are better than others.   Progression of Symptoms:  worsening  Accompanying signs and symptoms:   Fevers: no  Numbness/tingling/weakness: YES- Weakness in Right Leg  History  Trauma to the area: no  Recent illness:  no  Previous similar problem: no  Previous evaluation:  no  Precipitating or alleviating factors:  Aggravating factors include: Walking  Therapies tried and outcome: Taking Ibuprofen and 800 mg has been helpful for the pain.     Having anterior hip pain at times. Feels like she is weak in her quad at times. Also having lateral hip pain and posterior hip and buttock pain at times. Pain in the buttock prevents her from sleeping well.     No radiation of symptoms down the leg,  "although she reports right knee pain since she's been walking more gingerly with hip pain.     Hip pain began when she was walking more around uneven terrain.      No loss of bowel or bladder.       Review of Systems   Constitutional, HEENT, cardiovascular, pulmonary, gi and gu systems are negative, except as otherwise noted.      Objective    /70 (BP Location: Right arm, Patient Position: Chair, Cuff Size: Adult Regular)   Pulse 100   Temp 98.1  F (36.7  C) (Oral)   Resp 18   Ht 1.702 m (5' 7\")   Wt 96.9 kg (213 lb 9.6 oz)   LMP 12/05/2013   SpO2 97%   BMI 33.45 kg/m    Body mass index is 33.45 kg/m .  Physical Exam   GENERAL: healthy, alert and no distress  MS: right hip - lateral pain with internal and external rotation, not ttp over the troch bursa, negative LORENZO SIERRA  NEURO: 5/5 strength in all mm groups BLEs, normal sensation in all nerve distributions    Xray - Reviewed and interpreted by me.  Right hip - mild to moderate DJD            "

## 2021-06-17 ENCOUNTER — MYC MEDICAL ADVICE (OUTPATIENT)
Dept: FAMILY MEDICINE | Facility: CLINIC | Age: 61
End: 2021-06-17

## 2021-06-17 DIAGNOSIS — M25.551 HIP PAIN, RIGHT: Primary | ICD-10-CM

## 2021-06-17 NOTE — TELEPHONE ENCOUNTER
Replied to patient MyChart message to inform her of the referral and whether she wishes to pick it up in clinic or have it mailed.     ~Apolonia TSANG,

## 2021-06-17 NOTE — TELEPHONE ENCOUNTER
Physical therapy order placed. Please print and provide to patient.     If she is self pay, she doesn't need order/referral to sports med. They order injection, not me.     JH

## 2021-09-19 ENCOUNTER — HEALTH MAINTENANCE LETTER (OUTPATIENT)
Age: 61
End: 2021-09-19

## 2021-11-03 ENCOUNTER — DOCUMENTATION ONLY (OUTPATIENT)
Dept: OTHER | Facility: CLINIC | Age: 61
End: 2021-11-03

## 2022-01-17 ENCOUNTER — TRANSFERRED RECORDS (OUTPATIENT)
Dept: HEALTH INFORMATION MANAGEMENT | Facility: CLINIC | Age: 62
End: 2022-01-17

## 2022-01-21 ENCOUNTER — OFFICE VISIT (OUTPATIENT)
Dept: FAMILY MEDICINE | Facility: CLINIC | Age: 62
End: 2022-01-21

## 2022-01-21 VITALS
HEART RATE: 83 BPM | HEIGHT: 68 IN | BODY MASS INDEX: 32.99 KG/M2 | RESPIRATION RATE: 20 BRPM | SYSTOLIC BLOOD PRESSURE: 118 MMHG | WEIGHT: 217.7 LBS | OXYGEN SATURATION: 96 % | TEMPERATURE: 98.5 F | DIASTOLIC BLOOD PRESSURE: 76 MMHG

## 2022-01-21 DIAGNOSIS — M25.551 HIP PAIN, RIGHT: ICD-10-CM

## 2022-01-21 DIAGNOSIS — H60.543 DERMATITIS OF BOTH EAR CANALS: Primary | ICD-10-CM

## 2022-01-21 PROCEDURE — 99214 OFFICE O/P EST MOD 30 MIN: CPT | Performed by: FAMILY MEDICINE

## 2022-01-21 RX ORDER — TRIAMCINOLONE ACETONIDE 5 MG/G
1 OINTMENT TOPICAL 2 TIMES DAILY
Qty: 15 G | Refills: 3 | Status: SHIPPED | OUTPATIENT
Start: 2022-01-21 | End: 2022-01-31

## 2022-01-21 ASSESSMENT — MIFFLIN-ST. JEOR: SCORE: 1600.98

## 2022-01-21 NOTE — PROGRESS NOTES
"  Assessment & Plan     Dermatitis of both ear canals - will treat with topical steroid for 7-14 days, advised maintain with topical ointment to avoid recurrence.   - triamcinolone (KENALOG) 0.5 % external ointment; Apply 1 g topically 2 times daily for 10 days    Hip pain, right - ongoing issue. She is using high dose OTCs, suggested hip surgery consultation to discuss alternative options.   - Orthopedic  Referral; Future      Return in about 6 months (around 7/21/2022) for Yearly Preventive Exam.    Keyla Reed MD  [facility] 61 [facility] who presents for the following health issues     History of Present Illness       She eats 2-3 servings of fruits and vegetables daily.She consumes 0 sweetened beverage(s) daily.She exercises with enough effort to increase her heart rate 10 to 19 minutes per day.  She exercises with enough effort to increase her heart rate 3 or less days per week.   She is taking medications regularly.       Persistent left ear itching, present for years. Recently, itched and created a sore. Noted some post auricular pain following this, then pain seemed to spread down the left neck.     Right hip pain since May 2021. Has been using apap and ibuprofen with some relief. Worries about taking high doses for long period of time. Wonders what her other options would be.    Review of Systems   Constitutional, HEENT, cardiovascular, pulmonary, gi and gu systems are negative, except as otherwise noted.      Objective    /76   Pulse 83   Temp 98.5  F (36.9  C) (Oral)   Resp 20   Ht 1.727 m (5' 8\")   Wt 98.7 kg (217 lb 11.2 oz)   LMP 12/05/2013   SpO2 96%   BMI 33.10 kg/m    Body mass index is 33.1 kg/m .  Physical Exam   GENERAL: healthy, alert and no distress  HENT: lichenified skin left EAC, no erythema, 0.3 cm skin abrasion 6 o'clock in EAC, right ear clear  NECK: no adenopathy  RESP: lungs clear to auscultation - no " rales, rhonchi or wheezes  CV: regular rate and rhythm, normal S1 S2, no S3 or S4, no murmur  PSYCH: mentation appears normal, affect normal/bright      Keyla Reed MD  [facility]

## 2022-03-04 ENCOUNTER — OFFICE VISIT (OUTPATIENT)
Dept: URGENT CARE | Facility: URGENT CARE | Age: 62
End: 2022-03-04

## 2022-03-04 VITALS
TEMPERATURE: 98.7 F | SYSTOLIC BLOOD PRESSURE: 112 MMHG | HEART RATE: 90 BPM | DIASTOLIC BLOOD PRESSURE: 62 MMHG | OXYGEN SATURATION: 97 %

## 2022-03-04 DIAGNOSIS — J20.9 ACUTE BRONCHITIS, UNSPECIFIED ORGANISM: Primary | ICD-10-CM

## 2022-03-04 DIAGNOSIS — R05.9 COUGH: ICD-10-CM

## 2022-03-04 PROCEDURE — 99213 OFFICE O/P EST LOW 20 MIN: CPT | Performed by: FAMILY MEDICINE

## 2022-03-04 RX ORDER — BENZONATATE 100 MG/1
200 CAPSULE ORAL 3 TIMES DAILY PRN
Qty: 42 CAPSULE | Refills: 3 | Status: SHIPPED | OUTPATIENT
Start: 2022-03-04 | End: 2022-08-16

## 2022-03-04 RX ORDER — OMEGA-3 FATTY ACIDS/FISH OIL 300-1000MG
200 CAPSULE ORAL EVERY 4 HOURS PRN
COMMUNITY
End: 2023-05-14

## 2022-03-04 RX ORDER — ACETAMINOPHEN 325 MG/1
325-650 TABLET ORAL EVERY 6 HOURS PRN
COMMUNITY
End: 2023-05-14

## 2022-03-04 NOTE — PATIENT INSTRUCTIONS
Take over the counter cough syrup (with dextromethorphan and/or Guaifenesin).  Guaifenesin is the generic ingredient found in Mucinex to thin thick phlegm.      Use a room humidifier    Drink plenty of liquids    Go to the emergency room if you develop worsening, severe shortness of breath.      follow up if not better in 7-10 days.

## 2022-03-04 NOTE — PROGRESS NOTES
SUBJECTIVE:   Shaye Brian is a 61 year old female presenting with a chief complaint of cough (dry cough for the first 3-4 days, but now productive of white phlegm).   .  Onset of symptoms was one week ago.  Course of illness is persisting.  .    Current and Associated symptoms: fatigue.    About two weeks ago, she started experiencing stuffy nose, sore throat, mild cough.    Predisposing factors include She attended a shower and other people had a sore throat and a head cold and their symptoms have improved.      No shortness of breath.  She an still sleep overnight.      She received the second Pfizer COVID-19 vaccine dose on April 29, 2021. She has not received the booster dose.  .      Her February 24, 2022, COVID-19 PCR test from [facility] was negative.     Past Medical History:   Diagnosis Date     Mumps      Current Outpatient Medications   Medication Sig Dispense Refill     acetaminophen (TYLENOL) 325 MG tablet Take 325-650 mg by mouth every 6 hours as needed for mild pain       ibuprofen (ADVIL/MOTRIN) 200 MG capsule Take 200 mg by mouth every 4 hours as needed for fever       aspirin-acetaminophen-caffeine (EXCEDRIN MIGRAINE) 250-250-65 MG per tablet Take 1 tablet by mouth (Patient not taking: Reported on 3/4/2022)       Cetirizine-Pseudoephedrine (ZYRTEC-D PO)  (Patient not taking: Reported on 3/4/2022)       diphenhydrAMINE HCl (BENADRYL ALLERGY PO) Take by mouth At Bedtime (Patient not taking: Reported on 3/4/2022)       Social History     Tobacco Use     Smoking status: Never Smoker     Smokeless tobacco: Never Used   Substance Use Topics     Alcohol use: Yes     Alcohol/week: 0.0 standard drinks     Comment: socially -once every one month        ROS:  CONSTITUTIONAL:NEGATIVE  for fevers.   ENT/MOUTH:  Positive for recent nasal congestion, sore throat (now improving).    RESP: positive for cough.     OBJECTIVE:  /62   Pulse 90   Temp 98.7  F (37.1  C) (Tympanic)   LMP 12/05/2013   SpO2 97%    Breastfeeding No   GENERAL APPEARANCE: healthy, alert and no distress.  There are some dry coughing attacks present.   HENT: nasal turbinates erythematous, swollen and oral mucous membranes moist, no erythema noted  NECK: supple, nontender, no lymphadenopathy  RESP: lungs clear to auscultation - no rales, rhonchi or wheezes  CV: regular rates and rhythm, normal S1 S2, no murmur noted  SKIN: no suspicious lesions or rashes.  No cyanosis/pallor/diaphoresis.        ASSESSMENT:  Cough  Acute Bronchhitis    PLAN:  Humidifier  over the counter cough syrup  Rx:  Tessalon Perles  follow up if not better in 7-10 days.   Go to the emergency room if experiencing worsening, severe shortness of breath.      Kevin Ellison MD

## 2022-03-29 ENCOUNTER — NURSE TRIAGE (OUTPATIENT)
Dept: NURSING | Facility: CLINIC | Age: 62
End: 2022-03-29

## 2022-03-30 ENCOUNTER — ALLIED HEALTH/NURSE VISIT (OUTPATIENT)
Dept: FAMILY MEDICINE | Facility: CLINIC | Age: 62
End: 2022-03-30

## 2022-03-30 DIAGNOSIS — Z23 NEED FOR TDAP VACCINATION: Primary | ICD-10-CM

## 2022-03-30 PROCEDURE — 90715 TDAP VACCINE 7 YRS/> IM: CPT

## 2022-03-30 PROCEDURE — 90471 IMMUNIZATION ADMIN: CPT

## 2022-03-30 PROCEDURE — 99207 PR NO CHARGE NURSE ONLY: CPT

## 2022-03-30 NOTE — PROGRESS NOTES
Prior to immunization administration, verified patients identity using patient s name and date of birth. Please see Immunization Activity for additional information.     Screening Questionnaire for Adult Immunization    Are you sick today?   No   Do you have allergies to medications, food, a vaccine component or latex?   No   Have you ever had a serious reaction after receiving a vaccination?   No   Do you have a long-term health problem with heart, lung, kidney, or metabolic disease (e.g., diabetes), asthma, a blood disorder, no spleen, complement component deficiency, a cochlear implant, or a spinal fluid leak?  Are you on long-term aspirin therapy?   No   Do you have cancer, leukemia, HIV/AIDS, or any other immune system problem?   No   Do you have a parent, brother, or sister with an immune system problem?   No   In the past 3 months, have you taken medications that affect  your immune system, such as prednisone, other steroids, or anticancer drugs; drugs for the treatment of rheumatoid arthritis, Crohn s disease, or psoriasis; or have you had radiation treatments?   No   Have you had a seizure, or a brain or other nervous system problem?   No   During the past year, have you received a transfusion of blood or blood    products, or been given immune (gamma) globulin or antiviral drug?   No   For women: Are you pregnant or is there a chance you could become       pregnant during the next month?   No   Have you received any vaccinations in the past 4 weeks?   No     Immunization questionnaire answers were all negative.        Per orders of Dr. Reed, injection of Tdap given by Deepthi Ferraro. Patient instructed to remain in clinic for 15 minutes afterwards, and to report any adverse reaction to me immediately.       Screening performed by Deepthi Ferraro on 3/30/2022 at 7:38 AM.

## 2022-03-30 NOTE — TELEPHONE ENCOUNTER
"When opening a lock box or door earlier today, she remembers scratching herself. While making dinner tonight, she found her right hand was bleeding. The scratch is located on the top of the hand between index finger and thumb. She cleaned it. It is no longer bleeding. She is wondering about her last tetanus vaccination-- it was not a deep wound, but she wants to prevent tetanus because she thinks it may have been a \"dirty wound\". .   Her last tetanus vaccination was 5/15/2013.   Triaged to a disposition of see provider within the next 3 days. Discussed option of [facility] ([place] location information given). Home care given. Call transferred to .    Kaitlyn Mendosa RN Triage Nurse Advisor 7:33 PM 3/29/2022  Reason for Disposition    [1] Last tetanus shot > 5 years ago AND [2] DIRTY cut or scrape    Additional Information    Negative: [1] Major bleeding (e.g., actively dripping or spurting) AND [2] can't be stopped    Negative: Amputation    Negative: Shock suspected (e.g., cold/pale/clammy skin, too weak to stand, low BP, rapid pulse)    Negative: Sounds like a life-threatening emergency to the triager    Negative: [1] Animal bite AND [2] broken skin    Negative: [1] Human bite AND [2] broken skin    Negative: Injury is a puncture wound    Negative: Foreign body in skin (e.g., splinter, sliver)    Negative: Bruises not from an injury    Negative: Wound looks infected    Negative: Electrical burn    Negative: Chemical burn    Negative: Thermal burn    Negative: [1] Bleeding AND [2] won't stop after 10 minutes of direct pressure (using correct technique)    Negative: Skin is split open or gaping (or length > 1/2 inch or 12 mm on the skin, 1/4 inch or 6 mm on the face)    Negative: [1] Deep cut AND [2] can see bone or tendons    Negative: [1] Dirt in the wound AND [2] not removed with 15 minutes of scrubbing    Negative: Skin loss involves more than 10% of surface area (Note: the palm of the hand = 1%)    Negative: " High pressure injection injury (e.g., from paint gun, usually work-related)    Negative: Wound causes numbness (i.e., loss of sensation)    Negative: Wound causes weakness (i.e., decreased ability to move hand, finger, toe)    Negative: Sounds like a serious injury to the triager    Negative: [1] SEVERE pain AND [2] not improved 2 hours after pain medicine/ice packs    Negative: [1] Looks infected AND [2] large red area or streak (>2 inches or 5 cm)    Negative: [1] Fever AND [2] bright red area or streak    Negative: Suspicious history for the injury    Negative: [1] Raised bruise AND [2] size > 2 inches (5 cm) AND [3] expanding    Negative: [1] Looks infected (spreading redness, pus) AND [2] no fever    Negative: No prior tetanus shots (or is not fully vaccinated)    Negative: [1] HIV positive or severe immunodeficiency (severely weak immune system) AND [2] DIRTY cut or scrape    Protocols used: SKIN INJURY-A-AH  COVID 19 Nurse Triage Plan/Patient Instructions    Please be aware that novel coronavirus (COVID-19) may be circulating in the community. If you develop symptoms such as fever, cough, or SOB or if you have concerns about the presence of another infection including coronavirus (COVID-19), please contact your health care provider or visit [email or web address].     Disposition/Instructions    In-Person Visit with provider recommended. Reference Visit Selection Guide.    Thank you for taking steps to prevent the spread of this virus.  o Limit your contact with others.  o Wear a simple mask to cover your cough.  o Wash your hands well and often.    Resources    [facility]: About COVID-19: [email or web address]    CDC: What to Do If You're Sick: www.cdc.gov/coronavirus/2019-ncov/about/steps-when-sick.html    CDC: Ending Home Isolation: www.cdc.gov/coronavirus/2019-ncov/hcp/disposition-in-home-patients.html     CDC: Caring for Someone:  www.cdc.gov/coronavirus/2019-ncov/if-you-are-sick/care-for-someone.html     [facility]: Interim Guidance for Hospital Discharge to Home: www.health.[facility].[place]/diseases/coronavirus/hcp/hospdischarge.pdf    [facility] clinical trials (COVID-19 research studies): [email or web address]-clinical-trials     Below are the COVID-19 hotlines at the [place] [facility] ([facility]). Interpreters are available.   o For health questions: Call 407-800-4621 or 1-559.403.4623 (7 a.m. to 7 p.m.)  o For questions about schools and childcare: Call 788-014-6558 or 1-483.369.4074 (7 a.m. to 7 p.m.)

## 2022-05-01 ENCOUNTER — HEALTH MAINTENANCE LETTER (OUTPATIENT)
Age: 62
End: 2022-05-01

## 2022-06-06 ENCOUNTER — TRANSFERRED RECORDS (OUTPATIENT)
Dept: HEALTH INFORMATION MANAGEMENT | Facility: CLINIC | Age: 62
End: 2022-06-06

## 2022-07-25 ENCOUNTER — APPOINTMENT (OUTPATIENT)
Dept: URBAN - METROPOLITAN AREA CLINIC 253 | Age: 62
Setting detail: DERMATOLOGY
End: 2022-07-25

## 2022-07-25 VITALS — HEIGHT: 69 IN | WEIGHT: 215 LBS

## 2022-07-25 DIAGNOSIS — L57.0 ACTINIC KERATOSIS: ICD-10-CM

## 2022-07-25 DIAGNOSIS — D22 MELANOCYTIC NEVI: ICD-10-CM

## 2022-07-25 DIAGNOSIS — L57.8 OTHER SKIN CHANGES DUE TO CHRONIC EXPOSURE TO NONIONIZING RADIATION: ICD-10-CM

## 2022-07-25 DIAGNOSIS — Z85.828 PERSONAL HISTORY OF OTHER MALIGNANT NEOPLASM OF SKIN: ICD-10-CM

## 2022-07-25 DIAGNOSIS — M71 OTHER BURSOPATHIES: ICD-10-CM

## 2022-07-25 DIAGNOSIS — D18.0 HEMANGIOMA: ICD-10-CM

## 2022-07-25 DIAGNOSIS — Z71.89 OTHER SPECIFIED COUNSELING: ICD-10-CM

## 2022-07-25 DIAGNOSIS — L82.1 OTHER SEBORRHEIC KERATOSIS: ICD-10-CM

## 2022-07-25 DIAGNOSIS — D49.2 NEOPLASM OF UNSPECIFIED BEHAVIOR OF BONE, SOFT TISSUE, AND SKIN: ICD-10-CM

## 2022-07-25 PROBLEM — M71.372 OTHER BURSAL CYST, LEFT ANKLE AND FOOT: Status: ACTIVE | Noted: 2022-07-25

## 2022-07-25 PROBLEM — D22.5 MELANOCYTIC NEVI OF TRUNK: Status: ACTIVE | Noted: 2022-07-25

## 2022-07-25 PROBLEM — D18.01 HEMANGIOMA OF SKIN AND SUBCUTANEOUS TISSUE: Status: ACTIVE | Noted: 2022-07-25

## 2022-07-25 PROCEDURE — 17003 DESTRUCT PREMALG LES 2-14: CPT

## 2022-07-25 PROCEDURE — OTHER LIQUID NITROGEN: OTHER

## 2022-07-25 PROCEDURE — OTHER COUNSELING: OTHER

## 2022-07-25 PROCEDURE — 99203 OFFICE O/P NEW LOW 30 MIN: CPT | Mod: 25

## 2022-07-25 PROCEDURE — 11103 TANGNTL BX SKIN EA SEP/ADDL: CPT

## 2022-07-25 PROCEDURE — OTHER BIOPSY BY SHAVE METHOD: OTHER

## 2022-07-25 PROCEDURE — 17000 DESTRUCT PREMALG LESION: CPT | Mod: 59

## 2022-07-25 PROCEDURE — OTHER MIPS QUALITY: OTHER

## 2022-07-25 PROCEDURE — 11102 TANGNTL BX SKIN SINGLE LES: CPT

## 2022-07-25 ASSESSMENT — LOCATION DETAILED DESCRIPTION DERM
LOCATION DETAILED: LEFT PROXIMAL PRETIBIAL REGION
LOCATION DETAILED: RIGHT SUPERIOR LATERAL NECK
LOCATION DETAILED: LEFT POSTERIOR SHOULDER
LOCATION DETAILED: LEFT DISTAL POSTERIOR UPPER ARM
LOCATION DETAILED: RIGHT CENTRAL LATERAL NECK
LOCATION DETAILED: LEFT MEDIAL DORSAL FOOT
LOCATION DETAILED: LEFT CLAVICULAR SKIN
LOCATION DETAILED: LEFT ANTERIOR PROXIMAL THIGH
LOCATION DETAILED: SUBXIPHOID
LOCATION DETAILED: INFERIOR THORACIC SPINE
LOCATION DETAILED: RIGHT PROXIMAL PRETIBIAL REGION
LOCATION DETAILED: RIGHT DISTAL POSTERIOR UPPER ARM
LOCATION DETAILED: SUPERIOR THORACIC SPINE
LOCATION DETAILED: UPPER STERNUM
LOCATION DETAILED: RIGHT POSTERIOR SHOULDER
LOCATION DETAILED: RIGHT ANTERIOR PROXIMAL THIGH
LOCATION DETAILED: MIDDLE STERNUM

## 2022-07-25 ASSESSMENT — LOCATION ZONE DERM
LOCATION ZONE: ARM
LOCATION ZONE: LEG
LOCATION ZONE: FEET
LOCATION ZONE: NECK
LOCATION ZONE: TRUNK

## 2022-07-25 ASSESSMENT — LOCATION SIMPLE DESCRIPTION DERM
LOCATION SIMPLE: LEFT THIGH
LOCATION SIMPLE: RIGHT ANTERIOR NECK
LOCATION SIMPLE: LEFT FOOT
LOCATION SIMPLE: RIGHT POSTERIOR UPPER ARM
LOCATION SIMPLE: UPPER BACK
LOCATION SIMPLE: LEFT SHOULDER
LOCATION SIMPLE: LEFT PRETIBIAL REGION
LOCATION SIMPLE: RIGHT THIGH
LOCATION SIMPLE: LEFT CLAVICULAR SKIN
LOCATION SIMPLE: RIGHT PRETIBIAL REGION
LOCATION SIMPLE: LEFT POSTERIOR UPPER ARM
LOCATION SIMPLE: NECK
LOCATION SIMPLE: CHEST
LOCATION SIMPLE: ABDOMEN
LOCATION SIMPLE: RIGHT SHOULDER

## 2022-07-25 NOTE — HPI: FULL BODY SKIN EXAMINATION
How Severe Are Your Spot(S)?: mild
What Type Of Note Output Would You Prefer (Optional)?: Standard Output
What Is The Reason For Today's Visit?: Full Body Skin Examination
What Is The Reason For Today's Visit? (Being Monitored For X): the development of new lesions
Additional History: She has some rough spots on her chest, a changing lesion on her right posterior shoulder and a pink spot on her right neck. Her family members noticed changes on her shoulder on a family vacation in [place]. \\James has significant history of sun exposure and history of multiple skin cancers treated and another dermatology office years ago.

## 2022-07-25 NOTE — PROCEDURE: BIOPSY BY SHAVE METHOD
Render Path Notes In Note?: No
Hemostasis: Drysol
Information: Selecting Yes will display possible errors in your note based on the variables you have selected. This validation is only offered as a suggestion for you. PLEASE NOTE THAT THE VALIDATION TEXT WILL BE REMOVED WHEN YOU FINALIZE YOUR NOTE. IF YOU WANT TO FAX A PRELIMINARY NOTE YOU WILL NEED TO TOGGLE THIS TO 'NO' IF YOU DO NOT WANT IT IN YOUR FAXED NOTE.
Billing Type: Third-Party Bill
Additional Anesthesia Volume In Cc (Will Not Render If 0): 0
Dressing: bandage
Detail Level: Detailed
Render Post-Care Instructions In Note?: yes
Depth Of Biopsy: dermis
Path Notes (To The Dermatopathologist): Please confirm margins
Electrodesiccation Text: The wound bed was treated with electrodesiccation after the biopsy was performed.
Anesthesia Type: 2% lidocaine with epinephrine and a 1:10 solution of 8.4% sodium bicarbonate
Curettage Text: The wound bed was treated with curettage after the biopsy was performed.
Silver Nitrate Text: The wound bed was treated with silver nitrate after the biopsy was performed.
Cryotherapy Text: The wound bed was treated with cryotherapy after the biopsy was performed.
Biopsy Method: Dermablade
Notification Instructions: Patient will be notified of biopsy results. However, patient instructed to call the office if not contacted within 2 weeks.
Wound Care: Petrolatum
Electrodesiccation And Curettage Text: The wound bed was treated with electrodesiccation and curettage after the biopsy was performed.
Biopsy Type: H and E
Post-Care Instructions: I reviewed with the patient in detail post-care instructions. Patient is to keep the biopsy site dry overnight, and then apply bacitracin twice daily until healed. Patient may apply hydrogen peroxide soaks to remove any crusting.
Consent: Written consent was obtained and risks were reviewed including but not limited to scarring, infection, bleeding, scabbing, incomplete removal, nerve damage and allergy to anesthesia.
Anesthesia Volume In Cc (Will Not Render If 0): 0.3
Type Of Destruction Used: Curettage

## 2022-07-25 NOTE — PROCEDURE: COUNSELING
Detail Level: Generalized
Detail Level: Zone
Detail Level: Detailed
Mari Diaz)  HematologyOncology; Internal Medicine; Medical Oncology  [address] [place] [address]  Phone: [phone]  Fax: [phone]  Follow Up Time:

## 2022-08-10 ENCOUNTER — APPOINTMENT (OUTPATIENT)
Dept: URBAN - METROPOLITAN AREA CLINIC 253 | Age: 62
Setting detail: DERMATOLOGY
End: 2022-08-13

## 2022-08-10 VITALS — WEIGHT: 215 LBS | HEIGHT: 69 IN

## 2022-08-10 DIAGNOSIS — L56.4 POLYMORPHOUS LIGHT ERUPTION: ICD-10-CM

## 2022-08-10 PROBLEM — L30.9 DERMATITIS, UNSPECIFIED: Status: ACTIVE | Noted: 2022-08-10

## 2022-08-10 PROBLEM — D04.4 CARCINOMA IN SITU OF SKIN OF SCALP AND NECK: Status: ACTIVE | Noted: 2022-08-10

## 2022-08-10 PROCEDURE — OTHER MIPS QUALITY: OTHER

## 2022-08-10 PROCEDURE — OTHER ADDITIONAL NOTES: OTHER

## 2022-08-10 PROCEDURE — OTHER PRESCRIPTION: OTHER

## 2022-08-10 PROCEDURE — OTHER COUNSELING: OTHER

## 2022-08-10 RX ORDER — TRIAMCINOLONE ACETONIDE 1 MG/G
CREAM TOPICAL BID
Qty: 15 | Refills: 0 | Status: ERX | COMMUNITY
Start: 2022-08-10

## 2022-08-10 ASSESSMENT — LOCATION DETAILED DESCRIPTION DERM: LOCATION DETAILED: MIDDLE STERNUM

## 2022-08-10 ASSESSMENT — LOCATION SIMPLE DESCRIPTION DERM: LOCATION SIMPLE: CHEST

## 2022-08-10 ASSESSMENT — LOCATION ZONE DERM: LOCATION ZONE: TRUNK

## 2022-08-10 NOTE — PROCEDURE: ADDITIONAL NOTES
Detail Level: Detailed
Additional Notes: - Bx date 7/25/22\\n- Patient decided to proceed with MOHS or excision with WDT depending on pricing. Task sent to Dr. Garnica.
Render Risk Assessment In Note?: no

## 2022-08-16 ENCOUNTER — OFFICE VISIT (OUTPATIENT)
Dept: FAMILY MEDICINE | Facility: CLINIC | Age: 62
End: 2022-08-16

## 2022-08-16 ENCOUNTER — MYC MEDICAL ADVICE (OUTPATIENT)
Dept: FAMILY MEDICINE | Facility: CLINIC | Age: 62
End: 2022-08-16

## 2022-08-16 VITALS
HEART RATE: 76 BPM | HEIGHT: 67 IN | DIASTOLIC BLOOD PRESSURE: 79 MMHG | TEMPERATURE: 97 F | RESPIRATION RATE: 14 BRPM | BODY MASS INDEX: 33.9 KG/M2 | SYSTOLIC BLOOD PRESSURE: 131 MMHG | WEIGHT: 216 LBS

## 2022-08-16 DIAGNOSIS — R73.03 PREDIABETES: ICD-10-CM

## 2022-08-16 DIAGNOSIS — M16.11 PRIMARY OSTEOARTHRITIS OF RIGHT HIP: ICD-10-CM

## 2022-08-16 DIAGNOSIS — Z01.818 PREOP GENERAL PHYSICAL EXAM: Primary | ICD-10-CM

## 2022-08-16 DIAGNOSIS — Z90.710 HISTORY OF ROBOT-ASSISTED LAPAROSCOPIC HYSTERECTOMY: ICD-10-CM

## 2022-08-16 LAB
ERYTHROCYTE [DISTWIDTH] IN BLOOD BY AUTOMATED COUNT: 12.6 % (ref 10–15)
HBA1C MFR BLD: 6.2 % (ref 0–5.6)
HCT VFR BLD AUTO: 40 % (ref 35–47)
HGB BLD-MCNC: 13.7 G/DL (ref 11.7–15.7)
MCH RBC QN AUTO: 32.4 PG (ref 26.5–33)
MCHC RBC AUTO-ENTMCNC: 34.3 G/DL (ref 31.5–36.5)
MCV RBC AUTO: 95 FL (ref 78–100)
PLATELET # BLD AUTO: 383 10E3/UL (ref 150–450)
RBC # BLD AUTO: 4.23 10E6/UL (ref 3.8–5.2)
WBC # BLD AUTO: 7.2 10E3/UL (ref 4–11)

## 2022-08-16 PROCEDURE — 36415 COLL VENOUS BLD VENIPUNCTURE: CPT | Performed by: FAMILY MEDICINE

## 2022-08-16 PROCEDURE — 99214 OFFICE O/P EST MOD 30 MIN: CPT | Performed by: FAMILY MEDICINE

## 2022-08-16 PROCEDURE — 80048 BASIC METABOLIC PNL TOTAL CA: CPT | Performed by: FAMILY MEDICINE

## 2022-08-16 PROCEDURE — 83036 HEMOGLOBIN GLYCOSYLATED A1C: CPT | Performed by: FAMILY MEDICINE

## 2022-08-16 PROCEDURE — 85027 COMPLETE CBC AUTOMATED: CPT | Performed by: FAMILY MEDICINE

## 2022-08-16 SDOH — HEALTH STABILITY: PHYSICAL HEALTH: ON AVERAGE, HOW MANY DAYS PER WEEK DO YOU ENGAGE IN MODERATE TO STRENUOUS EXERCISE (LIKE A BRISK WALK)?: 2 DAYS

## 2022-08-16 SDOH — ECONOMIC STABILITY: INCOME INSECURITY: IN THE LAST 12 MONTHS, WAS THERE A TIME WHEN YOU WERE NOT ABLE TO PAY THE MORTGAGE OR RENT ON TIME?: NO

## 2022-08-16 SDOH — ECONOMIC STABILITY: FOOD INSECURITY: WITHIN THE PAST 12 MONTHS, THE FOOD YOU BOUGHT JUST DIDN'T LAST AND YOU DIDN'T HAVE MONEY TO GET MORE.: NEVER TRUE

## 2022-08-16 SDOH — ECONOMIC STABILITY: TRANSPORTATION INSECURITY
IN THE PAST 12 MONTHS, HAS LACK OF TRANSPORTATION KEPT YOU FROM MEETINGS, WORK, OR FROM GETTING THINGS NEEDED FOR DAILY LIVING?: NO

## 2022-08-16 SDOH — ECONOMIC STABILITY: FOOD INSECURITY: WITHIN THE PAST 12 MONTHS, YOU WORRIED THAT YOUR FOOD WOULD RUN OUT BEFORE YOU GOT MONEY TO BUY MORE.: NEVER TRUE

## 2022-08-16 SDOH — HEALTH STABILITY: PHYSICAL HEALTH: ON AVERAGE, HOW MANY MINUTES DO YOU ENGAGE IN EXERCISE AT THIS LEVEL?: 20 MIN

## 2022-08-16 SDOH — ECONOMIC STABILITY: INCOME INSECURITY: HOW HARD IS IT FOR YOU TO PAY FOR THE VERY BASICS LIKE FOOD, HOUSING, MEDICAL CARE, AND HEATING?: NOT VERY HARD

## 2022-08-16 SDOH — ECONOMIC STABILITY: TRANSPORTATION INSECURITY
IN THE PAST 12 MONTHS, HAS THE LACK OF TRANSPORTATION KEPT YOU FROM MEDICAL APPOINTMENTS OR FROM GETTING MEDICATIONS?: NO

## 2022-08-16 ASSESSMENT — SOCIAL DETERMINANTS OF HEALTH (SDOH)
DO YOU BELONG TO ANY CLUBS OR ORGANIZATIONS SUCH AS CHURCH GROUPS UNIONS, FRATERNAL OR ATHLETIC GROUPS, OR SCHOOL GROUPS?: NO
HOW OFTEN DO YOU GET TOGETHER WITH FRIENDS OR RELATIVES?: THREE TIMES A WEEK
HOW OFTEN DO YOU ATTEND CHURCH OR RELIGIOUS SERVICES?: MORE THAN 4 TIMES PER YEAR
IN A TYPICAL WEEK, HOW MANY TIMES DO YOU TALK ON THE PHONE WITH FAMILY, FRIENDS, OR NEIGHBORS?: MORE THAN THREE TIMES A WEEK

## 2022-08-16 ASSESSMENT — LIFESTYLE VARIABLES
HOW OFTEN DO YOU HAVE SIX OR MORE DRINKS ON ONE OCCASION: NEVER
AUDIT-C TOTAL SCORE: 1
HOW MANY STANDARD DRINKS CONTAINING ALCOHOL DO YOU HAVE ON A TYPICAL DAY: 1 OR 2
SKIP TO QUESTIONS 9-10: 1
HOW OFTEN DO YOU HAVE A DRINK CONTAINING ALCOHOL: MONTHLY OR LESS

## 2022-08-16 NOTE — PATIENT INSTRUCTIONS
Preparing for Your Surgery  Getting started  A nurse will call you to review your health history and instructions. They will give you an arrival time based on your scheduled surgery time. Please be ready to share:    Your doctor's clinic name and phone number    Your medical, surgical and anesthesia history    A list of allergies and sensitivities    A list of medicines, including herbal treatments and over-the-counter drugs    Whether the patient has a legal guardian (ask how to send us the papers in advance)  Please tell us if you're pregnant--or if there's any chance you might be pregnant. Some surgeries may injure a fetus (unborn baby), so they require a pregnancy test. Surgeries that are safe for a fetus don't always need a test, and you can choose whether to have one.   If you have a child who's having surgery, please ask for a copy of Preparing for Your Child's Surgery.    Preparing for surgery    Within 30 days of surgery: Have a pre-op exam (sometimes called an H&P, or History and Physical). This can be done at a clinic or pre-operative center.  ? If you're having a , you may not need this exam. Talk to your care team.    At your pre-op exam, talk to your care team about all medicines you take. If you need to stop any medicines before surgery, ask when to start taking them again.  ? We do this for your safety. Many medicines can make you bleed too much during surgery. Some change how well surgery (anesthesia) drugs work.    Call your insurance company to let them know you're having surgery. (If you don't have insurance, call [phone].)    Call your clinic if there's any change in your health. This includes signs of a cold or flu (sore throat, runny nose, cough, rash, fever). It also includes a scrape or scratch near the surgery site.    If you have questions on the day of surgery, call your hospital or surgery center.  COVID testing  You may need to be tested for COVID-19 before having  surgery. If so, we will give you instructions.  Eating and drinking guidelines  For your safety: Unless your surgeon tells you otherwise, follow the guidelines below.    Eat and drink as usual until 8 hours before surgery. After that, no food or milk.    Drink clear liquids until 2 hours before surgery. These are liquids you can see through, like water, Gatorade and Propel Water. You may also have black coffee and tea (no cream or milk).    Nothing by mouth within 2 hours of surgery. This includes gum, candy and breath mints.    If you drink alcohol: Stop drinking it the night before surgery.    If your care team tells you to take medicine on the morning of surgery, it's okay to take it with a sip of water.  Preventing infection    Shower or bathe the night before and morning of your surgery. Follow the instructions your clinic gave you. (If no instructions, use regular soap.)    Don't shave or clip hair near your surgery site. We'll remove the hair if needed.    Don't smoke or vape the morning of surgery. You may chew nicotine gum up to 2 hours before surgery. A nicotine patch is okay.  ? Note: Some surgeries require you to completely quit smoking and nicotine. Check with your surgeon.    Your care team will make every effort to keep you safe from infection. We will:  ? Clean our hands often with soap and water (or an alcohol-based hand rub).  ? Clean the skin at your surgery site with a special soap that kills germs.  ? Give you a special gown to keep you warm. (Cold raises the risk of infection.)  ? Wear special hair covers, masks, gowns and gloves during surgery.  ? Give antibiotic medicine, if prescribed. Not all surgeries need antibiotics.  What to bring on the day of surgery    Photo ID and insurance card    Copy of your health care directive, if you have one    Glasses and hearing aides (bring cases)  ? You can't wear contacts during surgery    Inhaler and eye drops, if you use them (tell us about these when  you arrive)    CPAP machine or breathing device, if you use them    A few personal items, if spending the night    If you have . . .  ? A pacemaker, ICD (cardiac defibrillator) or other implant: Bring the ID card.  ? An implanted stimulator: Bring the remote control.  ? A legal guardian: Bring a copy of the certified (court-stamped) guardianship papers.  Please remove any jewelry, including body piercings. Leave jewelry and other valuables at home.  If you're going home the day of surgery    You must have a responsible adult drive you home. They should stay with you overnight as well.    If you don't have someone to stay with you, and you aren't safe to go home alone, we may keep you overnight. Insurance often won't pay for this.  After surgery  If it's hard to control your pain or you need more pain medicine, please call your surgeon's office.  Questions?   If you have any questions for your care team, list them here: _________________________________________________________________________________________________________________________________________________________________________ ____________________________________ ____________________________________ ____________________________________  For informational purposes only. Not to replace the advice of your health care provider. Copyright   2003, 2019 [facility]. All rights reserved. Clinically reviewed by Maya Banda MD. Vimty [id number] - REV 07/21.

## 2022-08-16 NOTE — PROGRESS NOTES
[facility]  [address] [facility] [address]  Phone: [phone]  Primary Provider: Keyla Reed      PREOPERATIVE EVALUATION:  Today's date: 8/16/2022    Shaye Brian is a 61 year old female who presents for a preoperative evaluation.    Surgical Information:  Surgery/Procedure: total right hip replacement   Surgery Location: [facility]  Surgeon: Bob Jacobson   Surgery Date: 9-12-22  Time of Surgery: 9:15am  Where patient plans to recover: At home with family  Fax number for surgical facility: [phone]    Type of Anesthesia Anticipated: General    Assessment & Plan     The proposed surgical procedure is considered INTERMEDIATE risk.    Preop general physical exam  - Basic metabolic panel  (Ca, Cl, CO2, Creat, Gluc, K, Na, BUN); Future  - CBC with platelets; Future  - Basic metabolic panel  (Ca, Cl, CO2, Creat, Gluc, K, Na, BUN)  - CBC with platelets    Primary osteoarthritis of right hip    History of robot-assisted laparoscopic hysterectomy    Prediabetes  - Hemoglobin A1c; Future  - Hemoglobin A1c    Risks and Recommendations:  The patient has the following additional risks and recommendations for perioperative complications:   - No identified additional risk factors other than previously addressed    Medication Instructions:  Patient is on no chronic medications    RECOMMENDATION:  APPROVAL GIVEN to proceed with proposed procedure, without further diagnostic evaluation.      Subjective     HPI related to upcoming procedure: failed conservative measures for right hip OA    Preop Questions 8/16/2022   1. Have you ever had a heart attack or stroke? No   2. Have you ever had surgery on your heart or blood vessels, such as a stent placement, a coronary artery bypass, or surgery on an artery in your head, neck, heart, or legs? No   3. Do you have chest pain with activity? No   4. Do you have a history of  heart failure? No   5. Do you currently have a cold, bronchitis or  symptoms of other infection? UNKNOWN -    6. Do you have a cough, shortness of breath, or wheezing? No   7. Do you or anyone in your family have previous history of blood clots? YES - daughter, postpartum PE   8. Do you or does anyone in your family have a serious bleeding problem such as prolonged bleeding following surgeries or cuts? No   9. Have you ever had problems with anemia or been told to take iron pills? YES -    10. Have you had any abnormal blood loss such as black, tarry or bloody stools, or abnormal vaginal bleeding? No   11. Have you ever had a blood transfusion? No   12. Are you willing to have a blood transfusion if it is medically needed before, during, or after your surgery? Yes   13. Have you or any of your relatives ever had problems with anesthesia? YES - daughter - nausea, none for Mackenzie   14. Do you have sleep apnea, excessive snoring or daytime drowsiness? No   14a. Do you have a CPAP machine? No   15. Do you have any artifical heart valves or other implanted medical devices like a pacemaker, defibrillator, or continuous glucose monitor? No   16. Do you have artificial joints? No   17. Are you allergic to latex? No       Health Care Directive:  Patient has a Health Care Directive on file      Preoperative Review of :   reviewed - no record of controlled substances prescribed.      Status of Chronic Conditions:  See problem list for active medical problems.  Problems all longstanding and stable, except as noted/documented.  See ROS for pertinent symptoms related to these conditions.      Review of Systems  Constitutional, neuro, ENT, endocrine, pulmonary, cardiac, gastrointestinal, genitourinary, musculoskeletal, integument and psychiatric systems are negative, except as otherwise noted.    Patient Active Problem List    Diagnosis Date Noted     History of robot-assisted laparoscopic hysterectomy 12/07/2018     Priority: Medium     Prediabetes 12/07/2018     Priority: Medium     H/O  "bilateral salpingectomy 12/27/2013     Priority: Medium     Hyperlipidemia LDL goal <160 05/06/2013     Priority: Medium      Past Medical History:   Diagnosis Date     Mumps      Past Surgical History:   Procedure Laterality Date     D & C  1984    after miscarriage-first time     DAVINCI HYSTERECTOMY TOTAL, BILATERAL SALPINGO-OOPHORECTOMY, COMBINED  12/27/2013    Procedure: COMBINED DAVINCI HYSTERECTOMY TOTAL, SALPINGectomy;  Combined Davinci Total Laparoscopic HYSTERECTOMY, Bilateral Salpingectomy, Cystoscopy;  Surgeon: Karolina Eduardo DO;  Location: [facility] OR - Path Fibroids, Adenomyosis     Current Outpatient Medications   Medication Sig Dispense Refill     acetaminophen (TYLENOL) 325 MG tablet Take 325-650 mg by mouth every 6 hours as needed for mild pain       ibuprofen (ADVIL/MOTRIN) 200 MG capsule Take 200 mg by mouth every 4 hours as needed for fever         Allergies   Allergen Reactions     Iron      Iron infusion; throat sensitivity and redness on her chest        Social History     Tobacco Use     Smoking status: Never Smoker     Smokeless tobacco: Never Used   Substance Use Topics     Alcohol use: Yes     Alcohol/week: 0.0 standard drinks     Comment: socially -once every one month      Family History   Problem Relation Age of Onset     Alzheimer Disease Mother         macular degeneration     Cancer Mother         brain tumor     Hypertension Mother      Macular Degeneration Mother      Heart Disease Father      Cancer Father         ureter cancer     Respiratory Father         emphysema, smoker     Breast Cancer Other         maternal aunt-using hormones     Cancer Other         dad's sister with ovarian cancer, her daughter had ovarian cancer     Cancer - colorectal No family hx of      History   Drug Use No         Objective     /79 (BP Location: Right arm, Patient Position: Sitting, Cuff Size: Adult Regular)   Pulse 76   Temp 97  F (36.1  C) (Oral)   Resp 14   Ht 1.702 m (5' 7\")   Wt " 98 kg (216 lb)   LMP 12/05/2013   Breastfeeding No   BMI 33.83 kg/m      Physical Exam    GENERAL APPEARANCE: healthy, alert and no distress     EYES: EOMI, PERRL     HENT: ear canals and TM's normal and nose and mouth without ulcers or lesions     NECK: no adenopathy, no asymmetry, masses, or scars and thyroid normal to palpation     RESP: lungs clear to auscultation - no rales, rhonchi or wheezes     CV: regular rates and rhythm, normal S1 S2, no S3 or S4 and no murmur, click or rub     ABDOMEN:  soft, nontender, no HSM or masses and bowel sounds normal     MS: extremities normal- no gross deformities noted, no evidence of inflammation in joints, FROM in all extremities.     SKIN: no suspicious lesions or rashes     NEURO: Normal strength and tone, sensory exam grossly normal, mentation intact and speech normal     PSYCH: mentation appears normal. and affect normal/bright     LYMPHATICS: No cervical adenopathy    No results for input(s): HGB, PLT, INR, NA, POTASSIUM, CR, A1C in the last 05335 hours.     Diagnostics:  Labs pending at this time.  Results will be reviewed when available.   No EKG required, no history of coronary heart disease, significant arrhythmia, peripheral arterial disease or other structural heart disease.    Revised Cardiac Risk Index (RCRI):  The patient has the following serious cardiovascular risks for perioperative complications:   - No serious cardiac risks = 0 points     RCRI Interpretation: 0 points: Class I (very low risk - 0.4% complication rate)           Signed Electronically by: Keyla Reed MD  Copy of this evaluation report is provided to requesting physician.

## 2022-08-17 LAB
ANION GAP SERPL CALCULATED.3IONS-SCNC: 5 MMOL/L (ref 3–14)
BUN SERPL-MCNC: 20 MG/DL (ref 7–30)
CALCIUM SERPL-MCNC: 10.1 MG/DL (ref 8.5–10.1)
CHLORIDE BLD-SCNC: 107 MMOL/L (ref 94–109)
CO2 SERPL-SCNC: 26 MMOL/L (ref 20–32)
CREAT SERPL-MCNC: 0.64 MG/DL (ref 0.52–1.04)
GFR SERPL CREATININE-BSD FRML MDRD: >90 ML/MIN/1.73M2
GLUCOSE BLD-MCNC: 107 MG/DL (ref 70–99)
POTASSIUM BLD-SCNC: 4.6 MMOL/L (ref 3.4–5.3)
SODIUM SERPL-SCNC: 138 MMOL/L (ref 133–144)

## 2022-08-23 ENCOUNTER — MYC MEDICAL ADVICE (OUTPATIENT)
Dept: FAMILY MEDICINE | Facility: CLINIC | Age: 62
End: 2022-08-23

## 2022-08-24 ENCOUNTER — E-VISIT (OUTPATIENT)
Dept: FAMILY MEDICINE | Facility: CLINIC | Age: 62
End: 2022-08-24

## 2022-08-24 DIAGNOSIS — Z53.9 ERRONEOUS ENCOUNTER--DISREGARD: Primary | ICD-10-CM

## 2022-08-25 NOTE — TELEPHONE ENCOUNTER
Provider E-Visit time total (minutes): 0    CANNOT ANSWER THIS QUESTION THROUGH AN E-VISIT. SHE NEEDS TO BE SEEN IN PERSON.

## 2022-08-25 NOTE — TELEPHONE ENCOUNTER
Nothing with any provider till 08/22 okay to use approval with another provider?      Grace Haro/

## 2022-08-25 NOTE — PATIENT INSTRUCTIONS
Thank you for choosing us for your care. I think an in-clinic visit would be best next steps based on your symptoms. Please schedule a clinic appointment; you won t be charged for this eVisit.      You can schedule an appointment right here in [facility], or call [phone]

## 2022-08-25 NOTE — TELEPHONE ENCOUNTER
How urgent is this appt-   Could take approval with ANABELLE next Tuesday     Annemarie Jimenez RN

## 2022-08-29 ENCOUNTER — MYC MEDICAL ADVICE (OUTPATIENT)
Dept: FAMILY MEDICINE | Facility: CLINIC | Age: 62
End: 2022-08-29

## 2022-08-29 NOTE — TELEPHONE ENCOUNTER
1000 mg tylenol every 8 hours as needed    No other pain relievers suggested.     Can go 5 days prior with no NSAID if surgeon says okay.     Other medications weren't on her list.     Zyrtec D okay day before surgery  Benadryl okay night before surgery  Steroid cream okay day before.     HOLD all oral supplements 5 days prior.   Okay for rub day before surgery.     SHALONDA

## 2022-09-02 ENCOUNTER — OFFICE VISIT (OUTPATIENT)
Dept: FAMILY MEDICINE | Facility: CLINIC | Age: 62
End: 2022-09-02

## 2022-09-02 VITALS
DIASTOLIC BLOOD PRESSURE: 72 MMHG | TEMPERATURE: 97 F | RESPIRATION RATE: 14 BRPM | SYSTOLIC BLOOD PRESSURE: 116 MMHG | HEART RATE: 77 BPM | WEIGHT: 214 LBS | HEIGHT: 67 IN | BODY MASS INDEX: 33.59 KG/M2

## 2022-09-02 DIAGNOSIS — N39.0 URINARY TRACT INFECTION WITH HEMATURIA, SITE UNSPECIFIED: ICD-10-CM

## 2022-09-02 DIAGNOSIS — R35.0 URINARY FREQUENCY: Primary | ICD-10-CM

## 2022-09-02 DIAGNOSIS — I83.93 VARICOSE VEINS OF BOTH LOWER EXTREMITIES, UNSPECIFIED WHETHER COMPLICATED: ICD-10-CM

## 2022-09-02 DIAGNOSIS — R31.9 URINARY TRACT INFECTION WITH HEMATURIA, SITE UNSPECIFIED: ICD-10-CM

## 2022-09-02 LAB
ALBUMIN UR-MCNC: NEGATIVE MG/DL
APPEARANCE UR: CLEAR
BACTERIA #/AREA URNS HPF: ABNORMAL /HPF
BILIRUB UR QL STRIP: NEGATIVE
COLOR UR AUTO: YELLOW
GLUCOSE UR STRIP-MCNC: NEGATIVE MG/DL
HGB UR QL STRIP: ABNORMAL
KETONES UR STRIP-MCNC: NEGATIVE MG/DL
LEUKOCYTE ESTERASE UR QL STRIP: ABNORMAL
NITRATE UR QL: NEGATIVE
PH UR STRIP: 7 [PH] (ref 5–7)
RBC #/AREA URNS AUTO: ABNORMAL /HPF
SP GR UR STRIP: 1.01 (ref 1–1.03)
SQUAMOUS #/AREA URNS AUTO: ABNORMAL /LPF
UROBILINOGEN UR STRIP-ACNC: 0.2 E.U./DL
WBC #/AREA URNS AUTO: ABNORMAL /HPF

## 2022-09-02 PROCEDURE — 99213 OFFICE O/P EST LOW 20 MIN: CPT | Performed by: FAMILY MEDICINE

## 2022-09-02 PROCEDURE — 87086 URINE CULTURE/COLONY COUNT: CPT | Performed by: FAMILY MEDICINE

## 2022-09-02 PROCEDURE — 81001 URINALYSIS AUTO W/SCOPE: CPT | Performed by: FAMILY MEDICINE

## 2022-09-02 RX ORDER — CIPROFLOXACIN 500 MG/1
500 TABLET, FILM COATED ORAL 2 TIMES DAILY
Qty: 14 TABLET | Refills: 0 | Status: SHIPPED | OUTPATIENT
Start: 2022-09-02 | End: 2022-09-09

## 2022-09-02 ASSESSMENT — ENCOUNTER SYMPTOMS
AGITATION: 0
DYSURIA: 0
ACTIVITY CHANGE: 0

## 2022-09-02 NOTE — PROGRESS NOTES
"  Assessment & Plan     Urinary frequency  - UA Macro with Reflex to Micro and Culture - lab collect; Future  - UA Macro with Reflex to Micro and Culture - lab collect  - Urine Microscopic    - small amount of blood noticed in the urine .   - With urinary symptoms will treat with antibiotics .    - recommend pcp follow up for persistent hematuria   Patient added she did have some testing 6 years ago I was unable to locate those results .    Awaiting right hip surgery in 1 week,    Varicose veins of both lower extremities, unspecified whether complicated  - Compression Sleeve/Stocking Order for DME - ONLY FOR DME    Urinary tract infection with hematuria, site unspecified  - ciprofloxacin (CIPRO) 500 MG tablet; Take 1 tablet (500 mg) by mouth 2 times daily for 7 days  - Urine Culture Aerobic Bacterial - lab collect     BMI:   Estimated body mass index is 34.02 kg/m  as calculated from the following:    Height as of this encounter: 1.689 m (5' 6.5\").    Weight as of this encounter: 97.1 kg (214 lb).         Return in about 6 weeks (around 10/14/2022) for Follow up.    Tara Jones MD  [facility] is [facility] 61 [facility], presenting for the following health issues:  Musculoskeletal Problem and Urinary Problem      History of Present Illness       Reason for visit:  Follow up to pre-op appt    She eats 2-3 servings of fruits and vegetables daily.She consumes 0 sweetened beverage(s) daily.She exercises with enough effort to increase her heart rate 20 to 29 minutes per day.  She exercises with enough effort to increase her heart rate 3 or less days per week.   She is taking medications regularly.     Urinary frequency for couple days      Review of Systems   Constitutional: Negative for activity change.   Genitourinary: Negative for dysuria.   Psychiatric/Behavioral: Negative for agitation and behavioral problems.        Objective    /72 (BP Location: Right arm, Patient " "Position: Sitting, Cuff Size: Adult Large)   Pulse 77   Temp 97  F (36.1  C) (Oral)   Resp 14   Ht 1.689 m (5' 6.5\")   Wt 97.1 kg (214 lb)   LMP 12/05/2013   Breastfeeding No   BMI 34.02 kg/m    Body mass index is 34.02 kg/m .  Physical Exam  Pulmonary:      Effort: Pulmonary effort is normal.   Abdominal:      General: Abdomen is flat.   Musculoskeletal:      Comments: Varicose vein .  No tenderness on palpation of left leg .   Skin:     General: Skin is warm.   Neurological:      General: No focal deficit present.   Psychiatric:         Mood and Affect: Mood normal.              "

## 2022-09-06 ENCOUNTER — MYC MEDICAL ADVICE (OUTPATIENT)
Dept: FAMILY MEDICINE | Facility: CLINIC | Age: 62
End: 2022-09-06

## 2022-09-06 LAB — BACTERIA UR CULT: NORMAL

## 2022-09-12 ENCOUNTER — TRANSFERRED RECORDS (OUTPATIENT)
Dept: HEALTH INFORMATION MANAGEMENT | Facility: CLINIC | Age: 62
End: 2022-09-12

## 2022-09-23 ENCOUNTER — MYC MEDICAL ADVICE (OUTPATIENT)
Dept: FAMILY MEDICINE | Facility: CLINIC | Age: 62
End: 2022-09-23

## 2022-10-07 ENCOUNTER — TRANSFERRED RECORDS (OUTPATIENT)
Dept: HEALTH INFORMATION MANAGEMENT | Facility: CLINIC | Age: 62
End: 2022-10-07

## 2022-10-14 ENCOUNTER — TRANSFERRED RECORDS (OUTPATIENT)
Dept: HEALTH INFORMATION MANAGEMENT | Facility: CLINIC | Age: 62
End: 2022-10-14

## 2022-11-11 ENCOUNTER — TRANSFERRED RECORDS (OUTPATIENT)
Dept: HEALTH INFORMATION MANAGEMENT | Facility: CLINIC | Age: 62
End: 2022-11-11

## 2022-11-21 ENCOUNTER — HEALTH MAINTENANCE LETTER (OUTPATIENT)
Age: 62
End: 2022-11-21

## 2022-12-22 ENCOUNTER — APPOINTMENT (OUTPATIENT)
Dept: URBAN - METROPOLITAN AREA CLINIC 255 | Age: 62
Setting detail: DERMATOLOGY
End: 2022-12-25

## 2022-12-22 PROBLEM — D04.4 CARCINOMA IN SITU OF SKIN OF SCALP AND NECK: Status: ACTIVE | Noted: 2022-12-22

## 2022-12-22 PROCEDURE — 17311 MOHS 1 STAGE H/N/HF/G: CPT

## 2022-12-22 PROCEDURE — 13132 CMPLX RPR F/C/C/M/N/AX/G/H/F: CPT

## 2022-12-22 PROCEDURE — OTHER MOHS SURGERY: OTHER

## 2022-12-22 PROCEDURE — OTHER MIPS QUALITY: OTHER

## 2022-12-22 NOTE — PROCEDURE: MOHS SURGERY
Addended by: KOKO KLEIN on: 3/27/2022 09:59 AM     Modules accepted: Orders     Bcc Histology Text: There were numerous aggregates of basaloid cells.

## 2022-12-22 NOTE — PROCEDURE: MIPS QUALITY
Quality 431: Preventive Care And Screening: Unhealthy Alcohol Use - Screening: Patient not identified as an unhealthy alcohol user when screened for unhealthy alcohol use using a systematic screening method
Quality 110: Preventive Care And Screening: Influenza Immunization: Influenza Immunization not Administered because Patient Refused.
Detail Level: Detailed
Quality 130: Documentation Of Current Medications In The Medical Record: Current Medications Documented
Quality 143: Oncology: Medical And Radiation- Pain Intensity Quantified: Pain severity quantified, no pain present
Quality 226: Preventive Care And Screening: Tobacco Use: Screening And Cessation Intervention: Patient screened for tobacco use and is an ex/non-smoker

## 2023-01-03 ENCOUNTER — APPOINTMENT (OUTPATIENT)
Dept: URBAN - METROPOLITAN AREA CLINIC 253 | Age: 63
Setting detail: DERMATOLOGY
End: 2023-01-03

## 2023-01-03 DIAGNOSIS — Z48.02 ENCOUNTER FOR REMOVAL OF SUTURES: ICD-10-CM

## 2023-01-03 PROCEDURE — OTHER PHOTO-DOCUMENTATION: OTHER

## 2023-01-03 PROCEDURE — OTHER SUTURE REMOVAL (GLOBAL PERIOD): OTHER

## 2023-01-03 ASSESSMENT — LOCATION SIMPLE DESCRIPTION DERM: LOCATION SIMPLE: RIGHT ANTERIOR NECK

## 2023-01-03 ASSESSMENT — LOCATION ZONE DERM: LOCATION ZONE: NECK

## 2023-01-03 ASSESSMENT — LOCATION DETAILED DESCRIPTION DERM: LOCATION DETAILED: RIGHT INFERIOR ANTERIOR NECK

## 2023-01-03 NOTE — PROCEDURE: SUTURE REMOVAL (GLOBAL PERIOD)
Add 56066 Cpt? (Important Note: In 2017 The Use Of 56223 Is Being Tracked By Cms To Determine Future Global Period Reimbursement For Global Periods): no
Detail Level: Detailed

## 2023-01-12 NOTE — PROCEDURE: MOHS SURGERY
There are no Wet Read(s) to document. Bcc Infiltrative Histology Text: There were numerous aggregates of basaloid cells demonstrating an infiltrative pattern.

## 2023-01-30 ENCOUNTER — TRANSFERRED RECORDS (OUTPATIENT)
Dept: HEALTH INFORMATION MANAGEMENT | Facility: CLINIC | Age: 63
End: 2023-01-30

## 2023-03-20 ENCOUNTER — OFFICE VISIT (OUTPATIENT)
Dept: URGENT CARE | Facility: URGENT CARE | Age: 63
End: 2023-03-20

## 2023-03-20 VITALS
OXYGEN SATURATION: 98 % | TEMPERATURE: 98 F | HEART RATE: 78 BPM | SYSTOLIC BLOOD PRESSURE: 114 MMHG | DIASTOLIC BLOOD PRESSURE: 80 MMHG | RESPIRATION RATE: 20 BRPM

## 2023-03-20 DIAGNOSIS — R35.0 URINARY FREQUENCY: ICD-10-CM

## 2023-03-20 DIAGNOSIS — N39.0 URINARY TRACT INFECTION WITHOUT HEMATURIA, SITE UNSPECIFIED: Primary | ICD-10-CM

## 2023-03-20 LAB
BACTERIA #/AREA URNS HPF: ABNORMAL /HPF
MUCOUS THREADS #/AREA URNS LPF: PRESENT /LPF
RBC #/AREA URNS AUTO: ABNORMAL /HPF
SQUAMOUS #/AREA URNS AUTO: ABNORMAL /LPF
WBC #/AREA URNS AUTO: ABNORMAL /HPF

## 2023-03-20 PROCEDURE — 81015 MICROSCOPIC EXAM OF URINE: CPT

## 2023-03-20 PROCEDURE — 99213 OFFICE O/P EST LOW 20 MIN: CPT | Performed by: PHYSICIAN ASSISTANT

## 2023-03-20 RX ORDER — CEPHALEXIN 500 MG/1
500 CAPSULE ORAL 3 TIMES DAILY
Qty: 15 CAPSULE | Refills: 0 | Status: SHIPPED | OUTPATIENT
Start: 2023-03-20 | End: 2023-03-25

## 2023-03-20 NOTE — PROGRESS NOTES
SUBJECTIVE:  Shaye Brian is a 62 year old female who comes in with concerns for possible UTI.  Patient said mild UTI frequency and slight pressure for the past 4 days.  She has been taking over-the-counter Azo along with cranberry.  She states that she is just not completely getting better.  She denies any nausea or vomiting.  No severe flank pain.  No vaginal symptoms.  She has not noticed any blood in her urine.  She does have a history of UTIs.  She is otherwise in normal state of good health.    Past Medical History:   Diagnosis Date     Mumps      Current Outpatient Medications   Medication     acetaminophen (TYLENOL) 325 MG tablet     ibuprofen (ADVIL/MOTRIN) 200 MG capsule     No current facility-administered medications for this visit.     Social History     Socioeconomic History     Marital status:      Spouse name: Not on file     Number of children: Not on file     Years of education: Not on file     Highest education level: Not on file   Occupational History     Not on file   Tobacco Use     Smoking status: Never     Smokeless tobacco: Never   Vaping Use     Vaping Use: Never used   Substance and Sexual Activity     Alcohol use: Yes     Alcohol/week: 0.0 standard drinks     Comment: socially -once every one month      Drug use: No     Sexual activity: Yes     Partners: Male     Birth control/protection: Surgical     Comment: 12/2013 Mar [place] [facility] CRUZ Salpingectomy   Other Topics Concern     Parent/sibling w/ CABG, MI or angioplasty before 65F 55M? 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    One dog     Social Determinants of Health     Financial Resource Strain: Low Risk      Difficulty of Paying Living Expenses: Not very hard   Food Insecurity: No Food Insecurity     Worried About Running Out of Food in the Last Year: Never true     Ran Out of Food in the Last Year: Never true   Transportation Needs: No Transportation Needs     Lack  of Transportation (Medical): No     Lack of Transportation (Non-Medical): No   Physical Activity: Insufficiently Active     Days of Exercise per Week: 2 days     Minutes of Exercise per Session: 20 min   Stress: Stress Concern Present     Feeling of Stress : To some extent   Social Connections: Moderately Integrated     Frequency of Communication with Friends and Family: More than three times a week     Frequency of Social Gatherings with Friends and Family: Three times a week     Attends Church Services: More than 4 times per year     Active Member of Clubs or Organizations: No     Attends Club or Organization Meetings: Not on file     Marital Status:    Intimate Partner Violence: Not on file   Housing Stability: Low Risk      Unable to Pay for Housing in the Last Year: No     Number of Places Lived in the Last Year: 1     Unstable Housing in the Last Year: No     ROS negative other than stated above    Exam:  GENERAL APPEARANCE: healthy, alert and no distress  EYES: EOMI,  PERRL  RESP: lungs clear to auscultation - no rales, rhonchi or wheezes  CV: regular rates and rhythm, normal S1 S2, no S3 or S4 and no murmur, click or rub -  ABDOMEN:  soft, nontender, no HSM or masses and bowel sounds normal.  No CVA tenderness noted    Results for orders placed or performed in visit on 03/20/23   UA Microscopic with Reflex to Culture     Status: Abnormal   Result Value Ref Range    Bacteria Urine Few (A) None Seen /HPF    RBC Urine 5-10 (A) 0-2 /HPF /HPF    WBC Urine 5-10 (A) 0-5 /HPF /HPF    Squamous Epithelials Urine Few (A) None Seen /LPF    Mucus Urine Present (A) None Seen /LPF    Narrative    Urine Culture not indicated     assessment/plan:  (N39.0) Urinary tract infection without hematuria, site unspecified  (primary encounter diagnosis)  Comment:   Plan: cephALEXin (KEFLEX) 500 MG capsule          Mild UTI related symptoms for the past 4 days.  She has been using Azo along with cranberry trying to flush it  out.  She does have some bacteria noted.  Will place on Keflex for symptomatic relief of mild infection.  There is no fevers or signs of pyelonephritis.  Continue to push fluids.  Red flag signs were discussed we will follow-up with primary as needed    (R35.0) Urinary frequency  Comment:   Plan: UA Microscopic with Reflex to Culture,         CANCELED: UA Macroscopic with reflex to         Microscopic and Culture

## 2023-03-27 ENCOUNTER — E-VISIT (OUTPATIENT)
Dept: FAMILY MEDICINE | Facility: CLINIC | Age: 63
End: 2023-03-27

## 2023-03-27 DIAGNOSIS — R30.0 DYSURIA: Primary | ICD-10-CM

## 2023-03-27 PROCEDURE — 99421 OL DIG E/M SVC 5-10 MIN: CPT | Performed by: FAMILY MEDICINE

## 2023-03-27 NOTE — TELEPHONE ENCOUNTER
Provider E-Visit time total (minutes): 5 min     I have ordered lab , will reach out with results .    Tara Jones MD.

## 2023-03-27 NOTE — PATIENT INSTRUCTIONS
Dear Shaye Brian,     After reviewing your responses, I would like you to come in for a urine test to make sure we treat you correctly. This urine test is to evaluate you for a possible urinary tract infection, and should be scheduled for today or tomorrow. Schedule a Lab Only appointment here.     Lab appointments are not available at most locations on the weekends. If no Lab Only appointment is available, you should be seen in any of our convenient Walk-in or Urgent Care Centers, which can be found on our website here.     You will receive instructions with your results in [email or web address] once they are available.     If your symptoms worsen, you develop pain in your back or stomach, develop fevers, or are not improving in 5 days, please contact your primary care provider for an appointment or visit a Walk-in or Urgent Care Center to be seen.     Thanks again for choosing us as your health care partner,     Tara Jones MD

## 2023-03-28 ENCOUNTER — LAB (OUTPATIENT)
Dept: LAB | Facility: CLINIC | Age: 63
End: 2023-03-28

## 2023-03-28 DIAGNOSIS — R30.0 DYSURIA: Primary | ICD-10-CM

## 2023-03-28 DIAGNOSIS — R30.0 DYSURIA: ICD-10-CM

## 2023-03-28 LAB
ALBUMIN UR-MCNC: NEGATIVE MG/DL
APPEARANCE UR: CLEAR
BACTERIA #/AREA URNS HPF: ABNORMAL /HPF
BILIRUB UR QL STRIP: NEGATIVE
COLOR UR AUTO: YELLOW
GLUCOSE UR STRIP-MCNC: NEGATIVE MG/DL
HGB UR QL STRIP: ABNORMAL
KETONES UR STRIP-MCNC: NEGATIVE MG/DL
LEUKOCYTE ESTERASE UR QL STRIP: NEGATIVE
MUCOUS THREADS #/AREA URNS LPF: PRESENT /LPF
NITRATE UR QL: POSITIVE
PH UR STRIP: 5 [PH] (ref 5–7)
RBC #/AREA URNS AUTO: ABNORMAL /HPF
SP GR UR STRIP: 1.02 (ref 1–1.03)
SQUAMOUS #/AREA URNS AUTO: ABNORMAL /LPF
UROBILINOGEN UR STRIP-ACNC: 0.2 E.U./DL
WBC #/AREA URNS AUTO: ABNORMAL /HPF

## 2023-03-28 PROCEDURE — 87086 URINE CULTURE/COLONY COUNT: CPT

## 2023-03-28 PROCEDURE — 81001 URINALYSIS AUTO W/SCOPE: CPT

## 2023-03-28 RX ORDER — NITROFURANTOIN 25; 75 MG/1; MG/1
100 CAPSULE ORAL 2 TIMES DAILY
Qty: 14 CAPSULE | Refills: 0 | Status: SHIPPED | OUTPATIENT
Start: 2023-03-28 | End: 2023-05-14

## 2023-03-28 NOTE — PROGRESS NOTES
Patient urine suggestive of urinary tract infection with positive nitrite.  Cultures pending.  We will recommend 1 tablet nitrofurantoin twice daily for 7 days prescription sent.  Will update with urine culture results    Patient should follow-up with primary care provider for hematuria.  Testing included CT urogram and cystoscopy to rule out any other etiology for hematuria.    Tara ayala MD.

## 2023-03-28 NOTE — PROGRESS NOTES
Call to Shaye, below instructions and results. given. She is going out of town in a few days but is aware if hematuria to follow up. Mirian Johnson R.N.

## 2023-03-29 LAB — BACTERIA UR CULT: NO GROWTH

## 2023-04-16 ENCOUNTER — HEALTH MAINTENANCE LETTER (OUTPATIENT)
Age: 63
End: 2023-04-16

## 2023-05-14 ENCOUNTER — OFFICE VISIT (OUTPATIENT)
Dept: URGENT CARE | Facility: URGENT CARE | Age: 63
End: 2023-05-14

## 2023-05-14 VITALS
SYSTOLIC BLOOD PRESSURE: 112 MMHG | TEMPERATURE: 97.3 F | DIASTOLIC BLOOD PRESSURE: 68 MMHG | OXYGEN SATURATION: 96 % | HEART RATE: 72 BPM

## 2023-05-14 DIAGNOSIS — N39.0 URINARY TRACT INFECTION WITH HEMATURIA, SITE UNSPECIFIED: Primary | ICD-10-CM

## 2023-05-14 DIAGNOSIS — R31.9 URINARY TRACT INFECTION WITH HEMATURIA, SITE UNSPECIFIED: Primary | ICD-10-CM

## 2023-05-14 DIAGNOSIS — R30.0 DYSURIA: ICD-10-CM

## 2023-05-14 PROCEDURE — 99213 OFFICE O/P EST LOW 20 MIN: CPT | Performed by: PHYSICIAN ASSISTANT

## 2023-05-14 PROCEDURE — 81015 MICROSCOPIC EXAM OF URINE: CPT | Performed by: PHYSICIAN ASSISTANT

## 2023-05-14 PROCEDURE — 87086 URINE CULTURE/COLONY COUNT: CPT | Performed by: PHYSICIAN ASSISTANT

## 2023-05-14 RX ORDER — CEPHALEXIN 500 MG/1
500 CAPSULE ORAL 3 TIMES DAILY
Qty: 15 CAPSULE | Refills: 0 | Status: SHIPPED | OUTPATIENT
Start: 2023-05-14 | End: 2023-05-19

## 2023-05-14 ASSESSMENT — PAIN SCALES - GENERAL: PAINLEVEL: MODERATE PAIN (5)

## 2023-05-14 NOTE — PROGRESS NOTES
SUBJECTIVE:  Shaye Brian is a 62 year old female who comes in with a 2-day history of UTI related symptoms.  Patient's had urgency and lower pelvic pressure along with frequency and dysuria.  She not had any blood noted in her urine.  No high fevers nausea or vomiting.  Denies any vaginal symptoms.  She does have a history of UTIs.  She is otherwise at baseline health.  She has no STD concerns.    Past Medical History:   Diagnosis Date     Mumps      Patient Active Problem List   Diagnosis     Hyperlipidemia LDL goal <160     History of robot-assisted laparoscopic hysterectomy     Prediabetes     H/O bilateral salpingectomy     No current outpatient medications on file.     No current facility-administered medications for this visit.     Social History     Socioeconomic History     Marital status:      Spouse name: Not on file     Number of children: Not on file     Years of education: Not on file     Highest education level: Not on file   Occupational History     Not on file   Tobacco Use     Smoking status: Never     Smokeless tobacco: Never   Vaping Use     Vaping status: Never Used   Substance and Sexual Activity     Alcohol use: Yes     Alcohol/week: 0.0 standard drinks of alcohol     Comment: socially -once every one month      Drug use: No     Sexual activity: Yes     Partners: Male     Birth control/protection: Surgical     Comment: 12/2013 Mar [place] LA CRUZ Salpingectomy   Other Topics Concern     Parent/sibling w/ CABG, MI or angioplasty before 65F 55M? 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    One dog     Social Determinants of Health     Financial Resource Strain: Low Risk  (8/16/2022)    Overall Financial Resource Strain (CARDIA)      Difficulty of Paying Living Expenses: Not very hard   Food Insecurity: No Food Insecurity (8/16/2022)    Hunger Vital Sign      Worried About Running Out of Food in the Last Year: Never true      Ran  Out of Food in the Last Year: Never true   Transportation Needs: No Transportation Needs (8/16/2022)    PRAPARE - Transportation      Lack of Transportation (Medical): No      Lack of Transportation (Non-Medical): No   Physical Activity: Insufficiently Active (8/16/2022)    Exercise Vital Sign      Days of Exercise per Week: 2 days      Minutes of Exercise per Session: 20 min   Stress: Stress Concern Present (8/16/2022)    [employer] [place] [employer] - Occupational Stress Questionnaire      Feeling of Stress : To some extent   Social Connections: Moderately Integrated (8/16/2022)    Social Connection and Isolation Panel [NHANES]      Frequency of Communication with Friends and Family: More than three times a week      Frequency of Social Gatherings with Friends and Family: Three times a week      Attends Cheondoism Services: More than 4 times per year      Active Member of Clubs or Organizations: No      Attends Club or Organization Meetings: Not on file      Marital Status:    Intimate Partner Violence: Not on file   Housing Stability: Low Risk  (8/16/2022)    Housing Stability Vital Sign      Unable to Pay for Housing in the Last Year: No      Number of Places Lived in the Last Year: 1      Unstable Housing in the Last Year: No     ROS negative other than stated above    Exam:  GENERAL APPEARANCE: healthy, alert and no distress  EYES: EOMI,  PERRL  RESP: lungs clear to auscultation - no rales, rhonchi or wheezes  CV: regular rates and rhythm, normal S1 S2, no S3 or S4 and no murmur, click or rub -  ABDOMEN:  soft, nontender, no HSM or masses and bowel sounds normal.  No CVA tenderness  SKIN: no suspicious lesions or rashes    Results for orders placed or performed in visit on 05/14/23   UA Microscopic with Reflex to Culture     Status: Abnormal   Result Value Ref Range    Bacteria Urine Few (A) None Seen /HPF    RBC Urine 5-10 (A) 0-2 /HPF /HPF    WBC Urine 10-25 (A) 0-5 /HPF /HPF    Squamous  Epithelials Urine Few (A) None Seen /LPF    Mucus Urine Present (A) None Seen /LPF   Urine Culture     Status: None    Specimen: Urine, Midstream   Result Value Ref Range    Culture <10,000 CFU/mL Mixture of urogenital maryann      assessment/plan:  (N39.0,  R31.9) Urinary tract infection with hematuria, site unspecified  (primary encounter diagnosis)  Comment:   Plan: cephALEXin (KEFLEX) 500 MG capsule          Patient with a 2-day history of UTI related symptoms.  Keflex as directed.  No signs of pyelonephritis.  Prevention was reviewed.  Encouraged fluids Red flag signs were discussed we will follow-up with primary as needed    (R30.0) Dysuria  Comment:   Plan: UA Microscopic with Reflex to Culture, Urine         Culture, CANCELED: UA Macroscopic with reflex         to Microscopic and Culture

## 2023-05-16 LAB — BACTERIA UR CULT: NORMAL

## 2023-06-02 ENCOUNTER — HEALTH MAINTENANCE LETTER (OUTPATIENT)
Age: 63
End: 2023-06-02

## 2023-07-13 ENCOUNTER — TRANSFERRED RECORDS (OUTPATIENT)
Dept: HEALTH INFORMATION MANAGEMENT | Facility: CLINIC | Age: 63
End: 2023-07-13

## 2023-08-28 ENCOUNTER — OFFICE VISIT (OUTPATIENT)
Dept: FAMILY MEDICINE | Facility: CLINIC | Age: 63
End: 2023-08-28

## 2023-08-28 VITALS
RESPIRATION RATE: 18 BRPM | HEART RATE: 76 BPM | SYSTOLIC BLOOD PRESSURE: 118 MMHG | WEIGHT: 210.8 LBS | HEIGHT: 67 IN | OXYGEN SATURATION: 97 % | DIASTOLIC BLOOD PRESSURE: 82 MMHG | BODY MASS INDEX: 33.09 KG/M2 | TEMPERATURE: 97.8 F

## 2023-08-28 DIAGNOSIS — M16.12 PRIMARY OSTEOARTHRITIS OF LEFT HIP: ICD-10-CM

## 2023-08-28 DIAGNOSIS — R06.83 SNORING: ICD-10-CM

## 2023-08-28 DIAGNOSIS — Z01.818 PRE-OP EXAM: Primary | ICD-10-CM

## 2023-08-28 DIAGNOSIS — Z12.31 VISIT FOR SCREENING MAMMOGRAM: ICD-10-CM

## 2023-08-28 DIAGNOSIS — Z13.220 SCREENING CHOLESTEROL LEVEL: ICD-10-CM

## 2023-08-28 DIAGNOSIS — R73.03 PREDIABETES: ICD-10-CM

## 2023-08-28 LAB
ANION GAP SERPL CALCULATED.3IONS-SCNC: 9 MMOL/L (ref 7–15)
BUN SERPL-MCNC: 18 MG/DL (ref 8–23)
CALCIUM SERPL-MCNC: 9.9 MG/DL (ref 8.8–10.2)
CHLORIDE SERPL-SCNC: 105 MMOL/L (ref 98–107)
CHOLEST SERPL-MCNC: 199 MG/DL
CREAT SERPL-MCNC: 0.65 MG/DL (ref 0.51–0.95)
DEPRECATED HCO3 PLAS-SCNC: 25 MMOL/L (ref 22–29)
ERYTHROCYTE [DISTWIDTH] IN BLOOD BY AUTOMATED COUNT: 13.2 % (ref 10–15)
GFR SERPL CREATININE-BSD FRML MDRD: >90 ML/MIN/1.73M2
GLUCOSE SERPL-MCNC: 106 MG/DL (ref 70–99)
HCT VFR BLD AUTO: 38.1 % (ref 35–47)
HDLC SERPL-MCNC: 49 MG/DL
HGB BLD-MCNC: 12.5 G/DL (ref 11.7–15.7)
LDLC SERPL CALC-MCNC: 129 MG/DL
MCH RBC QN AUTO: 30.6 PG (ref 26.5–33)
MCHC RBC AUTO-ENTMCNC: 32.8 G/DL (ref 31.5–36.5)
MCV RBC AUTO: 93 FL (ref 78–100)
NONHDLC SERPL-MCNC: 150 MG/DL
PLATELET # BLD AUTO: 313 10E3/UL (ref 150–450)
POTASSIUM SERPL-SCNC: 4.3 MMOL/L (ref 3.4–5.3)
RBC # BLD AUTO: 4.08 10E6/UL (ref 3.8–5.2)
SODIUM SERPL-SCNC: 139 MMOL/L (ref 136–145)
TRIGL SERPL-MCNC: 105 MG/DL
WBC # BLD AUTO: 6.8 10E3/UL (ref 4–11)

## 2023-08-28 PROCEDURE — 80048 BASIC METABOLIC PNL TOTAL CA: CPT | Performed by: FAMILY MEDICINE

## 2023-08-28 PROCEDURE — 85027 COMPLETE CBC AUTOMATED: CPT | Performed by: FAMILY MEDICINE

## 2023-08-28 PROCEDURE — 80061 LIPID PANEL: CPT | Performed by: FAMILY MEDICINE

## 2023-08-28 PROCEDURE — 99214 OFFICE O/P EST MOD 30 MIN: CPT | Mod: 25 | Performed by: FAMILY MEDICINE

## 2023-08-28 PROCEDURE — 93000 ELECTROCARDIOGRAM COMPLETE: CPT | Performed by: FAMILY MEDICINE

## 2023-08-28 PROCEDURE — 83036 HEMOGLOBIN GLYCOSYLATED A1C: CPT | Performed by: FAMILY MEDICINE

## 2023-08-28 PROCEDURE — 36415 COLL VENOUS BLD VENIPUNCTURE: CPT | Performed by: FAMILY MEDICINE

## 2023-08-28 RX ORDER — AMOXICILLIN 500 MG/1
CAPSULE ORAL
COMMUNITY
Start: 2023-05-16 | End: 2023-11-24

## 2023-08-28 SDOH — HEALTH STABILITY: PHYSICAL HEALTH: ON AVERAGE, HOW MANY DAYS PER WEEK DO YOU ENGAGE IN MODERATE TO STRENUOUS EXERCISE (LIKE A BRISK WALK)?: 3 DAYS

## 2023-08-28 SDOH — ECONOMIC STABILITY: FOOD INSECURITY: WITHIN THE PAST 12 MONTHS, THE FOOD YOU BOUGHT JUST DIDN'T LAST AND YOU DIDN'T HAVE MONEY TO GET MORE.: NEVER TRUE

## 2023-08-28 SDOH — HEALTH STABILITY: PHYSICAL HEALTH: ON AVERAGE, HOW MANY MINUTES DO YOU ENGAGE IN EXERCISE AT THIS LEVEL?: 30 MIN

## 2023-08-28 SDOH — ECONOMIC STABILITY: INCOME INSECURITY: IN THE LAST 12 MONTHS, WAS THERE A TIME WHEN YOU WERE NOT ABLE TO PAY THE MORTGAGE OR RENT ON TIME?: NO

## 2023-08-28 SDOH — ECONOMIC STABILITY: FOOD INSECURITY: WITHIN THE PAST 12 MONTHS, YOU WORRIED THAT YOUR FOOD WOULD RUN OUT BEFORE YOU GOT MONEY TO BUY MORE.: NEVER TRUE

## 2023-08-28 SDOH — ECONOMIC STABILITY: INCOME INSECURITY: HOW HARD IS IT FOR YOU TO PAY FOR THE VERY BASICS LIKE FOOD, HOUSING, MEDICAL CARE, AND HEATING?: NOT HARD AT ALL

## 2023-08-28 ASSESSMENT — SOCIAL DETERMINANTS OF HEALTH (SDOH)
HOW OFTEN DO YOU ATTEND CHURCH OR RELIGIOUS SERVICES?: MORE THAN 4 TIMES PER YEAR
DO YOU BELONG TO ANY CLUBS OR ORGANIZATIONS SUCH AS CHURCH GROUPS UNIONS, FRATERNAL OR ATHLETIC GROUPS, OR SCHOOL GROUPS?: YES
IN A TYPICAL WEEK, HOW MANY TIMES DO YOU TALK ON THE PHONE WITH FAMILY, FRIENDS, OR NEIGHBORS?: MORE THAN THREE TIMES A WEEK
HOW OFTEN DO YOU GET TOGETHER WITH FRIENDS OR RELATIVES?: TWICE A WEEK

## 2023-08-28 ASSESSMENT — LIFESTYLE VARIABLES
HOW MANY STANDARD DRINKS CONTAINING ALCOHOL DO YOU HAVE ON A TYPICAL DAY: 1 OR 2
HOW OFTEN DO YOU HAVE SIX OR MORE DRINKS ON ONE OCCASION: NEVER
HOW OFTEN DO YOU HAVE A DRINK CONTAINING ALCOHOL: MONTHLY OR LESS
SKIP TO QUESTIONS 9-10: 1
AUDIT-C TOTAL SCORE: 1

## 2023-08-28 ASSESSMENT — PAIN SCALES - GENERAL: PAINLEVEL: NO PAIN (0)

## 2023-08-28 NOTE — PATIENT INSTRUCTIONS
Stop multivitamin 5 days prior to surgery    Stop ibuprofen, aspirin products 5 days prior to surgery    Can take all other supplements day before, not day of    Can take tylenol day before, not day of

## 2023-08-28 NOTE — PROGRESS NOTES
[facility]  [address] [facility] [address]  Phone: [phone]  Primary Provider: Goldie Reed  Pre-op Performing Provider: GOLDIE REED      PREOPERATIVE EVALUATION:  Today's date: 8/28/2023    Shaye Brian is a 62 year old female who presents for a preoperative evaluation.      8/28/2023    10:19 AM   Additional Questions   Roomed by Sarah CHRISTIAN       Surgical Information:  Surgery/Procedure: Left total hip arthroplasty  Surgery Location: [facility]   Surgeon: Dr. Posadas   Surgery Date: 09/12/2023  Time of Surgery: TBD  Where patient plans to recover: At home with family  Fax number for surgical facility: [phone]    Assessment & Plan     The proposed surgical procedure is considered INTERMEDIATE risk.    Pre-op exam  - EKG 12-lead complete w/read - Clinics  - CBC with platelets; Future  - Basic metabolic panel  (Ca, Cl, CO2, Creat, Gluc, K, Na, BUN); Future  - CBC with platelets  - Basic metabolic panel  (Ca, Cl, CO2, Creat, Gluc, K, Na, BUN)    Primary osteoarthritis of left hip    Visit for screening mammogram  - MA Screen Bilateral w/Nile; Future    Screening cholesterol level  - Lipid panel reflex to direct LDL Fasting; Future  - Lipid panel reflex to direct LDL Fasting    Snoring - believes she may have sleep apnea - ordered sleep study. Advised she share this info with surgery team, so they can ensure she is closely monitored during surgery.   - Adult Sleep Eval & Management  Referral; Future          - No identified additional risk factors other than previously addressed    Antiplatelet or Anticoagulation Medication Instructions:   - Patient is on no antiplatelet or anticoagulation medications.    Additional Medication Instructions:  Patient is on no additional chronic medications    RECOMMENDATION:  APPROVAL GIVEN to proceed with proposed procedure, without further diagnostic evaluation.      Subjective     HPI related to  upcoming procedure: left hip osteoarthritis, failed conservative measures        8/28/2023    10:11 AM   Preop Questions   1. Have you ever had a heart attack or stroke? No   2. Have you ever had surgery on your heart or blood vessels, such as a stent placement, a coronary artery bypass, or surgery on an artery in your head, neck, heart, or legs? No   3. Do you have chest pain with activity? No   4. Do you have a history of  heart failure? No   5. Do you currently have a cold, bronchitis or symptoms of other infection? No   6. Do you have a cough, shortness of breath, or wheezing? No   7. Do you or anyone in your family have previous history of blood clots? YES - family, provoked   8. Do you or does anyone in your family have a serious bleeding problem such as prolonged bleeding following surgeries or cuts? No   9. Have you ever had problems with anemia or been told to take iron pills? YES - remote past, normal labs since   10. Have you had any abnormal blood loss such as black, tarry or bloody stools, or abnormal vaginal bleeding? No   11. Have you ever had a blood transfusion? No   12. Are you willing to have a blood transfusion if it is medically needed before, during, or after your surgery? Yes   13. Have you or any of your relatives ever had problems with anesthesia? No   14. Do you have sleep apnea, excessive snoring or daytime drowsiness? UNKNOWN - see above   15. Do you have any artifical heart valves or other implanted medical devices like a pacemaker, defibrillator, or continuous glucose monitor? No   16. Do you have artificial joints? YES - right hip   17. Are you allergic to latex? No       Health Care Directive:  Patient has a Health Care Directive on file      Preoperative Review of :   reviewed - no record of controlled substances prescribed.      Status of Chronic Conditions:  See problem list for active medical problems.  Problems all longstanding and stable, except as noted/documented.  See  ROS for pertinent symptoms related to these conditions.    Review of Systems  Constitutional, neuro, ENT, endocrine, pulmonary, cardiac, gastrointestinal, genitourinary, musculoskeletal, integument and psychiatric systems are negative, except as otherwise noted.    Patient Active Problem List    Diagnosis Date Noted    History of robot-assisted laparoscopic hysterectomy 12/07/2018     Priority: Medium    Prediabetes 12/07/2018     Priority: Medium    H/O bilateral salpingectomy 12/27/2013     Priority: Medium    Hyperlipidemia LDL goal <160 05/06/2013     Priority: Medium      Past Medical History:   Diagnosis Date    Mumps      Past Surgical History:   Procedure Laterality Date    D & C  1984    after miscarriage-first time    DAVINCI HYSTERECTOMY TOTAL, BILATERAL SALPINGO-OOPHORECTOMY, COMBINED  12/27/2013    Procedure: COMBINED DAVINCI HYSTERECTOMY TOTAL, SALPINGectomy;  Combined Davinci Total Laparoscopic HYSTERECTOMY, Bilateral Salpingectomy, Cystoscopy;  Surgeon: Karolina Eduardo DO;  Location: [facility] OR - Path Fibroids, Adenomyosis     Current Outpatient Medications   Medication Sig Dispense Refill    amoxicillin (AMOXIL) 500 MG capsule TAKE FOUR CAPSULES BY MOUTH BEFORE DENTAL APPOINTMENT         Allergies   Allergen Reactions    Iron      Iron infusion; throat sensitivity and redness on her chest        Social History     Tobacco Use    Smoking status: Never    Smokeless tobacco: Never   Substance Use Topics    Alcohol use: Yes     Alcohol/week: 0.0 standard drinks of alcohol     Comment: socially -once every one month      Family History   Problem Relation Age of Onset    Alzheimer Disease Mother         macular degeneration    Cancer Mother         brain tumor    Hypertension Mother     Macular Degeneration Mother     Heart Disease Father     Cancer Father         ureter cancer    Respiratory Father         emphysema, smoker    Breast Cancer Other         maternal aunt-using hormones    Cancer Other       "   dad's sister with ovarian cancer, her daughter had ovarian cancer    Cancer - colorectal No family hx of      History   Drug Use No         Objective     /82 (BP Location: Right arm, Patient Position: Sitting, Cuff Size: Adult Regular)   Pulse 76   Temp 97.8  F (36.6  C) (Oral)   Resp 18   Ht 1.689 m (5' 6.5\")   Wt 95.6 kg (210 lb 12.8 oz)   LMP 12/05/2013   SpO2 97%   BMI 33.51 kg/m      Physical Exam    GENERAL APPEARANCE: healthy, alert and no distress     EYES: EOMI, PERRL     HENT: ear canals and TM's normal and nose and mouth without ulcers or lesions     NECK: no adenopathy, no asymmetry, masses, or scars and thyroid normal to palpation     RESP: lungs clear to auscultation - no rales, rhonchi or wheezes     CV: regular rates and rhythm, normal S1 S2, no S3 or S4 and no murmur, click or rub     ABDOMEN:  soft, nontender, no HSM or masses and bowel sounds normal     MS: extremities normal- no gross deformities noted, no evidence of inflammation in joints, FROM in all extremities.     SKIN: no suspicious lesions or rashes     NEURO: Normal strength and tone, sensory exam grossly normal, mentation intact and speech normal     PSYCH: mentation appears normal. and affect normal/bright     LYMPHATICS: No cervical adenopathy      Diagnostics:  Labs pending at this time.  Results will be reviewed when available.   EKG: appears normal, NSR, normal axis, normal intervals, no acute ST/T changes c/w ischemia, no LVH by voltage criteria, unchanged from previous tracings    Revised Cardiac Risk Index (RCRI):  The patient has the following serious cardiovascular risks for perioperative complications:   - No serious cardiac risks = 0 points     RCRI Interpretation: 0 points: Class I (very low risk - 0.4% complication rate)         Signed Electronically by: Keyla Reed MD  Copy of this evaluation report is provided to requesting physician.    "

## 2023-08-29 LAB — HBA1C MFR BLD: 6.5 % (ref 0–5.6)

## 2023-09-12 ENCOUNTER — TRANSFERRED RECORDS (OUTPATIENT)
Dept: HEALTH INFORMATION MANAGEMENT | Facility: CLINIC | Age: 63
End: 2023-09-12

## 2023-09-21 ENCOUNTER — TRANSFERRED RECORDS (OUTPATIENT)
Dept: HEALTH INFORMATION MANAGEMENT | Facility: CLINIC | Age: 63
End: 2023-09-21

## 2023-10-02 ENCOUNTER — TRANSFERRED RECORDS (OUTPATIENT)
Dept: HEALTH INFORMATION MANAGEMENT | Facility: CLINIC | Age: 63
End: 2023-10-02

## 2023-11-24 ENCOUNTER — ANCILLARY PROCEDURE (OUTPATIENT)
Dept: GENERAL RADIOLOGY | Facility: CLINIC | Age: 63
End: 2023-11-24
Attending: FAMILY MEDICINE

## 2023-11-24 ENCOUNTER — OFFICE VISIT (OUTPATIENT)
Dept: FAMILY MEDICINE | Facility: CLINIC | Age: 63
End: 2023-11-24

## 2023-11-24 VITALS
OXYGEN SATURATION: 97 % | SYSTOLIC BLOOD PRESSURE: 124 MMHG | RESPIRATION RATE: 18 BRPM | DIASTOLIC BLOOD PRESSURE: 68 MMHG | TEMPERATURE: 97.4 F | BODY MASS INDEX: 30.83 KG/M2 | WEIGHT: 196.4 LBS | HEIGHT: 67 IN | HEART RATE: 77 BPM

## 2023-11-24 DIAGNOSIS — M25.561 ACUTE PAIN OF BOTH KNEES: ICD-10-CM

## 2023-11-24 DIAGNOSIS — Z00.00 ROUTINE GENERAL MEDICAL EXAMINATION AT A HEALTH CARE FACILITY: Primary | ICD-10-CM

## 2023-11-24 DIAGNOSIS — M25.562 ACUTE PAIN OF BOTH KNEES: ICD-10-CM

## 2023-11-24 DIAGNOSIS — Z12.83 SKIN CANCER SCREENING: ICD-10-CM

## 2023-11-24 DIAGNOSIS — R73.03 PREDIABETES: ICD-10-CM

## 2023-11-24 DIAGNOSIS — Z12.31 VISIT FOR SCREENING MAMMOGRAM: ICD-10-CM

## 2023-11-24 LAB
ALBUMIN SERPL BCG-MCNC: 4.2 G/DL (ref 3.5–5.2)
ALP SERPL-CCNC: 85 U/L (ref 40–150)
ALT SERPL W P-5'-P-CCNC: 25 U/L (ref 0–50)
ANION GAP SERPL CALCULATED.3IONS-SCNC: 11 MMOL/L (ref 7–15)
AST SERPL W P-5'-P-CCNC: 23 U/L (ref 0–45)
BILIRUB SERPL-MCNC: 0.3 MG/DL
BUN SERPL-MCNC: 19.2 MG/DL (ref 8–23)
CALCIUM SERPL-MCNC: 9.8 MG/DL (ref 8.8–10.2)
CHLORIDE SERPL-SCNC: 107 MMOL/L (ref 98–107)
CHOLEST SERPL-MCNC: 191 MG/DL
CREAT SERPL-MCNC: 0.62 MG/DL (ref 0.51–0.95)
DEPRECATED HCO3 PLAS-SCNC: 24 MMOL/L (ref 22–29)
EGFRCR SERPLBLD CKD-EPI 2021: >90 ML/MIN/1.73M2
GLUCOSE SERPL-MCNC: 107 MG/DL (ref 70–99)
HBA1C MFR BLD: 5.8 % (ref 0–5.6)
HDLC SERPL-MCNC: 52 MG/DL
LDLC SERPL CALC-MCNC: 128 MG/DL
NONHDLC SERPL-MCNC: 139 MG/DL
POTASSIUM SERPL-SCNC: 4.4 MMOL/L (ref 3.4–5.3)
PROT SERPL-MCNC: 6.8 G/DL (ref 6.4–8.3)
SODIUM SERPL-SCNC: 142 MMOL/L (ref 135–145)
TRIGL SERPL-MCNC: 54 MG/DL

## 2023-11-24 PROCEDURE — 99213 OFFICE O/P EST LOW 20 MIN: CPT | Mod: 25 | Performed by: FAMILY MEDICINE

## 2023-11-24 PROCEDURE — 36415 COLL VENOUS BLD VENIPUNCTURE: CPT | Performed by: FAMILY MEDICINE

## 2023-11-24 PROCEDURE — 73562 X-RAY EXAM OF KNEE 3: CPT | Mod: TC | Performed by: RADIOLOGY

## 2023-11-24 PROCEDURE — 80061 LIPID PANEL: CPT | Performed by: FAMILY MEDICINE

## 2023-11-24 PROCEDURE — 99396 PREV VISIT EST AGE 40-64: CPT | Performed by: FAMILY MEDICINE

## 2023-11-24 PROCEDURE — 83036 HEMOGLOBIN GLYCOSYLATED A1C: CPT | Performed by: FAMILY MEDICINE

## 2023-11-24 PROCEDURE — 80053 COMPREHEN METABOLIC PANEL: CPT | Performed by: FAMILY MEDICINE

## 2023-11-24 SDOH — HEALTH STABILITY: PHYSICAL HEALTH: ON AVERAGE, HOW MANY DAYS PER WEEK DO YOU ENGAGE IN MODERATE TO STRENUOUS EXERCISE (LIKE A BRISK WALK)?: 6 DAYS

## 2023-11-24 SDOH — HEALTH STABILITY: PHYSICAL HEALTH: ON AVERAGE, HOW MANY MINUTES DO YOU ENGAGE IN EXERCISE AT THIS LEVEL?: 30 MIN

## 2023-11-24 ASSESSMENT — ENCOUNTER SYMPTOMS
PALPITATIONS: 0
FREQUENCY: 0
ARTHRALGIAS: 1
DYSURIA: 0
JOINT SWELLING: 0
HEMATOCHEZIA: 0
FEVER: 0
CONSTIPATION: 0
BREAST MASS: 0
HEARTBURN: 0
HEADACHES: 0
PARESTHESIAS: 0
HEMATURIA: 0
DIARRHEA: 0
SORE THROAT: 0
MYALGIAS: 0
ABDOMINAL PAIN: 0
EYE PAIN: 0
DIZZINESS: 0
COUGH: 0
NAUSEA: 0
SHORTNESS OF BREATH: 0
CHILLS: 0
NERVOUS/ANXIOUS: 0
WEAKNESS: 0

## 2023-11-24 ASSESSMENT — SOCIAL DETERMINANTS OF HEALTH (SDOH)
HOW OFTEN DO YOU ATTENT MEETINGS OF THE CLUB OR ORGANIZATION YOU BELONG TO?: MORE THAN 4 TIMES PER YEAR
IN A TYPICAL WEEK, HOW MANY TIMES DO YOU TALK ON THE PHONE WITH FAMILY, FRIENDS, OR NEIGHBORS?: MORE THAN THREE TIMES A WEEK
HOW OFTEN DO YOU GET TOGETHER WITH FRIENDS OR RELATIVES?: TWICE A WEEK
HOW OFTEN DO YOU ATTEND CHURCH OR RELIGIOUS SERVICES?: MORE THAN 4 TIMES PER YEAR
DO YOU BELONG TO ANY CLUBS OR ORGANIZATIONS SUCH AS CHURCH GROUPS UNIONS, FRATERNAL OR ATHLETIC GROUPS, OR SCHOOL GROUPS?: YES

## 2023-11-24 ASSESSMENT — LIFESTYLE VARIABLES
HOW MANY STANDARD DRINKS CONTAINING ALCOHOL DO YOU HAVE ON A TYPICAL DAY: 1 OR 2
SKIP TO QUESTIONS 9-10: 1
AUDIT-C TOTAL SCORE: 1
HOW OFTEN DO YOU HAVE SIX OR MORE DRINKS ON ONE OCCASION: NEVER
HOW OFTEN DO YOU HAVE A DRINK CONTAINING ALCOHOL: MONTHLY OR LESS

## 2023-11-24 NOTE — PROGRESS NOTES
SUBJECTIVE:   Shaye is a 63 year old, presenting for the following:  Physical        11/24/2023     7:14 AM   Additional Questions   Roomed by Shivani       Healthy Habits:     Getting at least 3 servings of Calcium per day:  Yes    Bi-annual eye exam:  NO    Dental care twice a year:  Yes    Sleep apnea or symptoms of sleep apnea:  Excessive snoring    Diet:  Diabetic, Carbohydrate counting and Other    Frequency of exercise:  6-7 days/week    Duration of exercise:  15-30 minutes    Taking medications regularly:  Yes    Medication side effects:  None    Additional concerns today:  Yes      Bilateral knee pain, right > left. Reports burning sensation in the right knee. Feels like they are swollen. Able to walk for exercise daily. History of bilateral hip OA, s/p JULIA.         Social History     Tobacco Use    Smoking status: Never    Smokeless tobacco: Never   Substance Use Topics    Alcohol use: Yes     Alcohol/week: 0.0 standard drinks of alcohol     Comment: socially -once every one month              11/24/2023     7:04 AM   Alcohol Use   Prescreen: >3 drinks/day or >7 drinks/week? No     Reviewed orders with patient.  Reviewed health maintenance and updated orders accordingly - Yes  Patient Active Problem List   Diagnosis    Hyperlipidemia LDL goal <160    History of robot-assisted laparoscopic hysterectomy    Prediabetes    H/O bilateral salpingectomy     Past Surgical History:   Procedure Laterality Date    D & C  1984    after miscarriage-first time    DAVINCI HYSTERECTOMY TOTAL, BILATERAL SALPINGO-OOPHORECTOMY, COMBINED  12/27/2013    Procedure: COMBINED DAVINCI HYSTERECTOMY TOTAL, SALPINGectomy;  Combined Davinci Total Laparoscopic HYSTERECTOMY, Bilateral Salpingectomy, Cystoscopy;  Surgeon: Karolina Eduardo DO;  Location: [facility] OR - Path Fibroids, Adenomyosis       Social History     Tobacco Use    Smoking status: Never    Smokeless tobacco: Never   Substance Use Topics    Alcohol use: Yes      Alcohol/week: 0.0 standard drinks of alcohol     Comment: socially -once every one month      Family History   Problem Relation Age of Onset    Alzheimer Disease Mother         macular degeneration    Cancer Mother         brain tumor    Hypertension Mother     Macular Degeneration Mother     Heart Disease Father     Cancer Father         ureter cancer    Respiratory Father         emphysema, smoker    Breast Cancer Other         maternal aunt-using hormones    Cancer Other         dad's sister with ovarian cancer, her daughter had ovarian cancer    Cancer - colorectal No family hx of            Breast Cancer Screenin/16/2022     9:41 AM   Breast CA Risk Assessment (FHS-7)   Do you have a family history of breast, colon, or ovarian cancer? No / Unknown       click delete button to remove this line now  Mammogram Screening: Recommended mammography every 1-2 years with patient discussion and risk factor consideration  Pertinent mammograms are reviewed under the imaging tab.    History of abnormal Pap smear: NO - age 30-65 PAP every 5 years with negative HPV co-testing recommended      2013    12:00 AM   PAP / HPV   PAP (Historical) NIL      Reviewed and updated as needed this visit by clinical staff   Tobacco  Allergies               Reviewed and updated as needed this visit by Provider                     Review of Systems   Constitutional:  Negative for chills and fever.   HENT:  Negative for congestion, ear pain, hearing loss and sore throat.    Eyes:  Negative for pain and visual disturbance.   Respiratory:  Negative for cough and shortness of breath.    Cardiovascular:  Negative for chest pain, palpitations and peripheral edema.   Gastrointestinal:  Negative for abdominal pain, constipation, diarrhea, heartburn, hematochezia and nausea.   Breasts:  Negative for tenderness, breast mass and discharge.   Genitourinary:  Negative for dysuria, frequency, genital sores, hematuria, pelvic pain, urgency,  "vaginal bleeding and vaginal discharge.   Musculoskeletal:  Positive for arthralgias. Negative for joint swelling and myalgias.   Skin:  Negative for rash.   Neurological:  Negative for dizziness, weakness, headaches and paresthesias.   Psychiatric/Behavioral:  Negative for mood changes. The patient is not nervous/anxious.         OBJECTIVE:   /68   Pulse 77   Temp 97.4  F (36.3  C) (Tympanic)   Resp 18   Ht 1.702 m (5' 7\")   Wt 89.1 kg (196 lb 6.4 oz)   LMP 12/05/2013   SpO2 97%   BMI 30.76 kg/m    Physical Exam  GENERAL: healthy, alert and no distress  EYES: Eyes grossly normal to inspection, PERRL and conjunctivae and sclerae normal  HENT: ear canals and TM's normal, nose and mouth without ulcers or lesions  NECK: no adenopathy, no asymmetry, masses, or scars and thyroid normal to palpation  RESP: lungs clear to auscultation - no rales, rhonchi or wheezes  BREAST: normal without masses, tenderness or nipple discharge and no palpable axillary masses or adenopathy  CV: regular rate and rhythm, normal S1 S2, no S3 or S4, no murmur, click or rub, no peripheral edema and peripheral pulses strong  ABDOMEN: soft, nontender, no hepatosplenomegaly, no masses and bowel sounds normal  MS: no gross musculoskeletal defects noted, no edema  SKIN: no suspicious lesions or rashes  NEURO: Normal strength and tone, mentation intact and speech normal  PSYCH: mentation appears normal, affect normal/bright    Diagnostic Test Results:  Labs reviewed in Epic    ASSESSMENT/PLAN:     1. Routine general medical examination at a health care facility  - Lipid panel reflex to direct LDL Fasting; Future  - Comprehensive metabolic panel (BMP + Alb, Alk Phos, ALT, AST, Total. Bili, TP); Future  - Lipid panel reflex to direct LDL Fasting  - Comprehensive metabolic panel (BMP + Alb, Alk Phos, ALT, AST, Total. Bili, TP)    2. Acute pain of both knees - discussed XR with mild OA. She is not limited by her pain. Will consult with her " "orthopedist at her upcoming appointment.   - XR Knee Bilateral 3 Views; Future    3. Prediabetes - last lost 17 lb since her last visit in August. Feeling confident that she can maintain.   - Hemoglobin A1c; Future  - Hemoglobin A1c    4. Skin cancer screening - history of skin cancer, advised annual skin checks.   - Adult Dermatology  Referral; Future    5. Visit for screening mammogram - annual mammogram recommended  - MA Screen Bilateral w/Nile; Future    COUNSELING:  Reviewed preventive health counseling, as reflected in patient instructions      BMI:   Estimated body mass index is 30.76 kg/m  as calculated from the following:    Height as of this encounter: 1.702 m (5' 7\").    Weight as of this encounter: 89.1 kg (196 lb 6.4 oz).       She reports that she has never smoked. She has never used smokeless tobacco.        Keyla Reed MD  [facility]  "

## 2023-11-27 ENCOUNTER — TRANSFERRED RECORDS (OUTPATIENT)
Dept: HEALTH INFORMATION MANAGEMENT | Facility: CLINIC | Age: 63
End: 2023-11-27

## 2023-11-30 ENCOUNTER — HOSPITAL ENCOUNTER (OUTPATIENT)
Dept: MAMMOGRAPHY | Facility: CLINIC | Age: 63
Discharge: HOME | End: 2023-11-30
Attending: FAMILY MEDICINE | Admitting: FAMILY MEDICINE

## 2023-11-30 DIAGNOSIS — Z12.31 VISIT FOR SCREENING MAMMOGRAM: ICD-10-CM

## 2023-11-30 PROCEDURE — 77067 SCR MAMMO BI INCL CAD: CPT

## 2024-02-02 ENCOUNTER — OFFICE VISIT (OUTPATIENT)
Dept: DERMATOLOGY | Facility: CLINIC | Age: 64
End: 2024-02-02

## 2024-02-02 DIAGNOSIS — Z12.83 SKIN CANCER SCREENING: ICD-10-CM

## 2024-02-02 PROCEDURE — 99203 OFFICE O/P NEW LOW 30 MIN: CPT | Mod: 25 | Performed by: PHYSICIAN ASSISTANT

## 2024-02-02 PROCEDURE — 17003 DESTRUCT PREMALG LES 2-14: CPT | Performed by: PHYSICIAN ASSISTANT

## 2024-02-02 PROCEDURE — 17000 DESTRUCT PREMALG LESION: CPT | Performed by: PHYSICIAN ASSISTANT

## 2024-02-02 ASSESSMENT — PAIN SCALES - GENERAL: PAINLEVEL: NO PAIN (0)

## 2024-02-02 NOTE — NURSING NOTE
Chief Complaint   Patient presents with    Skin Check     Patient reports no new lesions of concern. The patient would like a full body skin check.      Marycarmen Tobias LPN

## 2024-02-02 NOTE — LETTER
2/2/2024       RE: Shaye Brian  [address] [place] [address] [place] [address]     Dear Colleague,    Thank you for referring your patient, Shaye Brian, to the [facility] [place] at [facility]. Please see a copy of my visit note below.    [facility] Dermatology Note  Encounter Date: Feb 2, 2024  Office Visit     Reviewed patients past medical history and pertinent chart review prior to patients visit today.     Dermatology Problem List:  # Squamous cell carcinoma x2, right neck and right cheek, [place]  # Actinic keratosis  - cryotherapy 2/2/2024  # Digital mucus cyst  ___________________________________________    Assessment & Plan:     #Actinic keratosis x2  - We discussed the precancerous nature of the skin lesions.  I recommended liquid nitrogen cryotherapy and the patient was agreeable.      Cryotherapy procedure note, location(s): left helix x1, right temple x1 After verbal consent and discussion of risks and benefits including, but not limited to, dyspigmentation/scar, blister, and pain, the lesion(s) was(were) treated with 1-2 mm freeze border for 1-2 cycles with liquid nitrogen. Post cryotherapy instructions were provided.    # Digital mucus cyst  - We discussed this diagnosis. Should the lesion drain or become symptomatic we will refer to ortho surgery for evaluation.     # Multiple nevi, trunk and extremities  # Solar lentigines  - No concerning features on dermoscopy. We discussed the importance of self exams at home.   - ABCDEs: Counseled ABCDEs of melanoma: Asymmetry, Border (irregularity), Color (not uniform, changes in color), Diameter (greater than 6 mm which is about the size of a pencil eraser), and Evolving (any changes in preexisting moles).  - Sun protection: Counseled SPF 30+ sunscreen, UPF clothing, sun avoidance, tanning bed avoidance.    # Cherry angiomas  # Seborrheic  keratoses  - We discussed the benign nature of the skin lesions. No treatment required. Continued observation recommended. Follow up with any concerns.      Follow-up:  Annual for follow up full body skin exam, as needed for new or changing lesions or new concerns    All risks, benefits and alternatives were discussed with patient.  Patient is in agreement and understands the assessment and plan.  All questions were answered.  Mireya Hinton PA-C  [facility]    ____________________________________________    CC: Skin Check (Patient reports no new lesions of concern. The patient would like a full body skin check. )    HPI:  Ms. Shaye Brian is a(n) 63 year old female who presents today with her  as a new patient for a full body skin cancer screening. The patient reports a history of squamous cell carcinoma of the right lateral neck and right cheek, treated at an outside facility. No specific cutaneous concerns today. The patient reports trying to be diligent with photoprotection.      Physical Exam:  Vitals: LMP 12/05/2013   SKIN: Total skin excluding the genitalia areas was performed. The exam included the scalp, face, neck, bilateral arms, chest, back, abdomen, bilateral legs, digits, mons pubis, buttocks, and nails.   - More II.  - Pink macule(s) with gritty scale involving the left temple x1 and left helix x1, consistent with actinic keratosis.   - The right 3rd toe demonstrates a translucent papule, consistent with a digital mucus cyst.   - Multiple tan/brown macules and papules scattered throughout exam, consistent with benign nevi. No concerning features on dermoscopy.   - Scattered tan, homogenous macules scattered on sun exposed skin, consistent with solar lentigines.   - Scattered waxy, stuck on appearing papules and patches, consistent with seborrheic keratoses.    - Several 1-2 mm red dome shaped symmetric papules, consistent with cherry angiomas.     Medications:  No  current outpatient medications on file.     No current facility-administered medications for this visit.      Past Medical History:   Patient Active Problem List   Diagnosis    Hyperlipidemia LDL goal <160    History of robot-assisted laparoscopic hysterectomy    Prediabetes    H/O bilateral salpingectomy     Past Medical History:   Diagnosis Date    Mumps        CC Keyla Reed MD  [id number] [place] [address] on close of this encounter.

## 2024-02-02 NOTE — PROGRESS NOTES
[facility] Dermatology Note  Encounter Date: Feb 2, 2024  Office Visit     Reviewed patients past medical history and pertinent chart review prior to patients visit today.     Dermatology Problem List:  # Squamous cell carcinoma x2, right neck and right cheek, [place]  # Actinic keratosis  - cryotherapy 2/2/2024  # Digital mucus cyst  ___________________________________________    Assessment & Plan:     #Actinic keratosis x2  - We discussed the precancerous nature of the skin lesions.  I recommended liquid nitrogen cryotherapy and the patient was agreeable.      Cryotherapy procedure note, location(s): left helix x1, right temple x1 After verbal consent and discussion of risks and benefits including, but not limited to, dyspigmentation/scar, blister, and pain, the lesion(s) was(were) treated with 1-2 mm freeze border for 1-2 cycles with liquid nitrogen. Post cryotherapy instructions were provided.    # Digital mucus cyst  - We discussed this diagnosis. Should the lesion drain or become symptomatic we will refer to ortho surgery for evaluation.     # Multiple nevi, trunk and extremities  # Solar lentigines  - No concerning features on dermoscopy. We discussed the importance of self exams at home.   - ABCDEs: Counseled ABCDEs of melanoma: Asymmetry, Border (irregularity), Color (not uniform, changes in color), Diameter (greater than 6 mm which is about the size of a pencil eraser), and Evolving (any changes in preexisting moles).  - Sun protection: Counseled SPF 30+ sunscreen, UPF clothing, sun avoidance, tanning bed avoidance.    # Cherry angiomas  # Seborrheic keratoses  - We discussed the benign nature of the skin lesions. No treatment required. Continued observation recommended. Follow up with any concerns.      Follow-up:  Annual for follow up full body skin exam, as needed for new or changing lesions or new concerns    All risks, benefits and alternatives were discussed with  patient.  Patient is in agreement and understands the assessment and plan.  All questions were answered.  Mireya Hinton PA-C  [facility]    ____________________________________________    CC: Skin Check (Patient reports no new lesions of concern. The patient would like a full body skin check. )    HPI:  Ms. Shaye Brian is a(n) 63 year old female who presents today with her  as a new patient for a full body skin cancer screening. The patient reports a history of squamous cell carcinoma of the right lateral neck and right cheek, treated at an outside facility. No specific cutaneous concerns today. The patient reports trying to be diligent with photoprotection.      Physical Exam:  Vitals: LMP 12/05/2013   SKIN: Total skin excluding the genitalia areas was performed. The exam included the scalp, face, neck, bilateral arms, chest, back, abdomen, bilateral legs, digits, mons pubis, buttocks, and nails.   - More II.  - Pink macule(s) with gritty scale involving the left temple x1 and left helix x1, consistent with actinic keratosis.   - The right 3rd toe demonstrates a translucent papule, consistent with a digital mucus cyst.   - Multiple tan/brown macules and papules scattered throughout exam, consistent with benign nevi. No concerning features on dermoscopy.   - Scattered tan, homogenous macules scattered on sun exposed skin, consistent with solar lentigines.   - Scattered waxy, stuck on appearing papules and patches, consistent with seborrheic keratoses.    - Several 1-2 mm red dome shaped symmetric papules, consistent with cherry angiomas.     Medications:  No current outpatient medications on file.     No current facility-administered medications for this visit.      Past Medical History:   Patient Active Problem List   Diagnosis    Hyperlipidemia LDL goal <160    History of robot-assisted laparoscopic hysterectomy    Prediabetes    H/O bilateral salpingectomy     Past Medical  History:   Diagnosis Date    Mumps        CC Keyla Reed MD  [id number] [place] [address] on close of this encounter.

## 2024-02-02 NOTE — PATIENT INSTRUCTIONS
CRYOTHERAPY    What is it?  Use of a very cold liquid, such as liquid nitrogen, to freeze and destroy abnormal skin cells that need to be removed.    What should I expect?  Tenderness and redness.  A small blister that might grow and fill with dark purple blood.  More than one treatment may be needed if the lesions do not go away.    How do I care for the treated area?  Gently wash the area with your hands when bathing.  Use a thin layer of VaselineÆ to help with healing.   The area should heal within 7-10 days.  Do not use an antibiotic or NeosporinÆ ointment.   You may take acetaminophen (TylenolÆ) for pain.     Call your Doctor if you have:  Severe pain.  Signs of infection (warmth, redness, cloudy yellow drainage, and or a bad smell).  Questions or concerns.      Patient Education        Proper skin care from [facility]:     -Eliminate harsh soaps as they strip the natural oils from the skin, often resulting in dry itchy skin ( i.e. Dial, Zest, [place] Spring)  -Use mild soaps such as Cetaphil or Dove Sensitive Skin in the shower. You do not need to use soap on arms, legs, and trunk every time you shower unless visibly soiled.   -Avoid hot or cold showers.  -After showering, lightly dry off and apply moisturizing within 2-3 minutes. This will help trap moisture in the skin.   -Aggressive use of a moisturizer at least 1-2 times a day to the entire body (including -Vanicream, Cetaphil, Aquaphor or Cerave) and moisturize hands after every washing.  -We recommend using moisturizers that come in a tub that needs to be scooped out, not a pump. This has more of an oil base. It will hold moisture in your skin much better than a water base moisturizer. The above recommended are non-pore clogging.        Wear a sunscreen with at least SPF 30 on your face, ears, neck and V of the chest daily. Wear sunscreen on other areas of the body if those areas are exposed to the sun throughout the day. Sunscreens can contain  physical and/or chemical blockers. Physical blockers are less likely to clog pores, these include zinc oxide and titanium dioxide. Reapply every two hour and after swimming.      Sunscreen examples: https://www.ewg.org/sunscreen/     UV radiation  UVA radiation remains constant throughout the day and throughout the year. It is a longer wavelength than UVB and therefore penetrates deeper into the skin leading to immediate and delayed tanning, photoaging, and skin cancer. 70-80% of UVA and UVB radiation occurs between the hours of 10am-2pm.  UVB radiation  UVB radiation causes the most harmful effects and is more significant during the summer months. However, snow and ice can reflect UVB radiation leading to skin damage during the winter months as well. UVB radiation is responsible for tanning, burning, inflammation, delayed erythema (pinkness), pigmentation (brown spots), and skin cancer.      I recommend self monthly full body exams and yearly full body exams with a dermatology provider. If you develop a new or changing lesion please follow up for examination. Most skin cancers are pink and scaly or pink and pearly. However, we do see blue/brown/black skin cancers.  Consider the ABCDEs of melanoma when giving yourself your monthly full body exam ( don't forget the groin, buttocks, feet, toes, etc). A-asymmetry, B-borders, C-color, D-diameter, E-elevation or evolving. If you see any of these changes please follow up in clinic. If you cannot see your back I recommend purchasing a hand held mirror to use with a larger wall mirror.       Checking for Skin Cancer  You can find cancer early by checking your skin each month. There are 3 kinds of skin cancer. They are melanoma, basal cell carcinoma, and squamous cell carcinoma. Doing monthly skin checks is the best way to find new marks or skin changes. Follow the instructions below for checking your skin.   The ABCDEs of checking moles for melanoma   Check your moles or  growths for signs of melanoma using ABCDE:   Asymmetry: the sides of the mole or growth don t match  Border: the edges are ragged, notched, or blurred  Color: the color within the mole or growth varies  Diameter: the mole or growth is larger than 6 mm (size of a pencil eraser)  Evolving: the size, shape, or color of the mole or growth is changing (evolving is not shown in the images below)    Checking for other types of skin cancer  Basal cell carcinoma or squamous cell carcinoma have symptoms such as:      A spot or mole that looks different from all other marks on your skin  Changes in how an area feels, such as itching, tenderness, or pain  Changes in the skin's surface, such as oozing, bleeding, or scaliness  A sore that does not heal  New swelling or redness beyond the border of a mole     Who s at risk?  Anyone can get skin cancer. But you are at greater risk if you have:   Fair skin, light-colored hair, or light-colored eyes  Many moles or abnormal moles on your skin  A history of sunburns from sunlight or tanning beds  A family history of skin cancer  A history of exposure to radiation or chemicals  A weakened immune system  If you have had skin cancer in the past, you are at risk for recurring skin cancer.   How to check your skin  Do your monthly skin checkups in front of a full-length mirror. Check all parts of your body, including your:   Head (ears, face, neck, and scalp)  Torso (front, back, and sides)  Arms (tops, undersides, upper, and lower armpits)  Hands (palms, backs, and fingers, including under the nails)  Buttocks and genitals  Legs (front, back, and sides)  Feet (tops, soles, toes, including under the nails, and between toes)  If you have a lot of moles, take digital photos of them each month. Make sure to take photos both up close and from a distance. These can help you see if any moles change over time.   Most skin changes are not cancer. But if you see any changes in your skin, call your  doctor right away. Only he or she can diagnose a problem. If you have skin cancer, seeing your doctor can be the first step toward getting the treatment that could save your life.   [employer] last reviewed this educational content on 4/1/2019 2000-2020 [employer]. [address] [place] [address]. All rights reserved. This information is not intended as a substitute for professional medical care. Always follow your healthcare professional's instructions.        When should I call my doctor?  If you are worsening or not improving, please, contact us or seek urgent care as noted below.      Who should I call with questions (adults)?  [facility] (adult and pediatric): [phone]  [facility] (adult): [phone]  [facility] ([facility] and [place]) [phone]  For urgent needs outside of business hours call the [facility] at [phone] and ask for the dermatology resident on call to be paged  If this is a medical emergency and you are unable to reach an ER, Call [phone]        If you need a prescription refill, please contact your pharmacy. Refills are approved or denied by our Physicians during normal business hours, Monday through Fridays  Per office policy, refills will not be granted if you have not been seen within the past year (or sooner depending on your child's condition)

## 2024-02-08 ENCOUNTER — TELEPHONE (OUTPATIENT)
Dept: DERMATOLOGY | Facility: CLINIC | Age: 64
End: 2024-02-08

## 2024-02-08 NOTE — TELEPHONE ENCOUNTER
If you are interested in scheduling a follow up appointment with the dermatology clinic, please call us at [phone]. Thank you.

## 2024-05-01 ENCOUNTER — TELEPHONE (OUTPATIENT)
Dept: OTHER | Facility: CLINIC | Age: 64
End: 2024-05-01

## 2024-05-23 ENCOUNTER — OFFICE VISIT (OUTPATIENT)
Dept: FAMILY MEDICINE | Facility: CLINIC | Age: 64
End: 2024-05-23

## 2024-05-23 DIAGNOSIS — D49.2 NEOPLASM OF UNSPECIFIED BEHAVIOR OF BONE, SOFT TISSUE, AND SKIN: ICD-10-CM

## 2024-05-23 DIAGNOSIS — H01.134 ECZEMATOUS DERMATITIS OF UPPER EYELIDS OF BOTH EYES: Primary | ICD-10-CM

## 2024-05-23 DIAGNOSIS — L57.0 ACTINIC KERATOSES: ICD-10-CM

## 2024-05-23 DIAGNOSIS — L82.1 SEBORRHEIC KERATOSES: ICD-10-CM

## 2024-05-23 DIAGNOSIS — H01.131 ECZEMATOUS DERMATITIS OF UPPER EYELIDS OF BOTH EYES: Primary | ICD-10-CM

## 2024-05-23 PROCEDURE — 88305 TISSUE EXAM BY PATHOLOGIST: CPT | Performed by: PATHOLOGY

## 2024-05-23 PROCEDURE — 17000 DESTRUCT PREMALG LESION: CPT | Mod: XS | Performed by: PHYSICIAN ASSISTANT

## 2024-05-23 PROCEDURE — 11102 TANGNTL BX SKIN SINGLE LES: CPT | Performed by: PHYSICIAN ASSISTANT

## 2024-05-23 PROCEDURE — 99214 OFFICE O/P EST MOD 30 MIN: CPT | Mod: 25 | Performed by: PHYSICIAN ASSISTANT

## 2024-05-23 RX ORDER — ZINC OXIDE 13 %
CREAM (GRAM) TOPICAL
COMMUNITY
Start: 2024-05-23

## 2024-05-23 RX ORDER — CETIRIZINE HYDROCHLORIDE, PSEUDOEPHEDRINE HYDROCHLORIDE 5; 120 MG/1; MG/1
1 TABLET, FILM COATED, EXTENDED RELEASE ORAL 2 TIMES DAILY
COMMUNITY
Start: 2024-05-23

## 2024-05-23 RX ORDER — HYDROCORTISONE 25 MG/G
OINTMENT TOPICAL
Qty: 30 G | Refills: 4 | Status: SHIPPED | OUTPATIENT
Start: 2024-05-23

## 2024-05-23 RX ORDER — OMEGA-3 FATTY ACIDS/FISH OIL 300-1000MG
200 CAPSULE ORAL EVERY 4 HOURS PRN
COMMUNITY
Start: 2024-05-23

## 2024-05-23 ASSESSMENT — PAIN SCALES - GENERAL: PAINLEVEL: NO PAIN (0)

## 2024-05-23 NOTE — PATIENT INSTRUCTIONS
Patient Education        Proper skin care from [facility]:     -Eliminate harsh soaps as they strip the natural oils from the skin, often resulting in dry itchy skin ( i.e. Dial, Zest, [place] Spring)  -Use mild soaps such as Cetaphil or Dove Sensitive Skin in the shower. You do not need to use soap on arms, legs, and trunk every time you shower unless visibly soiled.   -Avoid hot or cold showers.  -After showering, lightly dry off and apply moisturizing within 2-3 minutes. This will help trap moisture in the skin.   -Aggressive use of a moisturizer at least 1-2 times a day to the entire body (including -Vanicream, Cetaphil, Aquaphor or Cerave) and moisturize hands after every washing.  -We recommend using moisturizers that come in a tub that needs to be scooped out, not a pump. This has more of an oil base. It will hold moisture in your skin much better than a water base moisturizer. The above recommended are non-pore clogging.        Wear a sunscreen with at least SPF 30 on your face, ears, neck and V of the chest daily. Wear sunscreen on other areas of the body if those areas are exposed to the sun throughout the day. Sunscreens can contain physical and/or chemical blockers. Physical blockers are less likely to clog pores, these include zinc oxide and titanium dioxide. Reapply every two hour and after swimming.      Sunscreen examples: https://www.ewg.org/sunscreen/     UV radiation  UVA radiation remains constant throughout the day and throughout the year. It is a longer wavelength than UVB and therefore penetrates deeper into the skin leading to immediate and delayed tanning, photoaging, and skin cancer. 70-80% of UVA and UVB radiation occurs between the hours of 10am-2pm.  UVB radiation  UVB radiation causes the most harmful effects and is more significant during the summer months. However, snow and ice can reflect UVB radiation leading to skin damage during the winter months as well. UVB radiation is  responsible for tanning, burning, inflammation, delayed erythema (pinkness), pigmentation (brown spots), and skin cancer.      I recommend self monthly full body exams and yearly full body exams with a dermatology provider. If you develop a new or changing lesion please follow up for examination. Most skin cancers are pink and scaly or pink and pearly. However, we do see blue/brown/black skin cancers.  Consider the ABCDEs of melanoma when giving yourself your monthly full body exam ( don't forget the groin, buttocks, feet, toes, etc). A-asymmetry, B-borders, C-color, D-diameter, E-elevation or evolving. If you see any of these changes please follow up in clinic. If you cannot see your back I recommend purchasing a hand held mirror to use with a larger wall mirror.       Checking for Skin Cancer  You can find cancer early by checking your skin each month. There are 3 kinds of skin cancer. They are melanoma, basal cell carcinoma, and squamous cell carcinoma. Doing monthly skin checks is the best way to find new marks or skin changes. Follow the instructions below for checking your skin.   The ABCDEs of checking moles for melanoma   Check your moles or growths for signs of melanoma using ABCDE:   Asymmetry: the sides of the mole or growth don t match  Border: the edges are ragged, notched, or blurred  Color: the color within the mole or growth varies  Diameter: the mole or growth is larger than 6 mm (size of a pencil eraser)  Evolving: the size, shape, or color of the mole or growth is changing (evolving is not shown in the images below)    Checking for other types of skin cancer  Basal cell carcinoma or squamous cell carcinoma have symptoms such as:      A spot or mole that looks different from all other marks on your skin  Changes in how an area feels, such as itching, tenderness, or pain  Changes in the skin's surface, such as oozing, bleeding, or scaliness  A sore that does not heal  New swelling or redness beyond  the border of a mole     Who s at risk?  Anyone can get skin cancer. But you are at greater risk if you have:   Fair skin, light-colored hair, or light-colored eyes  Many moles or abnormal moles on your skin  A history of sunburns from sunlight or tanning beds  A family history of skin cancer  A history of exposure to radiation or chemicals  A weakened immune system  If you have had skin cancer in the past, you are at risk for recurring skin cancer.   How to check your skin  Do your monthly skin checkups in front of a full-length mirror. Check all parts of your body, including your:   Head (ears, face, neck, and scalp)  Torso (front, back, and sides)  Arms (tops, undersides, upper, and lower armpits)  Hands (palms, backs, and fingers, including under the nails)  Buttocks and genitals  Legs (front, back, and sides)  Feet (tops, soles, toes, including under the nails, and between toes)  If you have a lot of moles, take digital photos of them each month. Make sure to take photos both up close and from a distance. These can help you see if any moles change over time.   Most skin changes are not cancer. But if you see any changes in your skin, call your doctor right away. Only he or she can diagnose a problem. If you have skin cancer, seeing your doctor can be the first step toward getting the treatment that could save your life.   [employer] last reviewed this educational content on 4/1/2019 2000-2020 [employer]. [address] [place] [address], [place] [address]. All rights reserved. This information is not intended as a substitute for professional medical care. Always follow your healthcare professional's instructions.        When should I call my doctor?  If you are worsening or not improving, please, contact us or seek urgent care as noted below.      Who should I call with questions (adults)?  [facility] (adult and pediatric): [phone]  [facility]  [place] (adult): [phone]  [facility] ([place], [place], [place] and [place]) [phone]  For urgent needs outside of business hours call the [facility] at [phone] and ask for the dermatology resident on call to be paged  If this is a medical emergency and you are unable to reach an ER, Call [phone]        If you need a prescription refill, please contact your pharmacy. Refills are approved or denied by our Physicians during normal business hours, Monday through Fridays  Per office policy, refills will not be granted if you have not been seen within the past year (or sooner depending on your child's condition)

## 2024-05-23 NOTE — PROGRESS NOTES
[facility] D[facility]atology Note  Encounter Date: May 23, 2024  Office Visit     Reviewed patients past medical history and pertinent chart review prior to patients visit today.     Dermatology Problem List:  # NUB, midline upper back, shave biopsy 5/23/2024     # Squamous cell carcinoma x2, right neck and right cheek, [facility]  # Actinic keratoses  - cryotherapy  # Digital mucus cyst  # Eyelid dermatitis  -hydrocortisone 2.5% ointment    ____________________________________________    Assessment & Plan:     #Actinic keratosis x1  - We discussed the precancerous nature of the skin lesions.  I recommended liquid nitrogen cryotherapy and the patient was agreeable.      Cryotherapy procedure note, location(s): right forehead x1 After verbal consent and discussion of risks and benefits including, but not limited to, dyspigmentation/scar, blister, and pain, the lesion(s) was(were) treated with 1-2 mm freeze border for 1-2 cycles with liquid nitrogen. Post cryotherapy instructions were provided.    # Neoplasm of uncertain behavior:  midline upper back  DDx includes ISK vs SCC. Shave biopsy today.    Procedure Note: Biopsy by shave technique  The risks and benefits of the procedure were described to the patient. These include but are not limited to bleeding, infection, scar, incomplete removal, and non-diagnostic biopsy. Verbal informed consent was obtained. The above site(s) was cleansed with an alcohol pad and injected with 1% lidocaine with epinephrine. Once anesthesia was obtained, a biopsy(ies) was performed with Gilette blade. The tissue(s) was placed in a labeled container(s) with formalin and sent to pathology. Hemostasis was achieved with aluminum chloride. Vaseline and a bandage were applied to the wound(s). The patient tolerated the procedure well and was given post biopsy care instructions.    # History of eyelid dermatitis  - Hydrocortisone 2.5% cream twice daily as needed for  flares, up to 1 week. We discussed potential side effects of topical steroids including skin atrophy.   - Sensitive skin care reviewed.     #Seborrheic keratoses  - The patient's lesions of concern involving the left neck, right hip, and ankles are consistent with seborrheic keratoses.   - We discussed the benign nature of the skin lesions. No treatment is required. I recommend continued observation with follow up should any concerning changes arise.       All risks, benefits and alternatives were discussed with patient.  Patient is in agreement and understands the assessment and plan.  All questions were answered.  Mireya Hinton PA-C  [facility]  _______________________________________    CC: Derm Problem (Spots on back, face, left ankle, right hip, and eyelids)    HPI:  Ms. Shaye Brian is a(n) 63 year old female who presents today as a return patient for multiple lesions of concerns.  She reports a area on her back has been itchy in recent weeks.  She also reports flaky, dry skin on her eyelids that improves with an old prescription of hydrocortisone.  Finally, she reports thickened areas on her right forehead, left neck, right hip and ankles.  These lesions are asymptomatic.  Patient is otherwise feeling well, without additional skin concerns.      Physical Exam:  SKIN: Focused examination of back, face, left elbow, right arm, and ankles was performed.  - Pink macule(s) with gritty scale involving the right forehead, consistent with actinic keratosis.   - Involving the midline upper back is a 0.8 x 0.8 cm pink papule with adherent scale.   - Waxy, stuck on appearing papule(s) throughout exam, consistent with seborrheic keratoses.       - No other lesions of concern on areas examined.     Medications:  Current Outpatient Medications   Medication Sig Dispense Refill    cetirizine-pseudoePHEDrine ER (ZYRTEC-D) 5-120 MG 12 hr tablet Take 1 tablet by mouth 2 times daily      ibuprofen  (ADVIL/MOTRIN) 200 MG capsule Take 1 capsule (200 mg) by mouth every 4 hours as needed for fever      Probiotic Product (PROBIOTIC DAILY) CAPS        No current facility-administered medications for this visit.      Past Medical History:   Patient Active Problem List   Diagnosis    Hyperlipidemia LDL goal <160    History of robot-assisted laparoscopic hysterectomy    Prediabetes    H/O bilateral salpingectomy     Past Medical History:   Diagnosis Date    Cancer (H)     Hx of squamous cell carcinoma     Mumps        CC Referred Self, MD  No address on file on close of this encounter.

## 2024-05-23 NOTE — LETTER
5/23/2024         RE: Shaye Brian  [address] [place] [address]        Dear Colleague,    Thank you for referring your patient, Shaye Brian, to the [facility]. Please see a copy of my visit note below.    [facility] Dermatology Note  Encounter Date: May 23, 2024  Office Visit     Reviewed patients past medical history and pertinent chart review prior to patients visit today.     Dermatology Problem List:  # NUB, midline upper back, shave biopsy 5/23/2024     # Squamous cell carcinoma x2, right neck and right cheek, [facility]  # Actinic keratoses  - cryotherapy  # Digital mucus cyst  # Eyelid dermatitis  -hydrocortisone 2.5% ointment    ____________________________________________    Assessment & Plan:     #Actinic keratosis x1  - We discussed the precancerous nature of the skin lesions.  I recommended liquid nitrogen cryotherapy and the patient was agreeable.      Cryotherapy procedure note, location(s): right forehead x1 After verbal consent and discussion of risks and benefits including, but not limited to, dyspigmentation/scar, blister, and pain, the lesion(s) was(were) treated with 1-2 mm freeze border for 1-2 cycles with liquid nitrogen. Post cryotherapy instructions were provided.    # Neoplasm of uncertain behavior:  midline upper back  DDx includes ISK vs SCC. Shave biopsy today.    Procedure Note: Biopsy by shave technique  The risks and benefits of the procedure were described to the patient. These include but are not limited to bleeding, infection, scar, incomplete removal, and non-diagnostic biopsy. Verbal informed consent was obtained. The above site(s) was cleansed with an alcohol pad and injected with 1% lidocaine with epinephrine. Once anesthesia was obtained, a biopsy(ies) was performed with Gilette blade. The tissue(s) was placed in a labeled container(s) with formalin and sent to pathology. Hemostasis was achieved  with aluminum chloride. Vaseline and a bandage were applied to the wound(s). The patient tolerated the procedure well and was given post biopsy care instructions.    # History of eyelid dermatitis  - Hydrocortisone 2.5% cream twice daily as needed for flares, up to 1 week. We discussed potential side effects of topical steroids including skin atrophy.   - Sensitive skin care reviewed.     #Seborrheic keratoses  - The patient's lesions of concern involving the left neck, right hip, and ankles are consistent with seborrheic keratoses.   - We discussed the benign nature of the skin lesions. No treatment is required. I recommend continued observation with follow up should any concerning changes arise.       All risks, benefits and alternatives were discussed with patient.  Patient is in agreement and understands the assessment and plan.  All questions were answered.  Mireya Hinton PA-C  [facility]  _______________________________________    CC: Derm Problem (Spots on back, face, left ankle, right hip, and eyelids)    HPI:  Ms. Shaye Brina is a(n) 63 year old female who presents today as a return patient for multiple lesions of concerns.  She reports a area on her back has been itchy in recent weeks.  She also reports flaky, dry skin on her eyelids that improves with an old prescription of hydrocortisone.  Finally, she reports thickened areas on her right forehead, left neck, right hip and ankles.  These lesions are asymptomatic.  Patient is otherwise feeling well, without additional skin concerns.      Physical Exam:  SKIN: Focused examination of back, face, left elbow, right arm, and ankles was performed.  - Pink macule(s) with gritty scale involving the right forehead, consistent with actinic keratosis.   - Involving the midline upper back is a 0.8 x 0.8 cm pink papule with adherent scale.   - Waxy, stuck on appearing papule(s) throughout exam, consistent with seborrheic keratoses.       - No  other lesions of concern on areas examined.     Medications:  Current Outpatient Medications   Medication Sig Dispense Refill     cetirizine-pseudoePHEDrine ER (ZYRTEC-D) 5-120 MG 12 hr tablet Take 1 tablet by mouth 2 times daily       ibuprofen (ADVIL/MOTRIN) 200 MG capsule Take 1 capsule (200 mg) by mouth every 4 hours as needed for fever       Probiotic Product (PROBIOTIC DAILY) CAPS        No current facility-administered medications for this visit.      Past Medical History:   Patient Active Problem List   Diagnosis     Hyperlipidemia LDL goal <160     History of robot-assisted laparoscopic hysterectomy     Prediabetes     H/O bilateral salpingectomy     Past Medical History:   Diagnosis Date     Cancer (H)      Hx of squamous cell carcinoma      Mumps        CC Referred Self, MD  No address on file on close of this encounter.       Again, thank you for allowing me to participate in the care of your patient.        Sincerely,        Mireya Hinton PA-C

## 2024-05-28 LAB
PATH REPORT.COMMENTS IMP SPEC: NORMAL
PATH REPORT.COMMENTS IMP SPEC: NORMAL
PATH REPORT.FINAL DX SPEC: NORMAL
PATH REPORT.GROSS SPEC: NORMAL
PATH REPORT.MICROSCOPIC SPEC OTHER STN: NORMAL
PATH REPORT.RELEVANT HX SPEC: NORMAL

## 2024-08-28 ENCOUNTER — OFFICE VISIT (OUTPATIENT)
Dept: URGENT CARE | Facility: URGENT CARE | Age: 64
End: 2024-08-28

## 2024-08-28 VITALS
TEMPERATURE: 99 F | HEART RATE: 77 BPM | OXYGEN SATURATION: 98 % | DIASTOLIC BLOOD PRESSURE: 75 MMHG | SYSTOLIC BLOOD PRESSURE: 125 MMHG

## 2024-08-28 DIAGNOSIS — N30.00 ACUTE CYSTITIS WITHOUT HEMATURIA: Primary | ICD-10-CM

## 2024-08-28 LAB
ALBUMIN UR-MCNC: NEGATIVE MG/DL
APPEARANCE UR: CLEAR
BILIRUB UR QL STRIP: NEGATIVE
COLOR UR AUTO: YELLOW
GLUCOSE UR STRIP-MCNC: NEGATIVE MG/DL
HGB UR QL STRIP: ABNORMAL
KETONES UR STRIP-MCNC: NEGATIVE MG/DL
LEUKOCYTE ESTERASE UR QL STRIP: ABNORMAL
NITRATE UR QL: NEGATIVE
PH UR STRIP: 5.5 [PH] (ref 5–7)
RBC #/AREA URNS AUTO: ABNORMAL /HPF
SP GR UR STRIP: 1.02 (ref 1–1.03)
SQUAMOUS #/AREA URNS AUTO: ABNORMAL /LPF
UROBILINOGEN UR STRIP-ACNC: 0.2 E.U./DL
WBC #/AREA URNS AUTO: ABNORMAL /HPF

## 2024-08-28 PROCEDURE — 87086 URINE CULTURE/COLONY COUNT: CPT | Performed by: FAMILY MEDICINE

## 2024-08-28 PROCEDURE — 99213 OFFICE O/P EST LOW 20 MIN: CPT | Performed by: FAMILY MEDICINE

## 2024-08-28 PROCEDURE — 81001 URINALYSIS AUTO W/SCOPE: CPT | Performed by: FAMILY MEDICINE

## 2024-08-28 RX ORDER — AMOXICILLIN 500 MG/1
CAPSULE ORAL
COMMUNITY
Start: 2024-06-14

## 2024-08-28 RX ORDER — NITROFURANTOIN 25; 75 MG/1; MG/1
100 CAPSULE ORAL 2 TIMES DAILY
Qty: 10 CAPSULE | Refills: 0 | Status: SHIPPED | OUTPATIENT
Start: 2024-08-28 | End: 2024-09-02

## 2024-08-28 NOTE — PATIENT INSTRUCTIONS
Take antibiotic nitrofurantoin/macrobid twice a day for 5 days    Drink approximately 100 ounces of water a day to stay hydrated.     If you have not seen improvement within 48 hours please call your Doctor's office or return to be evaluated.   If symptoms have worsened seek help right away  If you develop flank pain, fevers, nausea/vomiting seek help right away      Your urine culture should return in the next 2-3 days to confirm if there is an infection.   --if the antibiotic needs to be changed our team will reach out to you to discuss  --if the culture does not specifically grow an organism and your symptoms have resolved it is recommended that you stop the antibiotic

## 2024-08-28 NOTE — PROGRESS NOTES
Assessment & Plan     Acute cystitis without hematuria  - UA Macroscopic with reflex to Microscopic and Culture  - UA Microscopic with Reflex to Culture  - Urine Culture  - nitroFURantoin macrocrystal-monohydrate (MACROBID) 100 MG capsule  Dispense: 10 capsule; Refill: 0     No suggestion of pyelonephritis this appears to be a first-time episode of UTI in the last year which appears uncomplicated. We discussed the amoxicillin therapy/beta-lactam antibiotics have low success in treating infections due to resistance developed by most common organisms that cause infection    Proceed with 5 day therapy macrobid    See AVS summary for additional recommendations reviewed with patient during this visit.         Lamberto Siegel MD   [facility]    Subjective     Shaye is a 63 year old female who presents to clinic today for the following health issues:  Chief Complaint   Patient presents with    Urgent Care     Dysruia, freq, urgency x 1 d-  pt took 3 amoxicillin tabs that she has for prophylactic dental procedure     HPI    No recent history of recurrent urinary infections although > 10 years ago she did see urology who did cystoscopy which showed normal bladder tissue. No hx of  smoking.  She has not been on topical estrogen therapy in the past.      Feels like her symptoms were mild when present 3 weeks ago she took some oral amoxicillin which took the edge off over the last couple days she developed symptoms as noted above.  She has not had any flank pain, fever, nausea or vomiting.  Last bladder infection was approximately a year ago    No hx of pyelonephritis noted      Patient Active Problem List    Diagnosis Date Noted    History of robot-assisted laparoscopic hysterectomy 12/07/2018     Priority: Medium    Prediabetes 12/07/2018     Priority: Medium    H/O bilateral salpingectomy 12/27/2013     Priority: Medium    Hyperlipidemia LDL goal <160 05/06/2013     Priority: Medium       Current Outpatient  Medications   Medication Sig Dispense Refill    amoxicillin (AMOXIL) 500 MG capsule TAKE 4 CAPSULES BY MOUTH 1 HOUR PRIOR TO DENTAL APPOINTMENT      cetirizine-pseudoePHEDrine ER (ZYRTEC-D) 5-120 MG 12 hr tablet Take 1 tablet by mouth 2 times daily      ibuprofen (ADVIL/MOTRIN) 200 MG capsule Take 1 capsule (200 mg) by mouth every 4 hours as needed for fever      nitroFURantoin macrocrystal-monohydrate (MACROBID) 100 MG capsule Take 1 capsule (100 mg) by mouth 2 times daily for 5 days. 10 capsule 0    Probiotic Product (PROBIOTIC DAILY) CAPS       hydrocortisone 2.5 % ointment Apply to the rash on the face twice daily for 7 days. Restart if rash flares. (Patient not taking: Reported on 8/28/2024) 30 g 4     No current facility-administered medications for this visit.           Objective    /75   Pulse 77   Temp 99  F (37.2  C) (Tympanic)   LMP 12/05/2013   SpO2 98%   Physical Exam       GEN: NAD  Results for orders placed or performed in visit on 08/28/24   UA Macroscopic with reflex to Microscopic and Culture     Status: Abnormal    Specimen: Urine, Clean Catch   Result Value Ref Range    Color Urine Yellow Colorless, Straw, Light Yellow, Yellow    Appearance Urine Clear Clear    Glucose Urine Negative Negative mg/dL    Bilirubin Urine Negative Negative    Ketones Urine Negative Negative mg/dL    Specific Gravity Urine 1.020 1.003 - 1.035    Blood Urine Small (A) Negative    pH Urine 5.5 5.0 - 7.0    Protein Albumin Urine Negative Negative mg/dL    Urobilinogen Urine 0.2 0.2, 1.0 E.U./dL    Nitrite Urine Negative Negative    Leukocyte Esterase Urine Small (A) Negative   UA Microscopic with Reflex to Culture     Status: Abnormal   Result Value Ref Range    RBC Urine 2-5 (A) 0-2 /HPF /HPF    WBC Urine 25-50 (A) 0-5 /HPF /HPF    Squamous Epithelials Urine Few (A) None Seen /LPF                     The use of Dragon/ClusterFlunk dictation services may have been used to construct the content in this note; any  grammatical or spelling errors are non-intentional. Please contact the author of this note directly if you are in need of any clarification.

## 2024-08-30 LAB — BACTERIA UR CULT: NO GROWTH

## 2024-10-11 ENCOUNTER — OFFICE VISIT (OUTPATIENT)
Dept: FAMILY MEDICINE | Facility: CLINIC | Age: 64
End: 2024-10-11

## 2024-10-11 VITALS
HEART RATE: 77 BPM | WEIGHT: 189 LBS | BODY MASS INDEX: 29.66 KG/M2 | DIASTOLIC BLOOD PRESSURE: 69 MMHG | TEMPERATURE: 97.5 F | SYSTOLIC BLOOD PRESSURE: 105 MMHG | HEIGHT: 67 IN | RESPIRATION RATE: 14 BRPM | OXYGEN SATURATION: 95 %

## 2024-10-11 DIAGNOSIS — M25.511 ACUTE PAIN OF RIGHT SHOULDER: Primary | ICD-10-CM

## 2024-10-11 DIAGNOSIS — G44.209 TENSION HEADACHE: ICD-10-CM

## 2024-10-11 DIAGNOSIS — H69.93 DYSFUNCTION OF BOTH EUSTACHIAN TUBES: ICD-10-CM

## 2024-10-11 DIAGNOSIS — Z63.79 STRESS DUE TO ILLNESS OF FAMILY MEMBER: ICD-10-CM

## 2024-10-11 DIAGNOSIS — R42 LIGHTHEADEDNESS: ICD-10-CM

## 2024-10-11 LAB
ALBUMIN SERPL BCG-MCNC: 4.4 G/DL (ref 3.5–5.2)
ALP SERPL-CCNC: 89 U/L (ref 40–150)
ALT SERPL W P-5'-P-CCNC: 28 U/L (ref 0–50)
ANION GAP SERPL CALCULATED.3IONS-SCNC: 10 MMOL/L (ref 7–15)
AST SERPL W P-5'-P-CCNC: 25 U/L (ref 0–45)
BILIRUB SERPL-MCNC: 0.3 MG/DL
BUN SERPL-MCNC: 20.1 MG/DL (ref 8–23)
CALCIUM SERPL-MCNC: 10.2 MG/DL (ref 8.8–10.4)
CHLORIDE SERPL-SCNC: 105 MMOL/L (ref 98–107)
CREAT SERPL-MCNC: 0.77 MG/DL (ref 0.51–0.95)
EGFRCR SERPLBLD CKD-EPI 2021: 86 ML/MIN/1.73M2
ERYTHROCYTE [DISTWIDTH] IN BLOOD BY AUTOMATED COUNT: 12.9 % (ref 10–15)
GLUCOSE SERPL-MCNC: 106 MG/DL (ref 70–99)
HCO3 SERPL-SCNC: 25 MMOL/L (ref 22–29)
HCT VFR BLD AUTO: 41.5 % (ref 35–47)
HGB BLD-MCNC: 13.1 G/DL (ref 11.7–15.7)
MAGNESIUM SERPL-MCNC: 2.3 MG/DL (ref 1.7–2.3)
MCH RBC QN AUTO: 30.3 PG (ref 26.5–33)
MCHC RBC AUTO-ENTMCNC: 31.6 G/DL (ref 31.5–36.5)
MCV RBC AUTO: 96 FL (ref 78–100)
PLATELET # BLD AUTO: 334 10E3/UL (ref 150–450)
POTASSIUM SERPL-SCNC: 5.2 MMOL/L (ref 3.4–5.3)
PROT SERPL-MCNC: 7.5 G/DL (ref 6.4–8.3)
RBC # BLD AUTO: 4.32 10E6/UL (ref 3.8–5.2)
SODIUM SERPL-SCNC: 140 MMOL/L (ref 135–145)
T4 FREE SERPL-MCNC: 1.05 NG/DL (ref 0.9–1.7)
TSH SERPL DL<=0.005 MIU/L-ACNC: 2.21 UIU/ML (ref 0.3–4.2)
WBC # BLD AUTO: 6.7 10E3/UL (ref 4–11)

## 2024-10-11 PROCEDURE — 84443 ASSAY THYROID STIM HORMONE: CPT | Performed by: FAMILY MEDICINE

## 2024-10-11 PROCEDURE — 80053 COMPREHEN METABOLIC PANEL: CPT | Performed by: FAMILY MEDICINE

## 2024-10-11 PROCEDURE — 85027 COMPLETE CBC AUTOMATED: CPT | Performed by: FAMILY MEDICINE

## 2024-10-11 PROCEDURE — G2211 COMPLEX E/M VISIT ADD ON: HCPCS | Performed by: FAMILY MEDICINE

## 2024-10-11 PROCEDURE — 83735 ASSAY OF MAGNESIUM: CPT | Performed by: FAMILY MEDICINE

## 2024-10-11 PROCEDURE — 99214 OFFICE O/P EST MOD 30 MIN: CPT | Performed by: FAMILY MEDICINE

## 2024-10-11 PROCEDURE — 36415 COLL VENOUS BLD VENIPUNCTURE: CPT | Performed by: FAMILY MEDICINE

## 2024-10-11 PROCEDURE — 84439 ASSAY OF FREE THYROXINE: CPT | Performed by: FAMILY MEDICINE

## 2024-10-11 RX ORDER — FLUTICASONE PROPIONATE 50 MCG
1 SPRAY, SUSPENSION (ML) NASAL DAILY
Qty: 16 G | Refills: 5 | Status: SHIPPED | OUTPATIENT
Start: 2024-10-11 | End: 2024-10-11

## 2024-10-11 RX ORDER — FLUTICASONE PROPIONATE 50 MCG
1 SPRAY, SUSPENSION (ML) NASAL DAILY
Qty: 16 G | Refills: 5 | Status: SHIPPED | OUTPATIENT
Start: 2024-10-11

## 2024-10-11 NOTE — PATIENT INSTRUCTIONS
For the ears ---  Try Claritin D and flonase (both nostrils twice daily)    For the eyes ---  Schedule eye exam    For the shoulder ---   Schedule with PT

## 2024-10-11 NOTE — PROGRESS NOTES
"  Assessment & Plan     Acute pain of right shoulder - ongoing for the past several months, improving since she started doing chair yoga. Unable to fully lift arm over head, feels pain and weakness. No trauma to the arm. Suggested formal evaluation and treatment through PT to start, will get advanced imaging if needed in future.   - Physical Therapy  Referral; Future    Dysfunction of both eustachian tubes - noted on exam today, discussed treatment measures as below.  - loratadine-pseudoePHEDrine (CLARITIN-D 24-HOUR)  MG 24 hr tablet; Take 1 tablet by mouth daily.  - fluticasone (FLONASE) 50 MCG/ACT nasal spray; Spray 1 spray into both nostrils daily.    Lightheadedness - above likely contributing. Will run labs as below to ensure nothing more contributing.   - Magnesium; Future  - Comprehensive metabolic panel (BMP + Alb, Alk Phos, ALT, AST, Total. Bili, TP); Future  - TSH; Future  - T4, free; Future  - CBC with platelets; Future  - Magnesium  - Comprehensive metabolic panel (BMP + Alb, Alk Phos, ALT, AST, Total. Bili, TP)  - TSH  - T4, free  - CBC with platelets    Tension headache - discussed diagnosis. Likely related to her husbands health condition and her worry. Will continue to monitor. Suggested CBT to help improve coping strategies.     Stress due to illness of family member  - Adult Mental Health  Referral; Future    The longitudinal plan of care for the diagnosis(es)/condition(s) as documented were addressed during this visit. Due to the added complexity in care, I will continue to support Shaye in the subsequent management and with ongoing continuity of care.    BMI  Estimated body mass index is 29.6 kg/m  as calculated from the following:    Height as of this encounter: 1.702 m (5' 7\").    Weight as of this encounter: 85.7 kg (189 lb).         Subjective   Shaey is a 63 year old, presenting for the following health issues:  Headache and Dizziness        10/11/2024     7:10 AM " "  Additional Questions   Roomed by Brandin Page   Accompanied by self     History of Present Illness       Headaches:   Since the patient's last clinic visit, headaches are: no change  The patient is getting headaches:  2 or 3times a week  She is able to do normal daily activities when she has a migraine.  The patient is taking the following rescue/relief medications:  Ibuprofen (Advil, Motrin) and Tylenol   Patient states \"I get total relief\" from the rescue/relief medications.   The patient is taking the following medications to prevent migraines:  No medications to prevent migraines  In the past 4 weeks, the patient has gone to an Urgent Care or Emergency Room 0 times times due to headaches.    Reason for visit:  Dizziness lightheadness  Symptom onset:  More than a month  Symptoms include:  Headache dizzy pain in neck  Symptom intensity:  Mild  Symptom progression:  Staying the same  Had these symptoms before:  No   She is taking medications regularly.           Review of Systems  Constitutional, neuro, ENT, endocrine, pulmonary, cardiac, gastrointestinal, genitourinary, musculoskeletal, integument and psychiatric systems are negative, except as otherwise noted.      Objective    /69 (BP Location: Right arm, Patient Position: Chair, Cuff Size: Adult Regular)   Pulse 77   Temp 97.5  F (36.4  C) (Oral)   Resp 14   Ht 1.702 m (5' 7\")   Wt 85.7 kg (189 lb)   LMP 12/05/2013   SpO2 95%   Breastfeeding No   BMI 29.60 kg/m    Body mass index is 29.6 kg/m .  Physical Exam   GENERAL: alert and no distress  EYES: Eyes grossly normal to inspection, PERRL and conjunctivae and sclerae normal  HENT: ear canals and TM's normal, nose and mouth without ulcers or lesions  NECK: no adenopathy, no asymmetry, masses, or scars  RESP: lungs clear to auscultation - no rales, rhonchi or wheezes  CV: regular rate and rhythm, normal S1 S2, no S3 or S4, no murmur, click or rub, no peripheral edema  ABDOMEN: soft, nontender, no " hepatosplenomegaly, no masses and bowel sounds normal  NEURO: Normal strength and tone, sensory exam grossly normal, mentation intact, cranial nerves 2-12 intact, Romberg normal, and rapid alternating movements normal  PSYCH: mentation appears normal, affect normal/bright        Signed Electronically by: Keyla Reed MD

## 2024-10-16 ENCOUNTER — MYC MEDICAL ADVICE (OUTPATIENT)
Dept: FAMILY MEDICINE | Facility: CLINIC | Age: 64
End: 2024-10-16

## 2024-10-20 NOTE — PROGRESS NOTES
PHYSICAL THERAPY EVALUATION  Type of Visit: Evaluation       Fall Risk Screen:  Fall screen completed by: PT  Have you fallen 2 or more times in the past year?: No  Have you fallen and had an injury in the past year?: No  Is patient a fall risk?: No    Subjective       Presenting condition or subjective complaint: stuck shoulder??  Date of onset: 10/21/24    Relevant medical history: Anemia; Arthritis; Menopause; Migraines or headaches; Osteoarthritis   Dates & types of surgery: 9/12/22 &9/12/23 hip replacement; hysterectomy 2011    Prior diagnostic imaging/testing results:       Prior therapy history for the same diagnosis, illness or injury: No      KEY PT FINDINGS:  1) Moderate active and passive ROM loss in shoulder external rotation  2) Moderate active and passive ROM loss in shoulder abduction  3) Pain with palpation to infraspinatus musculature    Physical Therapy Initial Evaluation: Subjective History     Injury/Condition Details:  Presenting Complaint Patient presents to therapy with R shoulder pain - effecting patient for the past 5-6 months. Patient started feeling like she couldn't lift her arm above head. It began with pain and soon after progressed to ROM deficits. Patient also has increased R UT pain and tightness. She will occasionally experience shooting sensations down the arm into the hand. She has been doing some chair yoga and reports that has helped.    Onset Timing/Date 5-6 months ago   Mechanism Gradual onset     Symptom Behavior Details    Primary Symptoms Sporadic symptoms; Activity/position dependent   Worst Pain 7/10 (with reaching)   Symptom Provocators Reaching   Best Pain 1/10    Symptom Relievers Ibuprofen, supporting the arm   Time of day dependent? No   Recent symptom change? symptoms improving     Prior Testing/Intervention for current condition:  Prior Tests  No   Prior Treatment none     Lifestyle & General Medical History:  Employment Property management   Usual physical  activities  (within past year) No   Orthopaedic history No   Notable medical history See Epic Chart   Patient Reported Health good        Living Environment  Social support: With a significant other or spouse   [place] [facility] [facility]   Stairs to enter the home: Yes 24 Is there a railing: Yes     Ramp: No   Stairs inside the home: Yes 24 Is there a railing: Yes     Help at home: None  Equipment owned:       Employment: Yes realtor, property management  Hobbies/Interests: reading, walking, exercise, nutrition, spending time with family    Patient goals for therapy: full movement       Objective     SHOULDER:    Standing Posture: Rounded shoulders, thoracic kyphosis     Cervical Screen: Limitations in cervical rotation L>R and sidebend L>R    T-Spine Mobility:  Not assessed today    Shoulder: (* indicates patient's pain)   PROM L PROM R AROM L AROM R MMT L MMT R   Flex  148 150 140  **   Abd  110 145 80  *   IR  70 70 65     ER  55 75 50  *   Ext/IR           Palpation: Tenderness to palpation of posterior shoulder - infraspinatus in particular    Special tests:   L R   Impingement     Neer's     Hawkin's-Casper     Coracoid Impingement     Internal impingement     Labral     Anterior Slide     Bismarck's     Crank     Instability     Apprehension (anterior)     Relocation (anterior)     Anterior Load & Shift     Posterior Load & Shift     Posterior instability (with 90 degrees flex)     Multi-Directional Instability      Sulcus     Biceps      Speed's     Rotator Cuff Tear     Drop Arm  +   Belly Press  -   Lift off   +     Positive external lag  Positive empty can       Assessment & Plan   CLINICAL IMPRESSIONS  Medical Diagnosis: Acute pain of right shoulder (M25.511)    Treatment Diagnosis: R shoulder pain   Impression/Assessment: Patient is a 64 year old female with R shoulder complaints.  The following significant findings have been identified: Pain, Decreased ROM/flexibility, Decreased joint  mobility, Decreased strength, Impaired muscle performance, and Decreased activity tolerance. These impairments interfere with their ability to perform self care tasks, work tasks, and recreational activities as compared to previous level of function.     Clinical Decision Making (Complexity):  Clinical Presentation: Stable/Uncomplicated  Clinical Presentation Rationale: based on medical and personal factors listed in PT evaluation  Clinical Decision Making (Complexity): Low complexity    PLAN OF CARE  Treatment Interventions:  Modalities: Hot Pack  Interventions: Manual Therapy, Neuromuscular Re-education, Therapeutic Activity, Therapeutic Exercise, Self-Care/Home Management    Long Term Goals     PT Goal 1  Goal Identifier: Reaching  Goal Description: Patient able to reach above head (>160 degrees) with <2/10 pain in R shoulder  Rationale: to maximize safety and independence with performance of ADLs and functional tasks;to maximize safety and independence with transportation  Target Date: 12/30/24  PT Goal 2  Goal Identifier: Self cares  Goal Description: Patient will be able to complete dressing and bathing with <2/10 pain in the R shoulder  Rationale: to maximize safety and independence with performance of ADLs and functional tasks;to maximize safety and independence with self cares  Target Date: 11/25/24      Frequency of Treatment: 1x / week  Duration of Treatment: 10 weeks    Recommended Referrals to Other Professionals:  Patient appropriate for physical therapy,  no referral required at this time  Education Assessment:   Learner/Method: Patient;No Barriers to Learning  Education Comments: Discussed relevant shoulder anatomy, exercise progression, and goals of physcial therapy    Risks and benefits of evaluation/treatment have been explained.   Patient/Family/caregiver agrees with Plan of Care.     Evaluation Time:     PT Eval, Low Complexity Minutes (30161): 25     Signing Clinician: Jose Miguel Taylor,  PT

## 2024-10-21 ENCOUNTER — THERAPY VISIT (OUTPATIENT)
Dept: PHYSICAL THERAPY | Facility: CLINIC | Age: 64
End: 2024-10-21
Attending: FAMILY MEDICINE

## 2024-10-21 DIAGNOSIS — M25.511 ACUTE PAIN OF RIGHT SHOULDER: ICD-10-CM

## 2024-10-21 PROCEDURE — 97161 PT EVAL LOW COMPLEX 20 MIN: CPT | Mod: GP

## 2024-10-21 PROCEDURE — 97110 THERAPEUTIC EXERCISES: CPT | Mod: GP

## 2024-10-21 ASSESSMENT — ACTIVITIES OF DAILY LIVING (ADL)
REACHING_FOR_SOMETHING_ON_A_HIGH_SHELF: 6
PUTTING_ON_AN_UNDERSHIRT_OR_A_PULLOVER_SWEATER: 4
PLEASE_INDICATE_YOR_PRIMARY_REASON_FOR_REFERRAL_TO_THERAPY:: SHOULDER
WHEN_LYING_ON_THE_INVOLVED_SIDE: 5
PUTTING_ON_A_SHIRT_THAT_BUTTONS_DOWN_THE_FRONT: 5
WASHING_YOUR_BACK: 6
AT_ITS_WORST?: 6
PLACING_AN_OBJECT_ON_A_HIGH_SHELF: 6
CARRYING_A_HEAVY_OBJECT_OF_10_POUNDS: 5
WASHING_YOUR_HAIR?: 4
PUSHING_WITH_THE_INVOLVED_ARM: 6
REMOVING_SOMETHING_FROM_YOUR_BACK_POCKET: 0
PUTTING_ON_YOUR_PANTS: 0
TOUCHING_THE_BACK_OF_YOUR_NECK: 5

## 2024-11-14 ENCOUNTER — THERAPY VISIT (OUTPATIENT)
Dept: PHYSICAL THERAPY | Facility: CLINIC | Age: 64
End: 2024-11-14

## 2024-11-14 DIAGNOSIS — M25.511 ACUTE PAIN OF RIGHT SHOULDER: Primary | ICD-10-CM

## 2024-11-14 PROCEDURE — 97110 THERAPEUTIC EXERCISES: CPT | Mod: GP

## 2024-11-14 PROCEDURE — 97140 MANUAL THERAPY 1/> REGIONS: CPT | Mod: GP

## 2024-11-18 ENCOUNTER — OFFICE VISIT (OUTPATIENT)
Dept: OBGYN | Facility: CLINIC | Age: 64
End: 2024-11-18

## 2024-11-18 VITALS
BODY MASS INDEX: 30.07 KG/M2 | HEIGHT: 67 IN | SYSTOLIC BLOOD PRESSURE: 100 MMHG | WEIGHT: 191.6 LBS | DIASTOLIC BLOOD PRESSURE: 70 MMHG

## 2024-11-18 DIAGNOSIS — N95.2 VAGINAL ATROPHY: ICD-10-CM

## 2024-11-18 DIAGNOSIS — F32.A DEPRESSION, UNSPECIFIED DEPRESSION TYPE: Primary | ICD-10-CM

## 2024-11-18 DIAGNOSIS — F43.9 STRESS: ICD-10-CM

## 2024-11-18 DIAGNOSIS — Z01.419 ENCOUNTER FOR GYNECOLOGICAL EXAMINATION (GENERAL) (ROUTINE) WITHOUT ABNORMAL FINDINGS: ICD-10-CM

## 2024-11-18 LAB
ESTRADIOL SERPL-MCNC: 6 PG/ML
FSH SERPL IRP2-ACNC: 32.4 MIU/ML
LH SERPL-ACNC: 20.5 MIU/ML
MIS SERPL-MCNC: <0.03 NG/ML
PROGEST SERPL-MCNC: 0.1 NG/ML

## 2024-11-18 PROCEDURE — 99459 PELVIC EXAMINATION: CPT | Performed by: FAMILY MEDICINE

## 2024-11-18 PROCEDURE — 82627 DEHYDROEPIANDROSTERONE: CPT | Performed by: FAMILY MEDICINE

## 2024-11-18 PROCEDURE — 82157 ASSAY OF ANDROSTENEDIONE: CPT | Mod: 90 | Performed by: FAMILY MEDICINE

## 2024-11-18 PROCEDURE — 82166 ASSAY ANTI-MULLERIAN HORM: CPT | Performed by: FAMILY MEDICINE

## 2024-11-18 PROCEDURE — 84403 ASSAY OF TOTAL TESTOSTERONE: CPT | Performed by: FAMILY MEDICINE

## 2024-11-18 PROCEDURE — 99000 SPECIMEN HANDLING OFFICE-LAB: CPT | Performed by: FAMILY MEDICINE

## 2024-11-18 PROCEDURE — 36415 COLL VENOUS BLD VENIPUNCTURE: CPT | Performed by: FAMILY MEDICINE

## 2024-11-18 PROCEDURE — 84144 ASSAY OF PROGESTERONE: CPT | Performed by: FAMILY MEDICINE

## 2024-11-18 PROCEDURE — 99213 OFFICE O/P EST LOW 20 MIN: CPT | Mod: 25 | Performed by: FAMILY MEDICINE

## 2024-11-18 PROCEDURE — 83002 ASSAY OF GONADOTROPIN (LH): CPT | Performed by: FAMILY MEDICINE

## 2024-11-18 PROCEDURE — 83001 ASSAY OF GONADOTROPIN (FSH): CPT | Performed by: FAMILY MEDICINE

## 2024-11-18 PROCEDURE — 99386 PREV VISIT NEW AGE 40-64: CPT | Performed by: FAMILY MEDICINE

## 2024-11-18 PROCEDURE — 82670 ASSAY OF TOTAL ESTRADIOL: CPT | Performed by: FAMILY MEDICINE

## 2024-11-18 RX ORDER — ESTRADIOL 0.1 MG/G
2 CREAM VAGINAL
Qty: 70 G | Refills: 11 | Status: SHIPPED | OUTPATIENT
Start: 2024-11-18

## 2024-11-18 RX ORDER — BUPROPION HYDROCHLORIDE 150 MG/1
150 TABLET ORAL EVERY MORNING
Qty: 90 TABLET | Refills: 3 | Status: SHIPPED | OUTPATIENT
Start: 2024-11-18

## 2024-11-18 ASSESSMENT — ANXIETY QUESTIONNAIRES
7. FEELING AFRAID AS IF SOMETHING AWFUL MIGHT HAPPEN: SEVERAL DAYS
GAD7 TOTAL SCORE: 7
3. WORRYING TOO MUCH ABOUT DIFFERENT THINGS: SEVERAL DAYS
GAD7 TOTAL SCORE: 7
IF YOU CHECKED OFF ANY PROBLEMS ON THIS QUESTIONNAIRE, HOW DIFFICULT HAVE THESE PROBLEMS MADE IT FOR YOU TO DO YOUR WORK, TAKE CARE OF THINGS AT HOME, OR GET ALONG WITH OTHER PEOPLE: SOMEWHAT DIFFICULT
1. FEELING NERVOUS, ANXIOUS, OR ON EDGE: SEVERAL DAYS
5. BEING SO RESTLESS THAT IT IS HARD TO SIT STILL: SEVERAL DAYS
2. NOT BEING ABLE TO STOP OR CONTROL WORRYING: SEVERAL DAYS
6. BECOMING EASILY ANNOYED OR IRRITABLE: SEVERAL DAYS

## 2024-11-18 ASSESSMENT — PATIENT HEALTH QUESTIONNAIRE - PHQ9
5. POOR APPETITE OR OVEREATING: SEVERAL DAYS
SUM OF ALL RESPONSES TO PHQ QUESTIONS 1-9: 6

## 2024-11-18 NOTE — PATIENT INSTRUCTIONS
Labs today     Return yearly     Dr. Karolina Eduardo,     Obstetrics and Gynecology  [facility] [place] [facility] [place]

## 2024-11-18 NOTE — NURSING NOTE
"Chief Complaint   Patient presents with    Consult     Discuss starting HRT--pt declined pelvic exam--low sex drive--getting headaches--mood changes--trouble sleeping       Initial /70   Ht 1.702 m (5' 7\")   Wt 86.9 kg (191 lb 9.6 oz)   LMP 2013   BMI 30.01 kg/m   Estimated body mass index is 30.01 kg/m  as calculated from the following:    Height as of this encounter: 1.702 m (5' 7\").    Weight as of this encounter: 86.9 kg (191 lb 9.6 oz).  BP completed using cuff size: regular    Questioned patient about current smoking habits.  Pt. has never smoked.          The following HM Due: NONE  "

## 2024-11-18 NOTE — PROGRESS NOTES
SUBJECTIVE:  Shaye Brian is an 64 year old  postmenopausal woman   who presents for annual gyn exam. Menopause at age 50s. No   bleeding, spotting, or discharge noted.     Estrogen replacement therapy: never  ANN exposure: no  History of abnormal Pap smear: No  Family history of uterine or ovarian cancer: Yes: PGM-ovarian, and P Aunt  Regular self breast exam: Yes  History of abnormal mammogram: No  Family history of breast cancer: No  History of abnormal lipids: No    Past Medical History:   Diagnosis Date    Cancer (H)     Hx of squamous cell carcinoma     Mumps           Family History   Problem Relation Age of Onset    Alzheimer Disease Mother         macular degeneration    Cancer Mother         brain tumor    Hypertension Mother     Macular Degeneration Mother     Heart Disease Father     Cancer Father         ureter cancer    Respiratory Father         emphysema, smoker    Skin Cancer Sister     Ovarian Cancer Paternal Uncle     Ovarian Cancer Paternal Cousin     Breast Cancer Other         maternal aunt-using hormones    Cancer Other         dad's sister with ovarian cancer, her daughter had ovarian cancer    Cancer - colorectal No family hx of        Past Surgical History:   Procedure Laterality Date    D & C      after miscarriage-first time    DAVINCI HYSTERECTOMY TOTAL, BILATERAL SALPINGO-OOPHORECTOMY, COMBINED  2013    Procedure: COMBINED DAVINCI HYSTERECTOMY TOTAL, SALPINGectomy;  Combined Davinci Total Laparoscopic HYSTERECTOMY, Bilateral Salpingectomy, Cystoscopy;  Surgeon: Karolina Eduardo DO;  Location: [facility] OR - Path Fibroids, Adenomyosis       Current Outpatient Medications   Medication Sig Dispense Refill    cetirizine-pseudoePHEDrine ER (ZYRTEC-D) 5-120 MG 12 hr tablet Take 1 tablet by mouth 2 times daily      fluticasone (FLONASE) 50 MCG/ACT nasal spray Spray 1 spray into both nostrils daily. 16 g 5    ibuprofen (ADVIL/MOTRIN) 200 MG capsule Take 1 capsule (200 mg) by  "mouth every 4 hours as needed for fever      loratadine-pseudoePHEDrine (CLARITIN-D 24-HOUR)  MG 24 hr tablet Take 1 tablet by mouth daily. 30 tablet 5    Probiotic Product (PROBIOTIC DAILY) CAPS       amoxicillin (AMOXIL) 500 MG capsule TAKE 4 CAPSULES BY MOUTH 1 HOUR PRIOR TO DENTAL APPOINTMENT       No current facility-administered medications for this visit.     Allergies   Allergen Reactions    Iron      Iron infusion; throat sensitivity and redness on her chest       Social History     Tobacco Use    Smoking status: Never    Smokeless tobacco: Never   Substance Use Topics    Alcohol use: Yes     Alcohol/week: 0.0 standard drinks of alcohol     Comment: socially -once every one month        Review Of Systems  Ears/Nose/Throat: negative  Respiratory: No shortness of breath, dyspnea on exertion, cough, or hemoptysis  Cardiovascular: negative  Gastrointestinal: negative  Genitourinary: See HPI   Constitutional, HEENT, cardiovascular, pulmonary, GI, , musculoskeletal, neuro, skin, endocrine and psych systems are negative, except as otherwise noted.    OBJECTIVE:  /70   Ht 1.702 m (5' 7\")   Wt 86.9 kg (191 lb 9.6 oz)   LMP 12/05/2013   BMI 30.01 kg/m    General appearance: healthy, alert and no distress  Skin: Skin color, texture, turgor normal. No rashes or lesions.  Ears: negative  Nose/Sinuses: Nares normal. Septum midline. Mucosa normal. No drainage or sinus tenderness.  Oropharynx: Lips, mucosa, and tongue normal. Teeth and gums normal.  Neck: Neck supple. No adenopathy. Thyroid symmetric, normal size,, Carotids without bruits.  Lungs: negative, Percussion normal. Good diaphragmatic excursion. Lungs clear  Heart: negative, PMI normal. No lifts, heaves, or thrills. RRR. No murmurs, clicks gallops or rub  Breasts: Inspection negative. No nipple discharge or bleeding. No masses.  Abdomen: Abdomen soft, non-tender. BS normal. No masses, organomegaly  Pelvic: Pelvic:  Pelvic examination with no " pap/no Gonorrhea and Chlamydia   including  External genitalia normal   and vagina normal rugatted  atrophic  Examination of urethra  normal no masses, tenderness, scarring  bladder, no masses or tenderness  Cervix no lesions or discharge  Bimanual exam with   Cervix/uterus surgically absent   Adnexa/parametria  no masses       ASSESSMENT:  Shaye Brian is an 64 year old  postmenopausal woman   who presents for annual gyn exam.     PLAN:  Dx:  1)  Pap smear not indicated, mammogram 24, Colonoscopy   2)  Lipids at appropriate intervals  3)  Menopause:    Has low sex drive, add vaginal estrogen, will work on supplements,   Then consider estrogen replacement   4) Stress-chronic:  Notes mood changes-depression over the last 2 months, occurring out of the blue.   Her  retired with disability and she is his primary caretaker, she is also working,   He has some memory issues. He has MS. She has insurance through medical sharing program.   She feels guilty about  doing fun things with her family.   Consider support group, welbutrin for stress support  5)  Obesity:  has lost 25 pounds, but gained back.    Add protein 100-125 grams daily  Increase weight training   6)  Headaches:  working with pcp, check hormones,   Started a few months prior   Started physical therapy for shoulder and that is helping.         PE:  Reviewed health maintenance including diet, regular exercise,   estrogen replacement and periodic exams.    Dr. Karolina Eduardo, DO    Obstetrics and Gynecology  [facility] [place] [facility] [place]

## 2024-11-19 ENCOUNTER — PATIENT OUTREACH (OUTPATIENT)
Dept: CARE COORDINATION | Facility: CLINIC | Age: 64
End: 2024-11-19

## 2024-11-19 LAB — DHEA-S SERPL-MCNC: 60 UG/DL (ref 35–430)

## 2024-11-20 LAB
ANDROST SERPL-MCNC: 0.34 NG/ML
TESTOST SERPL-MCNC: 7 NG/DL (ref 8–60)

## 2024-11-21 ENCOUNTER — PATIENT OUTREACH (OUTPATIENT)
Dept: CARE COORDINATION | Facility: CLINIC | Age: 64
End: 2024-11-21

## 2024-11-22 ENCOUNTER — OFFICE VISIT (OUTPATIENT)
Dept: URGENT CARE | Facility: URGENT CARE | Age: 64
End: 2024-11-22

## 2024-11-22 VITALS
BODY MASS INDEX: 29.91 KG/M2 | SYSTOLIC BLOOD PRESSURE: 102 MMHG | WEIGHT: 191 LBS | HEART RATE: 90 BPM | OXYGEN SATURATION: 96 % | RESPIRATION RATE: 16 BRPM | DIASTOLIC BLOOD PRESSURE: 64 MMHG | TEMPERATURE: 97.4 F

## 2024-11-22 DIAGNOSIS — N30.01 ACUTE CYSTITIS WITH HEMATURIA: Primary | ICD-10-CM

## 2024-11-22 DIAGNOSIS — R39.89 URINARY PROBLEM: ICD-10-CM

## 2024-11-22 LAB
ALBUMIN UR-MCNC: 30 MG/DL
APPEARANCE UR: CLEAR
BACTERIA #/AREA URNS HPF: ABNORMAL /HPF
BILIRUB UR QL STRIP: NEGATIVE
COLOR UR AUTO: YELLOW
GLUCOSE UR STRIP-MCNC: NEGATIVE MG/DL
HGB UR QL STRIP: ABNORMAL
KETONES UR STRIP-MCNC: NEGATIVE MG/DL
LEUKOCYTE ESTERASE UR QL STRIP: ABNORMAL
MUCOUS THREADS #/AREA URNS LPF: PRESENT /LPF
NITRATE UR QL: NEGATIVE
PH UR STRIP: 6 [PH] (ref 5–7)
RBC #/AREA URNS AUTO: ABNORMAL /HPF
SP GR UR STRIP: 1.02 (ref 1–1.03)
SQUAMOUS #/AREA URNS AUTO: ABNORMAL /LPF
UROBILINOGEN UR STRIP-ACNC: 0.2 E.U./DL
WBC #/AREA URNS AUTO: ABNORMAL /HPF

## 2024-11-22 PROCEDURE — 81001 URINALYSIS AUTO W/SCOPE: CPT | Performed by: PHYSICIAN ASSISTANT

## 2024-11-22 PROCEDURE — 87086 URINE CULTURE/COLONY COUNT: CPT | Performed by: PHYSICIAN ASSISTANT

## 2024-11-22 RX ORDER — CEFDINIR 300 MG/1
300 CAPSULE ORAL 2 TIMES DAILY
Qty: 10 CAPSULE | Refills: 0 | Status: SHIPPED | OUTPATIENT
Start: 2024-11-22 | End: 2024-11-27

## 2024-11-23 NOTE — PROGRESS NOTES
SUBJECTIVE:  Shaye Brian is a 64 year old female    Past Medical History:   Diagnosis Date    Cancer (H)     Hx of squamous cell carcinoma     Mumps      Patient Active Problem List   Diagnosis    Hyperlipidemia LDL goal <160    History of robot-assisted laparoscopic hysterectomy    Prediabetes    H/O bilateral salpingectomy     Current Outpatient Medications   Medication Sig Dispense Refill    amoxicillin (AMOXIL) 500 MG capsule TAKE 4 CAPSULES BY MOUTH 1 HOUR PRIOR TO DENTAL APPOINTMENT      buPROPion (WELLBUTRIN XL) 150 MG 24 hr tablet Take 1 tablet (150 mg) by mouth every morning. 90 tablet 3    cetirizine-pseudoePHEDrine ER (ZYRTEC-D) 5-120 MG 12 hr tablet Take 1 tablet by mouth 2 times daily      estradiol (ESTRACE) 0.1 MG/GM vaginal cream Place 2 g vaginally twice a week. 70 g 11    fluticasone (FLONASE) 50 MCG/ACT nasal spray Spray 1 spray into both nostrils daily. 16 g 5    ibuprofen (ADVIL/MOTRIN) 200 MG capsule Take 1 capsule (200 mg) by mouth every 4 hours as needed for fever      loratadine-pseudoePHEDrine (CLARITIN-D 24-HOUR)  MG 24 hr tablet Take 1 tablet by mouth daily. 30 tablet 5    Probiotic Product (PROBIOTIC DAILY) CAPS        No current facility-administered medications for this visit.     Social History     Socioeconomic History    Marital status:      Spouse name: Not on file    Number of children: Not on file    Years of education: Not on file    Highest education level: Not on file   Occupational History    Not on file   Tobacco Use    Smoking status: Never    Smokeless tobacco: Never   Vaping Use    Vaping status: Never Used   Substance and Sexual Activity    Alcohol use: Yes     Alcohol/week: 0.0 standard drinks of alcohol     Comment: socially -once every one month     Drug use: No    Sexual activity: Yes     Partners: Male     Birth control/protection: Surgical     Comment: 12/2013 Mar [facility] Salpingectomy   Other Topics Concern    Parent/sibling w/ CABG, MI or  angioplasty before 65F 55M? 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    One dog     Social Drivers of Health     Financial Resource Strain: Low Risk  (11/24/2023)    Financial Resource Strain     Within the past 12 months, have you or your family members you live with been unable to get utilities (heat, electricity) when it was really needed?: No   Food Insecurity: Low Risk  (11/24/2023)    Food Insecurity     Within the past 12 months, did you worry that your food would run out before you got money to buy more?: No     Within the past 12 months, did the food you bought just not last and you didn t have money to get more?: No   Transportation Needs: Low Risk  (11/24/2023)    Transportation Needs     Within the past 12 months, has lack of transportation kept you from medical appointments, getting your medicines, non-medical meetings or appointments, work, or from getting things that you need?: No   Physical Activity: Sufficiently Active (11/24/2023)    Exercise Vital Sign     Days of Exercise per Week: 6 days     Minutes of Exercise per Session: 30 min   Recent Concern: Physical Activity - Insufficiently Active (8/28/2023)    Exercise Vital Sign     Days of Exercise per Week: 3 days     Minutes of Exercise per Session: 30 min   Stress: Stress Concern Present (11/24/2023)    [employer] - Occupational Stress Questionnaire     Feeling of Stress : To some extent   Social Connections: Socially Integrated (11/24/2023)    Social Connection and Isolation Panel [NHANES]     Frequency of Communication with Friends and Family: More than three times a week     Frequency of Social Gatherings with Friends and Family: Twice a week     Attends [employer] Services: More than 4 times per year     Active Member of Clubs or Organizations: Yes     Attends Club or Organization Meetings: More than 4 times per year     Marital Status:     Interpersonal Safety: Low Risk  (10/21/2024)    Interpersonal Safety     Do you feel physically and emotionally safe where you currently live?: Yes     Within the past 12 months, have you been hit, slapped, kicked or otherwise physically hurt by someone?: No     Within the past 12 months, have you been humiliated or emotionally abused in other ways by your partner or ex-partner?: No   Housing Stability: Low Risk  (11/24/2023)    Housing Stability     Do you have housing? : Yes     Are you worried about losing your housing?: No     ROS  negative other than stated above    Exam:  {:[id number]}    ***

## 2024-11-24 LAB — BACTERIA UR CULT: NO GROWTH

## 2024-12-05 ENCOUNTER — OFFICE VISIT (OUTPATIENT)
Dept: FAMILY MEDICINE | Facility: CLINIC | Age: 64
End: 2024-12-05

## 2024-12-05 VITALS
SYSTOLIC BLOOD PRESSURE: 118 MMHG | BODY MASS INDEX: 30.13 KG/M2 | RESPIRATION RATE: 16 BRPM | DIASTOLIC BLOOD PRESSURE: 75 MMHG | HEART RATE: 67 BPM | TEMPERATURE: 97.6 F | HEIGHT: 67 IN | WEIGHT: 192 LBS | OXYGEN SATURATION: 98 %

## 2024-12-05 DIAGNOSIS — M25.562 CHRONIC PAIN OF BOTH KNEES: ICD-10-CM

## 2024-12-05 DIAGNOSIS — E78.5 HYPERLIPIDEMIA LDL GOAL <160: ICD-10-CM

## 2024-12-05 DIAGNOSIS — G89.29 CHRONIC PAIN OF BOTH KNEES: ICD-10-CM

## 2024-12-05 DIAGNOSIS — M25.561 CHRONIC PAIN OF BOTH KNEES: ICD-10-CM

## 2024-12-05 DIAGNOSIS — Z00.00 ROUTINE GENERAL MEDICAL EXAMINATION AT A HEALTH CARE FACILITY: Primary | ICD-10-CM

## 2024-12-05 DIAGNOSIS — R73.03 PREDIABETES: ICD-10-CM

## 2024-12-05 DIAGNOSIS — N89.8 VAGINAL IRRITATION: ICD-10-CM

## 2024-12-05 DIAGNOSIS — L82.0 INFLAMED SEBORRHEIC KERATOSIS: ICD-10-CM

## 2024-12-05 DIAGNOSIS — Z12.31 ENCOUNTER FOR SCREENING MAMMOGRAM FOR BREAST CANCER: ICD-10-CM

## 2024-12-05 LAB
ALBUMIN UR-MCNC: NEGATIVE MG/DL
APPEARANCE UR: CLEAR
BACTERIA #/AREA URNS HPF: ABNORMAL /HPF
BILIRUB UR QL STRIP: NEGATIVE
CLUE CELLS: ABNORMAL
COLOR UR AUTO: YELLOW
ERYTHROCYTE [DISTWIDTH] IN BLOOD BY AUTOMATED COUNT: 12.8 % (ref 10–15)
EST. AVERAGE GLUCOSE BLD GHB EST-MCNC: 126 MG/DL
GLUCOSE UR STRIP-MCNC: NEGATIVE MG/DL
HBA1C MFR BLD: 6 % (ref 0–5.6)
HCT VFR BLD AUTO: 36.8 % (ref 35–47)
HGB BLD-MCNC: 13.1 G/DL (ref 11.7–15.7)
HGB UR QL STRIP: ABNORMAL
KETONES UR STRIP-MCNC: NEGATIVE MG/DL
LEUKOCYTE ESTERASE UR QL STRIP: NEGATIVE
MCH RBC QN AUTO: 30.5 PG (ref 26.5–33)
MCHC RBC AUTO-ENTMCNC: 35.6 G/DL (ref 31.5–36.5)
MCV RBC AUTO: 86 FL (ref 78–100)
NITRATE UR QL: NEGATIVE
PH UR STRIP: 5.5 [PH] (ref 5–7)
PLATELET # BLD AUTO: 315 10E3/UL (ref 150–450)
RBC # BLD AUTO: 4.3 10E6/UL (ref 3.8–5.2)
RBC #/AREA URNS AUTO: ABNORMAL /HPF
SP GR UR STRIP: 1.01 (ref 1–1.03)
SQUAMOUS #/AREA URNS AUTO: ABNORMAL /LPF
TRICHOMONAS, WET PREP: ABNORMAL
UROBILINOGEN UR STRIP-ACNC: 0.2 E.U./DL
WBC # BLD AUTO: 6.7 10E3/UL (ref 4–11)
WBC #/AREA URNS AUTO: ABNORMAL /HPF
WBC'S/HIGH POWER FIELD, WET PREP: ABNORMAL
YEAST, WET PREP: ABNORMAL

## 2024-12-05 RX ORDER — BETAMETHASONE VALERATE 1.2 MG/G
CREAM TOPICAL 2 TIMES DAILY
Qty: 45 G | Refills: 1 | Status: SHIPPED | OUTPATIENT
Start: 2024-12-05

## 2024-12-05 SDOH — HEALTH STABILITY: PHYSICAL HEALTH: ON AVERAGE, HOW MANY MINUTES DO YOU ENGAGE IN EXERCISE AT THIS LEVEL?: 40 MIN

## 2024-12-05 SDOH — HEALTH STABILITY: PHYSICAL HEALTH: ON AVERAGE, HOW MANY DAYS PER WEEK DO YOU ENGAGE IN MODERATE TO STRENUOUS EXERCISE (LIKE A BRISK WALK)?: 6 DAYS

## 2024-12-05 ASSESSMENT — ANXIETY QUESTIONNAIRES
1. FEELING NERVOUS, ANXIOUS, OR ON EDGE: SEVERAL DAYS
IF YOU CHECKED OFF ANY PROBLEMS ON THIS QUESTIONNAIRE, HOW DIFFICULT HAVE THESE PROBLEMS MADE IT FOR YOU TO DO YOUR WORK, TAKE CARE OF THINGS AT HOME, OR GET ALONG WITH OTHER PEOPLE: NOT DIFFICULT AT ALL
7. FEELING AFRAID AS IF SOMETHING AWFUL MIGHT HAPPEN: NOT AT ALL
3. WORRYING TOO MUCH ABOUT DIFFERENT THINGS: SEVERAL DAYS
5. BEING SO RESTLESS THAT IT IS HARD TO SIT STILL: NOT AT ALL
2. NOT BEING ABLE TO STOP OR CONTROL WORRYING: SEVERAL DAYS
8. IF YOU CHECKED OFF ANY PROBLEMS, HOW DIFFICULT HAVE THESE MADE IT FOR YOU TO DO YOUR WORK, TAKE CARE OF THINGS AT HOME, OR GET ALONG WITH OTHER PEOPLE?: NOT DIFFICULT AT ALL
4. TROUBLE RELAXING: NOT AT ALL
GAD7 TOTAL SCORE: 3
6. BECOMING EASILY ANNOYED OR IRRITABLE: NOT AT ALL
GAD7 TOTAL SCORE: 3
GAD7 TOTAL SCORE: 3
7. FEELING AFRAID AS IF SOMETHING AWFUL MIGHT HAPPEN: NOT AT ALL

## 2024-12-05 ASSESSMENT — PATIENT HEALTH QUESTIONNAIRE - PHQ9
SUM OF ALL RESPONSES TO PHQ QUESTIONS 1-9: 1
10. IF YOU CHECKED OFF ANY PROBLEMS, HOW DIFFICULT HAVE THESE PROBLEMS MADE IT FOR YOU TO DO YOUR WORK, TAKE CARE OF THINGS AT HOME, OR GET ALONG WITH OTHER PEOPLE: NOT DIFFICULT AT ALL
SUM OF ALL RESPONSES TO PHQ QUESTIONS 1-9: 1

## 2024-12-05 ASSESSMENT — SOCIAL DETERMINANTS OF HEALTH (SDOH): HOW OFTEN DO YOU GET TOGETHER WITH FRIENDS OR RELATIVES?: ONCE A WEEK

## 2024-12-05 NOTE — PROGRESS NOTES
"Preventive Care Visit  [facility]  Keyla Reed MD, Family Medicine  Dec 5, 2024      Assessment & Plan     Routine general medical examination at a health care facility  - Comprehensive metabolic panel; Future  - CBC with platelets; Future  - Hemoglobin A1c; Future  - Comprehensive metabolic panel  - CBC with platelets  - Hemoglobin A1c    Encounter for screening mammogram for breast cancer  - MA Screen Bilateral w/[place]; Future    Hyperlipidemia LDL goal <160  - Lipid panel reflex to direct LDL Fasting; Future  - Lipid panel reflex to direct LDL Fasting    Chronic pain of both knees - XR last year showed mild OA, continues to have pain mostly with climbing stairs. Also reports her knees make a creaking sound. She is exercising regularly. Feels weak in the knees. Suggested PT to help strengthen leg muscles supporting the knees.    - Physical Therapy  Referral; Future    Vaginal irritation - she will trial the topical estrogen that her OBGYN prescribed. Was unsure she wanted to start this but after further discussion, she is more comfortable. Will also rule out infection as she is concerned about BV.   - UA Macroscopic with reflex to Microscopic and Culture - Lab Collect; Future  - Wet prep - lab collect; Future  - UA Macroscopic with reflex to Microscopic and Culture - Lab Collect  - Wet prep - lab collect  - UA Microscopic with Reflex to Culture    Prediabetes - surveillance labs today    Inflamed seborrheic keratoses - will trial topical steroid - script sent      BMI  Estimated body mass index is 30.53 kg/m  as calculated from the following:    Height as of this encounter: 1.689 m (5' 6.5\").    Weight as of this encounter: 87.1 kg (192 lb).       Counseling  Appropriate preventive services were addressed with this patient via screening, questionnaire, or discussion as appropriate for fall prevention, nutrition, physical activity, Tobacco-use cessation, social engagement, " weight loss and cognition.  Checklist reviewing preventive services available has been given to the patient.  Reviewed patient's diet, addressing concerns and/or questions.   The patient was instructed to see the dentist every 6 months.         Tyron Cr is a 64 year old, presenting for the following:  Physical (PE---pap not needed)        12/5/2024     4:33 PM   Additional Questions   Roomed by Zaria Stephens          HPI  Health Care Directive  Patient has a Health Care Directive on file  Advance care planning document is on file and is current.      12/5/2024   General Health   How would you rate your overall physical health? Excellent   Feel stress (tense, anxious, or unable to sleep) Only a little            12/5/2024   Nutrition   Three or more servings of calcium each day? Yes   Diet: Other   If other, please elaborate: low carb low sugar   How many servings of fruit and vegetables per day? (!) 2-3   How many sweetened beverages each day? 0-1            12/5/2024   Exercise   Days per week of moderate/strenous exercise 6 days   Average minutes spent exercising at this level 40 min            12/5/2024   Social Factors   Frequency of gathering with friends or relatives Once a week            12/5/2024   Dental   Dentist two times every year? (!) NO             Today's PHQ-9 Score:       12/5/2024     4:32 PM   PHQ-9 SCORE   PHQ-9 Total Score MyChart 1 (Minimal depression)   PHQ-9 Total Score 1        Patient-reported         11/24/2023   Substance Use   Frequency of drinking alcohol? Monthly or less   Alcohol more than 3/day or more than 7/wk No        Social History     Tobacco Use    Smoking status: Never     Passive exposure: Never    Smokeless tobacco: Never   Vaping Use    Vaping status: Never Used   Substance Use Topics    Alcohol use: Yes     Alcohol/week: 0.0 standard drinks of alcohol     Comment: socially -once every one month     Drug use: No           11/30/2023   LAST FHS-7 RESULTS   1st  degree relative breast or ovarian cancer No   Any relative bilateral breast cancer No   Any male have breast cancer No   Any ONE woman have BOTH breast AND ovarian cancer No   Any woman with breast cancer before 50yrs No   2 or more relatives with breast AND/OR ovarian cancer --    Yes   2 or more relatives with breast AND/OR bowel cancer No       Multiple values from one day are sorted in reverse-chronological order        Mammogram Screening - Mammogram every 1-2 years updated in Health Maintenance based on mutual decision making      History of abnormal Pap smear: Status post hysterectomy with removal of cervix and no history of CIN2 or greater or cervical cancer. Health Maintenance and Surgical History updated.        5/6/2013    12:00 AM   PAP / HPV   PAP (Historical) NIL      ASCVD Risk   The 10-year ASCVD risk score (Yogesh FUENTES, et al., 2019) is: 4.3%    Values used to calculate the score:      Age: 64 years      Sex: Female      Is Non- : No      Diabetic: No      Tobacco smoker: No      Systolic Blood Pressure: 118 mmHg      Is BP treated: No      HDL Cholesterol: 52 mg/dL      Total Cholesterol: 191 mg/dL         Reviewed and updated as needed this visit by Provider                    Patient Active Problem List   Diagnosis    Hyperlipidemia LDL goal <160    History of robot-assisted laparoscopic hysterectomy    Prediabetes    H/O bilateral salpingectomy     Past Surgical History:   Procedure Laterality Date    D & C  1984    after miscarriage-first time    DAVINCI HYSTERECTOMY TOTAL, BILATERAL SALPINGO-OOPHORECTOMY, COMBINED  12/27/2013    Procedure: COMBINED DAVINCI HYSTERECTOMY TOTAL, SALPINGectomy;  Combined Davinci Total Laparoscopic HYSTERECTOMY, Bilateral Salpingectomy, Cystoscopy;  Surgeon: Karolina Eduardo DO;  Location: [facility] OR - Path Fibroids, Adenomyosis       Social History     Tobacco Use    Smoking status: Never     Passive exposure: Never    Smokeless  "tobacco: Never   Substance Use Topics    Alcohol use: Yes     Alcohol/week: 0.0 standard drinks of alcohol     Comment: socially -once every one month      Family History   Problem Relation Age of Onset    Alzheimer Disease Mother         macular degeneration    Cancer Mother         brain tumor    Hypertension Mother     Macular Degeneration Mother     Heart Disease Father     Cancer Father         ureter cancer    Respiratory Father         emphysema, smoker    Skin Cancer Sister     Ovarian Cancer Paternal Uncle     Ovarian Cancer Paternal Cousin     Breast Cancer Other         maternal aunt-using hormones    Cancer Other         dad's sister with ovarian cancer, her daughter had ovarian cancer    Cancer - colorectal No family hx of              Review of Systems  Constitutional, neuro, ENT, endocrine, pulmonary, cardiac, gastrointestinal, genitourinary, musculoskeletal, integument and psychiatric systems are negative, except as otherwise noted.     Objective    Exam  /75 (BP Location: Right arm, Patient Position: Chair, Cuff Size: Adult Large)   Pulse 67   Temp 97.6  F (36.4  C) (Oral)   Resp 16   Ht 1.689 m (5' 6.5\")   Wt 87.1 kg (192 lb)   LMP 12/05/2013   SpO2 98%   BMI 30.53 kg/m     Estimated body mass index is 30.53 kg/m  as calculated from the following:    Height as of this encounter: 1.689 m (5' 6.5\").    Weight as of this encounter: 87.1 kg (192 lb).    Physical Exam  GENERAL: alert and no distress  EYES: Eyes grossly normal to inspection, PERRL and conjunctivae and sclerae normal  HENT: ear canals and TM's normal, nose and mouth without ulcers or lesions  NECK: no adenopathy, no asymmetry, masses, or scars  RESP: lungs clear to auscultation - no rales, rhonchi or wheezes  BREAST: normal without masses, tenderness or nipple discharge and no palpable axillary masses or adenopathy  CV: regular rate and rhythm, normal S1 S2, no S3 or S4, no murmur, click or rub, no peripheral edema  ABDOMEN: " soft, nontender, no hepatosplenomegaly, no masses and bowel sounds normal  MS: no gross musculoskeletal defects noted, no edema  SKIN: no suspicious lesions or rashes  NEURO: Normal strength and tone, mentation intact and speech normal  PSYCH: mentation appears normal, affect normal/bright        Signed Electronically by: Keyla Reed MD    Answers submitted by the patient for this visit:  Patient Health Questionnaire (Submitted on 12/5/2024)  If you checked off any problems, how difficult have these problems made it for you to do your work, take care of things at home, or get along with other people?: Not difficult at all  PHQ9 TOTAL SCORE: 1  Patient Health Questionnaire (G7) (Submitted on 12/5/2024)  EMORY 7 TOTAL SCORE: 3

## 2024-12-05 NOTE — PATIENT INSTRUCTIONS
Patient Education   Preventive Care Advice   This is general advice given by our system to help you stay healthy. However, your care team may have specific advice just for you. Please talk to your care team about your preventive care needs.  Nutrition  Eat 5 or more servings of fruits and vegetables each day.  Try wheat bread, brown rice and whole grain pasta (instead of white bread, rice, and pasta).  Get enough calcium and vitamin D. Check the label on foods and aim for 100% of the RDA (recommended daily allowance).  Lifestyle  Exercise at least 150 minutes each week  (30 minutes a day, 5 days a week).  Do muscle strengthening activities 2 days a week. These help control your weight and prevent disease.  No smoking.  Wear sunscreen to prevent skin cancer.  Have a dental exam and cleaning every 6 months.  Yearly exams  See your health care team every year to talk about:  Any changes in your health.  Any medicines your care team has prescribed.  Preventive care, family planning, and ways to prevent chronic diseases.  Shots (vaccines)   HPV shots (up to age 26), if you've never had them before.  Hepatitis B shots (up to age 59), if you've never had them before.  COVID-19 shot: Get this shot when it's due.  Flu shot: Get a flu shot every year.  Tetanus shot: Get a tetanus shot every 10 years.  Pneumococcal, hepatitis A, and RSV shots: Ask your care team if you need these based on your risk.  Shingles shot (for age 50 and up)  General health tests  Diabetes screening:  Starting at age 35, Get screened for diabetes at least every 3 years.  If you are younger than age 35, ask your care team if you should be screened for diabetes.  Cholesterol test: At age 39, start having a cholesterol test every 5 years, or more often if advised.  Bone density scan (DEXA): At age 50, ask your care team if you should have this scan for osteoporosis (brittle bones).  Hepatitis C: Get tested at least once in your life.  STIs (sexually  transmitted infections)  Before age 24: Ask your care team if you should be screened for STIs.  After age 24: Get screened for STIs if you're at risk. You are at risk for STIs (including HIV) if:  You are sexually active with more than one person.  You don't use condoms every time.  You or a partner was diagnosed with a sexually transmitted infection.  If you are at risk for HIV, ask about PrEP medicine to prevent HIV.  Get tested for HIV at least once in your life, whether you are at risk for HIV or not.  Cancer screening tests  Cervical cancer screening: If you have a cervix, begin getting regular cervical cancer screening tests starting at age 21.  Breast cancer scan (mammogram): If you've ever had breasts, begin having regular mammograms starting at age 40. This is a scan to check for breast cancer.  Colon cancer screening: It is important to start screening for colon cancer at age 45.  Have a colonoscopy test every 10 years (or more often if you're at risk) Or, ask your provider about stool tests like a FIT test every year or Cologuard test every 3 years.  To learn more about your testing options, visit:   .  For help making a decision, visit:   https://bit.ly/ef65757.  Prostate cancer screening test: If you have a prostate, ask your care team if a prostate cancer screening test (PSA) at age 55 is right for you.  Lung cancer screening: If you are a current or former smoker ages 50 to 80, ask your care team if ongoing lung cancer screenings are right for you.  For informational purposes only. Not to replace the advice of your health care provider. Copyright   2023 [employer]". All rights reserved. Clinically reviewed by the [facility]. [employer] 136227 - REV 01/24.

## 2024-12-09 ENCOUNTER — NURSE TRIAGE (OUTPATIENT)
Dept: FAMILY MEDICINE | Facility: CLINIC | Age: 64
End: 2024-12-09

## 2024-12-09 ENCOUNTER — MYC MEDICAL ADVICE (OUTPATIENT)
Dept: FAMILY MEDICINE | Facility: CLINIC | Age: 64
End: 2024-12-09

## 2024-12-09 NOTE — TELEPHONE ENCOUNTER
Nurse Triage SBAR    Is this a 2nd Level Triage? YES, LICENSED PRACTITIONER REVIEW IS REQUIRED    Situation: patient sent my chart message about shoulder pain, was informed to call for further triage.  Patient sees PT for Right shoulder pain already.   No known injury, feels like a knot per patient.   Background: Patient was seen last week by PCP 12/5/24. She did not mention it at the time, but she had started having left sided shoulder pain at that time.  It has since become progressively worse. Very painful under left scapula, painful to the touch.   Pain is mostly in left shoulder, but goes around to back scapula and up her neck. She feels this is muscular in nature as it does not hurt as much when lying down.  Increase in pain rated 8/10 when trying to lift anything with left arm or when not taking OTC pain medications.  Patient took Vistaril this weekend that helped the pain.  Is not taking 4 tabs of Ibuprofen and 2 tabs of Tylenol that has brought the pain down to 6/10.  Patient states she felt some tingling this AM in arm.  Assessment:   Shoulder pain of unknown origin.   Protocol Recommended Disposition: Office or Urgent Care      Recommendation: will forward to PCP for review.     Routed to provider    Does the patient meet one of the following criteria for ADS visit consideration? 16+ years old, with an [facility] PCP     TIP  Providers, please consider if this condition is appropriate for management at one of our Acute and Diagnostic Services sites.     If patient is a good candidate, please use dotphrase <dot>triageresponse and select Refer to ADS to document.    Reason for Disposition   SEVERE pain (e.g., excruciating, unable to do any normal activities)    Additional Information   Negative: Shock suspected (e.g., cold/pale/clammy skin, too weak to stand, low BP, rapid pulse)   Negative: Similar pain previously and it was from 'heart attack'   Negative: Similar pain previously and it was from 'angina' and  "not relieved by nitroglycerin   Negative: Sounds like a life-threatening emergency to the triager   Negative: Chest pain   Negative: Followed an injury to shoulder   Negative: Difficulty breathing or unusual sweating (e.g., sweating without exertion)   Negative: Pain lasting > 5 minutes and pain also present in chest  (Exception: Pain is clearly made worse by movement.)   Negative: Age > 40 and no obvious cause and pain even when not moving the arm  (Exception: Pain is clearly made worse by moving arm or bending neck.)   Negative: Red area or streak and fever   Negative: Swollen joint and fever   Negative: Entire arm is swollen   Negative: Patient sounds very sick or weak to the triager    Answer Assessment - Initial Assessment Questions  1. ONSET: \"When did the pain start?\"      12/5/24, felt like a knot in back, feels muscular per patient.  2. LOCATION: \"Where is the pain located?\"      Left upper, part of chest, shoulder, neck and scapula  3. PAIN: \"How bad is the pain?\" (Scale 1-10; or mild, moderate, severe)      Very sore, moderate with Ibuprofen and Tylenol rated 6. Lifting causes more pain rates up to 8.  4. WORK OR EXERCISE: \"Has there been any recent work or exercise that involved this part of the body?\"      not  5. CAUSE: \"What do you think is causing the shoulder pain?\"      unknown  6. OTHER SYMPTOMS: \"Do you have any other symptoms?\" (e.g., neck pain, swelling, rash, fever, numbness, weakness)      Neck pain, weakness in arm when trying to lift anything. Fingers tingled this morning  7. PREGNANCY: \"Is there any chance you are pregnant?\" \"When was your last menstrual period?\"      N/a    Protocols used: Shoulder Pain-A-[place]    "

## 2024-12-09 NOTE — TELEPHONE ENCOUNTER
Provider Response to 2nd Level Triage Request    I have {triage review response:[id number]}. My recommendation is:  {Triage visit type:247559}

## 2024-12-09 NOTE — TELEPHONE ENCOUNTER
"Discussed with Shaye Reed recommendations below. She does not want to follow recommendations/advice to be seen. Only wants to see Dr. Reed. Informer her there is not availability with Dr. Reed and she has reviewed and her recommendations are office visit or urgent care.  I offered her appointments in [place] and [place] which had available openings--declined, Discussed then Dr. Reed recommends Urgent Care. She has declined.   I again offered to look for an available appointment for her in another locations since no availability in , I also provided her with Urgent Care information as of time and again informed patient recommendations was to be seen OV or UC. She has said she is not going to do that and says she will just \"wait it out\". Informed recommendation is to be seen. She asked if provider can prescribe muscle relaxants, informed she would need visit to discuss with provider. (Exam indicated.)     I was unable to assist Shaye in making appointment (declined by Shaye) and I feel she is not likely to proceed to Urgent Care as recommended.     Mirian Johnson R.N.    "

## 2024-12-11 ENCOUNTER — OFFICE VISIT (OUTPATIENT)
Dept: OPTOMETRY | Facility: CLINIC | Age: 64
End: 2024-12-11

## 2024-12-11 DIAGNOSIS — H04.129 DRY EYE: ICD-10-CM

## 2024-12-11 DIAGNOSIS — H52.03 HYPEROPIA OF BOTH EYES: Primary | ICD-10-CM

## 2024-12-11 DIAGNOSIS — H52.4 PRESBYOPIA: ICD-10-CM

## 2024-12-11 PROCEDURE — 92004 COMPRE OPH EXAM NEW PT 1/>: CPT | Performed by: OPTOMETRIST

## 2024-12-11 PROCEDURE — 92015 DETERMINE REFRACTIVE STATE: CPT | Performed by: OPTOMETRIST

## 2024-12-11 ASSESSMENT — VISUAL ACUITY
METHOD: SNELLEN - LINEAR
OD_SC: 20/40
OS_SC: 20/100
OS_SC: 20/30
OD_SC: 20/200
OS_SC+: -1

## 2024-12-11 ASSESSMENT — KERATOMETRY
OS_K2POWER_DIOPTERS: 44.62
OS_AXISANGLE2_DEGREES: 166
OD_K2POWER_DIOPTERS: 44.87
OS_K1POWER_DIOPTERS: 44.37
OS_AXISANGLE_DEGREES: 76
OD_K1POWER_DIOPTERS: 44.12
OD_AXISANGLE2_DEGREES: 135
OD_AXISANGLE_DEGREES: 45

## 2024-12-11 ASSESSMENT — REFRACTION_MANIFEST
OD_SPHERE: +1.00
OS_CYLINDER: SPHERE
OS_SPHERE: +0.75
OD_SPHERE: +1.00
OS_CYLINDER: SPHERE
OD_CYLINDER: SPHERE
OD_CYLINDER: SPHERE
OS_ADD: +2.50
METHOD_AUTOREFRACTION: 1
OD_ADD: +2.50
OS_SPHERE: +0.75

## 2024-12-11 ASSESSMENT — CONF VISUAL FIELD
OD_SUPERIOR_NASAL_RESTRICTION: 0
OS_INFERIOR_NASAL_RESTRICTION: 0
OS_NORMAL: 1
OS_SUPERIOR_NASAL_RESTRICTION: 0
OS_SUPERIOR_TEMPORAL_RESTRICTION: 0
OD_SUPERIOR_TEMPORAL_RESTRICTION: 0
OD_INFERIOR_NASAL_RESTRICTION: 0
OD_INFERIOR_TEMPORAL_RESTRICTION: 0
OD_NORMAL: 1
METHOD: COUNTING FINGERS
OS_INFERIOR_TEMPORAL_RESTRICTION: 0

## 2024-12-11 ASSESSMENT — REFRACTION_WEARINGRX
OS_SPHERE: +2.75
OD_SPHERE: OTC READERS

## 2024-12-11 ASSESSMENT — EXTERNAL EXAM - LEFT EYE: OS_EXAM: NORMAL

## 2024-12-11 ASSESSMENT — TONOMETRY
OD_IOP_MMHG: 14
OS_IOP_MMHG: 14
IOP_METHOD: APPLANATION

## 2024-12-11 ASSESSMENT — SLIT LAMP EXAM - LIDS
COMMENTS: NORMAL
COMMENTS: NORMAL

## 2024-12-11 ASSESSMENT — EXTERNAL EXAM - RIGHT EYE: OD_EXAM: NORMAL

## 2024-12-11 ASSESSMENT — CUP TO DISC RATIO
OD_RATIO: 0.2
OS_RATIO: 0.2

## 2024-12-11 NOTE — PROGRESS NOTES
Chief Complaint   Patient presents with    Annual Eye Exam      Pt reports she has been dizzy and was told to get her eyes checked     Last Eye Exam: 1 year ago - [facility]   Dilated Previously: Yes, side effects of dilation explained today    What are you currently using to see?  readers       Distance Vision Acuity: Satisfied with vision    Near Vision Acuity: Satisfied with vision while reading and using computer with readers    Eye Comfort: dry  Do you use eye drops? : Yes: otc artificial tears when needed       Marycarmen Nash - Optometric Assistant       No diplopia   Blur especially at end of the day and with night driving    Medical, surgical and family histories reviewed and updated 12/11/2024.       OBJECTIVE: See Ophthalmology exam    ASSESSMENT:    ICD-10-CM    1. Hyperopia of both eyes  H52.03       2. Presbyopia  H52.4       3. Dry eye  H04.129           PLAN:   Systane artificial tears  min of two times daily up to four times daily   Progressive addition lens     Stefania Sequeira OD

## 2024-12-11 NOTE — LETTER
12/11/2024      Shaye Brian  [facility] [place] [address]      Dear Colleague,    Thank you for referring your patient, Shaye Brian, to the [facility]. Please see a copy of my visit note below.    Chief Complaint   Patient presents with     Annual Eye Exam      Pt reports she has been dizzy and was told to get her eyes checked     Last Eye Exam: 1 year ago - [facility]   Dilated Previously: Yes, side effects of dilation explained today    What are you currently using to see?  readers       Distance Vision Acuity: Satisfied with vision    Near Vision Acuity: Satisfied with vision while reading and using computer with readers    Eye Comfort: dry  Do you use eye drops? : Yes: otc artificial tears when needed       Marycarmen Nash - Optometric Assistant       No diplopia   Blur especially at end of the day and with night driving    Medical, surgical and family histories reviewed and updated 12/11/2024.       OBJECTIVE: See Ophthalmology exam    ASSESSMENT:    ICD-10-CM    1. Hyperopia of both eyes  H52.03       2. Presbyopia  H52.4       3. Dry eye  H04.129           PLAN:   Systane artificial tears  min of two times daily up to four times daily   Progressive addition lens     Stefania Sequeira OD     Again, thank you for allowing me to participate in the care of your patient.        Sincerely,        Stefania Sequeira, OD

## 2024-12-16 ENCOUNTER — HOSPITAL ENCOUNTER (OUTPATIENT)
Dept: MAMMOGRAPHY | Facility: CLINIC | Age: 64
Discharge: HOME | End: 2024-12-16
Attending: FAMILY MEDICINE | Admitting: FAMILY MEDICINE

## 2024-12-16 DIAGNOSIS — Z12.31 VISIT FOR SCREENING MAMMOGRAM: ICD-10-CM

## 2024-12-16 PROCEDURE — 77067 SCR MAMMO BI INCL CAD: CPT

## 2024-12-16 PROCEDURE — 77063 BREAST TOMOSYNTHESIS BI: CPT

## 2024-12-18 ENCOUNTER — NURSE TRIAGE (OUTPATIENT)
Dept: FAMILY MEDICINE | Facility: CLINIC | Age: 64
End: 2024-12-18

## 2024-12-18 NOTE — TELEPHONE ENCOUNTER
Reason for Disposition   Minor cut or scratch    Additional Information   Negative: Major bleeding (e.g., actively dripping or spurting) and can't be stopped   Negative: Amputation   Negative: Shock suspected (e.g., cold/pale/clammy skin, too weak to stand, low BP, rapid pulse)   Negative: Knife wound (or other possibly deep cut) and to chest, abdomen, back, neck, or head   Negative: Self-injury (e.g., cutting, self-harm) and suicidal or out-of-control   Negative: Sounds like a life-threatening emergency to the triager   Negative: Animal bite and broken skin   Negative: Human bite and broken skin   Negative: Puncture wound   Negative: Bleeding and won't stop after 10 minutes of direct pressure (using correct technique)   Negative: Skin is split open or gaping (or length > 1/2 inch or 12 mm on the skin, 1/4 inch or 6 mm on the face)   Negative: Deep cut and can see bone or tendons   Negative: Cut over knuckle (MCP joint)   Negative: Sensation of something in the wound (i.e., retained object in wound)   Negative: Dirt in the wound and not removed with 15 minutes of scrubbing   Negative: SEVERE pain and not improved 2 hours after pain medicine   Negative: Looks infected and large red area (> 2 inches or 5 cm) or streak   Negative: Fever and spreading red area or streak   Negative: Wound causes numbness (i.e., loss of sensation)   Negative: Wound causes weakness (i.e., decreased ability to move hand, finger, toe)   Negative: Suspicious history for the injury   Negative: Looks infected (e.g., spreading redness, pus) and no fever   Negative: No prior tetanus shots (or is not fully vaccinated)   Negative: HIV positive or severe immunodeficiency (severely weak immune system) and DIRTY cut   Negative: Patient wants to be seen   Negative: Last tetanus shot > 5 years ago and DIRTY cut   Negative: Last tetanus shot > 10 years ago and CLEAN cut   Negative: After 14 days and wound isn't healed   Negative: Diabetes mellitus and  minor cut or scratch on foot   Negative: Minor cut or scratch and from self-injury (e.g., cutting, self-harm) and stable (i.e., not suicidal, not out of control)    Protocols used: Cuts and [id number]

## 2024-12-26 ENCOUNTER — THERAPY VISIT (OUTPATIENT)
Dept: PHYSICAL THERAPY | Facility: CLINIC | Age: 64
End: 2024-12-26

## 2024-12-26 DIAGNOSIS — M25.511 ACUTE PAIN OF RIGHT SHOULDER: Primary | ICD-10-CM

## 2025-04-16 PROBLEM — M25.511 ACUTE PAIN OF RIGHT SHOULDER: Status: RESOLVED | Noted: 2024-12-26 | Resolved: 2025-04-16

## 2025-06-16 ENCOUNTER — VIRTUAL VISIT (OUTPATIENT)
Dept: FAMILY MEDICINE | Facility: CLINIC | Age: 65
End: 2025-06-16

## 2025-06-16 DIAGNOSIS — R37 SEXUAL DYSFUNCTION: Primary | ICD-10-CM

## 2025-06-16 PROCEDURE — 98005 SYNCH AUDIO-VIDEO EST LOW 20: CPT | Performed by: FAMILY MEDICINE

## 2025-06-16 RX ORDER — TESTOSTERONE 1.62 MG/G
1 GEL TRANSDERMAL EVERY OTHER DAY
Qty: 75 G | Refills: 1 | Status: SHIPPED | OUTPATIENT
Start: 2025-06-16

## 2025-06-16 ASSESSMENT — PATIENT HEALTH QUESTIONNAIRE - PHQ9
SUM OF ALL RESPONSES TO PHQ QUESTIONS 1-9: 3
SUM OF ALL RESPONSES TO PHQ QUESTIONS 1-9: 3
10. IF YOU CHECKED OFF ANY PROBLEMS, HOW DIFFICULT HAVE THESE PROBLEMS MADE IT FOR YOU TO DO YOUR WORK, TAKE CARE OF THINGS AT HOME, OR GET ALONG WITH OTHER PEOPLE: NOT DIFFICULT AT ALL

## 2025-06-16 ASSESSMENT — ANXIETY QUESTIONNAIRES
8. IF YOU CHECKED OFF ANY PROBLEMS, HOW DIFFICULT HAVE THESE MADE IT FOR YOU TO DO YOUR WORK, TAKE CARE OF THINGS AT HOME, OR GET ALONG WITH OTHER PEOPLE?: NOT DIFFICULT AT ALL
5. BEING SO RESTLESS THAT IT IS HARD TO SIT STILL: NOT AT ALL
GAD7 TOTAL SCORE: 1
7. FEELING AFRAID AS IF SOMETHING AWFUL MIGHT HAPPEN: NOT AT ALL
GAD7 TOTAL SCORE: 1
2. NOT BEING ABLE TO STOP OR CONTROL WORRYING: NOT AT ALL
4. TROUBLE RELAXING: NOT AT ALL
6. BECOMING EASILY ANNOYED OR IRRITABLE: NOT AT ALL
7. FEELING AFRAID AS IF SOMETHING AWFUL MIGHT HAPPEN: NOT AT ALL
1. FEELING NERVOUS, ANXIOUS, OR ON EDGE: NOT AT ALL
3. WORRYING TOO MUCH ABOUT DIFFERENT THINGS: SEVERAL DAYS
IF YOU CHECKED OFF ANY PROBLEMS ON THIS QUESTIONNAIRE, HOW DIFFICULT HAVE THESE PROBLEMS MADE IT FOR YOU TO DO YOUR WORK, TAKE CARE OF THINGS AT HOME, OR GET ALONG WITH OTHER PEOPLE: NOT DIFFICULT AT ALL
GAD7 TOTAL SCORE: 1

## 2025-06-16 NOTE — PROGRESS NOTES
"Shaye is a 64 year old who is being evaluated via a billable video visit.    How would you like to obtain your AVS? MyChart  If the video visit is dropped, the invitation should be resent by: Text to cell phone: [phone]  Will anyone else be joining your video visit? 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  Reviewed risks including virilization such as male pattern hair, deepening of voice, clitorimegaly which is extremely rare if taking oral doses.  She will start 1 pump of AndroGel 1.62% every other day have testosterone levels and cholesterol panel rechecked in 2 months, I will follow-up with her regarding this result.  Advised her to follow-up with her regular primary care provider or myself for ongoing management.    - testosterone (ANDROGEL 1.62 % PUMP) 20.25 MG/ACT gel  Dispense: 75 g; Refill: 1  - Testosterone, total  - Lipid panel reflex to direct LDL Fasting              Subjective   Shaye is a 64 year old, presenting for the following health issues:  Sexual Problem (Concerns with sexually dysfunction)        6/16/2025     8:28 AM   Additional Questions   Roomed by Zaria Stephens       Video Start Time: 9:24 AM    History of Present Illness       Reason for visit:  Sexual disfunction   She is taking medications regularly.      Patient has lost sexual interest in years ago and is considering starting testosterone replacement therapy for low sex drive, her  currently sees me in primary and is still sexually active and she would like to improve their quality of life.                  Objective    Vitals - Patient Reported  Weight (Patient Reported): 90.7 kg (200 lb)  Height (Patient Reported): 174 cm (5' 8.5\")  BMI (Based " on Pt Reported Ht/Wt): 29.97        Physical Exam   GENERAL: alert and no distress  EYES: Eyes grossly normal to inspection.  No discharge or erythema, or obvious scleral/conjunctival abnormalities.  RESP: No audible wheeze, cough, or visible cyanosis.    SKIN: Visible skin clear. No significant rash, abnormal pigmentation or lesions.  NEURO: Cranial nerves grossly intact.  Mentation and speech appropriate for age.  PSYCH: Appropriate affect, tone, and pace of words          Video-Visit Details    Type of service:  Video Visit   Video End Time:9:36 AM  Originating Location (pt. Location): Home    Distant Location (provider location):  On-site  Platform used for Video Visit: Diana  Signed Electronically by: Felipe Sharma MD